# Patient Record
Sex: MALE | Race: WHITE | Employment: OTHER | URBAN - METROPOLITAN AREA
[De-identification: names, ages, dates, MRNs, and addresses within clinical notes are randomized per-mention and may not be internally consistent; named-entity substitution may affect disease eponyms.]

---

## 2019-11-27 ENCOUNTER — HOSPITAL ENCOUNTER (EMERGENCY)
Age: 79
Discharge: HOME | End: 2019-11-27
Attending: EMERGENCY MEDICINE
Payer: MEDICARE

## 2019-11-27 ENCOUNTER — APPOINTMENT (OUTPATIENT)
Dept: GENERAL RADIOLOGY | Age: 79
End: 2019-11-27
Payer: MEDICARE

## 2019-11-27 VITALS
DIASTOLIC BLOOD PRESSURE: 83 MMHG | WEIGHT: 170 LBS | SYSTOLIC BLOOD PRESSURE: 145 MMHG | OXYGEN SATURATION: 98 % | BODY MASS INDEX: 24.34 KG/M2 | HEIGHT: 70 IN | RESPIRATION RATE: 15 BRPM | TEMPERATURE: 97.7 F | HEART RATE: 70 BPM

## 2019-11-27 DIAGNOSIS — T14.8XXA ABRASION: ICD-10-CM

## 2019-11-27 DIAGNOSIS — S60.221A CONTUSION OF RIGHT HAND, INITIAL ENCOUNTER: Primary | ICD-10-CM

## 2019-11-27 PROCEDURE — 90715 TDAP VACCINE 7 YRS/> IM: CPT | Performed by: EMERGENCY MEDICINE

## 2019-11-27 PROCEDURE — 90471 IMMUNIZATION ADMIN: CPT | Performed by: EMERGENCY MEDICINE

## 2019-11-27 PROCEDURE — 73130 X-RAY EXAM OF HAND: CPT

## 2019-11-27 PROCEDURE — 99283 EMERGENCY DEPT VISIT LOW MDM: CPT

## 2019-11-27 PROCEDURE — 6360000002 HC RX W HCPCS: Performed by: EMERGENCY MEDICINE

## 2019-11-27 RX ADMIN — TETANUS TOXOID, REDUCED DIPHTHERIA TOXOID AND ACELLULAR PERTUSSIS VACCINE, ADSORBED 0.5 ML: 5; 2.5; 8; 8; 2.5 SUSPENSION INTRAMUSCULAR at 02:31

## 2019-11-27 ASSESSMENT — ENCOUNTER SYMPTOMS
ABDOMINAL PAIN: 0
BACK PAIN: 0
SHORTNESS OF BREATH: 0

## 2019-11-27 ASSESSMENT — PAIN DESCRIPTION - ORIENTATION: ORIENTATION: RIGHT

## 2019-11-27 ASSESSMENT — PAIN DESCRIPTION - DESCRIPTORS: DESCRIPTORS: ACHING

## 2019-11-27 ASSESSMENT — PAIN SCALES - GENERAL: PAINLEVEL_OUTOF10: 5

## 2019-11-27 ASSESSMENT — PAIN DESCRIPTION - PAIN TYPE: TYPE: ACUTE PAIN

## 2019-11-27 ASSESSMENT — PAIN DESCRIPTION - LOCATION: LOCATION: HAND

## 2019-11-27 ASSESSMENT — PAIN DESCRIPTION - PROGRESSION: CLINICAL_PROGRESSION: NOT CHANGED

## 2019-11-27 ASSESSMENT — PAIN DESCRIPTION - ONSET: ONSET: SUDDEN

## 2019-11-27 ASSESSMENT — PAIN DESCRIPTION - FREQUENCY: FREQUENCY: CONTINUOUS

## 2020-02-21 ENCOUNTER — HOSPITAL ENCOUNTER (OUTPATIENT)
Age: 80
Discharge: HOME | End: 2020-02-21
Payer: MEDICARE

## 2020-02-21 LAB
ANION GAP SERPL CALCULATED.3IONS-SCNC: 11 MMOL/L (ref 4–16)
APTT: 24.6 SECONDS (ref 25.1–37.1)
BASOPHILS ABSOLUTE: 0 K/CU MM
BASOPHILS RELATIVE PERCENT: 0.3 % (ref 0–1)
BUN BLDV-MCNC: 20 MG/DL (ref 6–23)
CALCIUM SERPL-MCNC: 9.1 MG/DL (ref 8.3–10.6)
CHLORIDE BLD-SCNC: 102 MMOL/L (ref 99–110)
CO2: 28 MMOL/L (ref 21–32)
CREAT SERPL-MCNC: 1.3 MG/DL (ref 0.9–1.3)
DIFFERENTIAL TYPE: ABNORMAL
EOSINOPHILS ABSOLUTE: 0.1 K/CU MM
EOSINOPHILS RELATIVE PERCENT: 1.6 % (ref 0–3)
GFR AFRICAN AMERICAN: >60 ML/MIN/1.73M2
GFR NON-AFRICAN AMERICAN: 54 ML/MIN/1.73M2
GLUCOSE BLD-MCNC: 89 MG/DL (ref 70–99)
HCT VFR BLD CALC: 46.9 % (ref 42–52)
HEMOGLOBIN: 14.8 GM/DL (ref 13.5–18)
IMMATURE NEUTROPHIL %: 0.4 % (ref 0–0.43)
INR BLD: 0.84 INDEX
LYMPHOCYTES ABSOLUTE: 1.3 K/CU MM
LYMPHOCYTES RELATIVE PERCENT: 16.9 % (ref 24–44)
MCH RBC QN AUTO: 30.5 PG (ref 27–31)
MCHC RBC AUTO-ENTMCNC: 31.6 % (ref 32–36)
MCV RBC AUTO: 96.5 FL (ref 78–100)
MONOCYTES ABSOLUTE: 0.7 K/CU MM
MONOCYTES RELATIVE PERCENT: 8.4 % (ref 0–4)
NUCLEATED RBC %: 0 %
PDW BLD-RTO: 11.9 % (ref 11.7–14.9)
PLATELET # BLD: 163 K/CU MM (ref 140–440)
PMV BLD AUTO: 10.3 FL (ref 7.5–11.1)
POTASSIUM SERPL-SCNC: 4.7 MMOL/L (ref 3.5–5.1)
PROTHROMBIN TIME: 10.1 SECONDS (ref 11.7–14.5)
RBC # BLD: 4.86 M/CU MM (ref 4.6–6.2)
SEGMENTED NEUTROPHILS ABSOLUTE COUNT: 5.6 K/CU MM
SEGMENTED NEUTROPHILS RELATIVE PERCENT: 72.4 % (ref 36–66)
SODIUM BLD-SCNC: 141 MMOL/L (ref 135–145)
TOTAL IMMATURE NEUTOROPHIL: 0.03 K/CU MM
TOTAL NUCLEATED RBC: 0 K/CU MM
WBC # BLD: 7.7 K/CU MM (ref 4–10.5)

## 2020-02-21 PROCEDURE — 80048 BASIC METABOLIC PNL TOTAL CA: CPT

## 2020-02-21 PROCEDURE — 36415 COLL VENOUS BLD VENIPUNCTURE: CPT

## 2020-02-21 PROCEDURE — 85025 COMPLETE CBC W/AUTO DIFF WBC: CPT

## 2020-02-21 PROCEDURE — 85730 THROMBOPLASTIN TIME PARTIAL: CPT

## 2020-02-21 PROCEDURE — 85610 PROTHROMBIN TIME: CPT

## 2020-02-26 ENCOUNTER — HOSPITAL ENCOUNTER (OUTPATIENT)
Dept: CARDIAC CATH/INVASIVE PROCEDURES | Age: 80
Setting detail: OUTPATIENT SURGERY
Discharge: HOME | End: 2020-02-26
Attending: INTERNAL MEDICINE | Admitting: INTERNAL MEDICINE
Payer: MEDICARE

## 2020-02-26 VITALS
WEIGHT: 169 LBS | RESPIRATION RATE: 18 BRPM | OXYGEN SATURATION: 96 % | HEART RATE: 82 BPM | SYSTOLIC BLOOD PRESSURE: 119 MMHG | TEMPERATURE: 98.1 F | HEIGHT: 70 IN | DIASTOLIC BLOOD PRESSURE: 72 MMHG | BODY MASS INDEX: 24.2 KG/M2

## 2020-02-26 PROCEDURE — C1894 INTRO/SHEATH, NON-LASER: HCPCS

## 2020-02-26 PROCEDURE — C1887 CATHETER, GUIDING: HCPCS

## 2020-02-26 PROCEDURE — 6360000002 HC RX W HCPCS

## 2020-02-26 PROCEDURE — 6370000000 HC RX 637 (ALT 250 FOR IP): Performed by: INTERNAL MEDICINE

## 2020-02-26 PROCEDURE — 6360000004 HC RX CONTRAST MEDICATION

## 2020-02-26 PROCEDURE — 2580000003 HC RX 258

## 2020-02-26 PROCEDURE — 93460 R&L HRT ART/VENTRICLE ANGIO: CPT

## 2020-02-26 PROCEDURE — 2709999900 HC NON-CHARGEABLE SUPPLY

## 2020-02-26 PROCEDURE — C1769 GUIDE WIRE: HCPCS

## 2020-02-26 RX ORDER — SODIUM CHLORIDE 0.9 % (FLUSH) 0.9 %
10 SYRINGE (ML) INJECTION EVERY 12 HOURS SCHEDULED
Status: CANCELLED | OUTPATIENT
Start: 2020-02-26

## 2020-02-26 RX ORDER — M-VIT,TX,IRON,MINS/CALC/FOLIC 27MG-0.4MG
1 TABLET ORAL DAILY
COMMUNITY
End: 2022-02-10 | Stop reason: CLARIF

## 2020-02-26 RX ORDER — MORPHINE SULFATE 2 MG/ML
1 INJECTION, SOLUTION INTRAMUSCULAR; INTRAVENOUS
Status: CANCELLED | OUTPATIENT
Start: 2020-02-26 | End: 2020-02-26

## 2020-02-26 RX ORDER — METOPROLOL SUCCINATE 25 MG/1
25 TABLET, EXTENDED RELEASE ORAL EVERY EVENING
Qty: 30 TABLET | Refills: 3 | Status: ON HOLD | OUTPATIENT
Start: 2020-02-26 | End: 2020-03-29 | Stop reason: HOSPADM

## 2020-02-26 RX ORDER — ACETAMINOPHEN 325 MG/1
650 TABLET ORAL EVERY 4 HOURS PRN
Status: CANCELLED | OUTPATIENT
Start: 2020-02-26

## 2020-02-26 RX ORDER — ATROPINE SULFATE 0.4 MG/ML
0.5 AMPUL (ML) INJECTION
Status: CANCELLED | OUTPATIENT
Start: 2020-02-26 | End: 2020-02-26

## 2020-02-26 RX ORDER — METOPROLOL SUCCINATE 25 MG/1
25 TABLET, EXTENDED RELEASE ORAL EVERY EVENING
Qty: 30 TABLET | Refills: 3 | OUTPATIENT
Start: 2020-02-26

## 2020-02-26 RX ORDER — SODIUM CHLORIDE 0.9 % (FLUSH) 0.9 %
10 SYRINGE (ML) INJECTION PRN
Status: CANCELLED | OUTPATIENT
Start: 2020-02-26

## 2020-02-26 RX ORDER — DIPHENHYDRAMINE HCL 25 MG
25 TABLET ORAL ONCE
Status: COMPLETED | OUTPATIENT
Start: 2020-02-26 | End: 2020-02-26

## 2020-02-26 RX ORDER — ASPIRIN 81 MG/1
81 TABLET ORAL DAILY
Qty: 90 TABLET | Refills: 1 | Status: SHIPPED | OUTPATIENT
Start: 2020-02-26 | End: 2020-12-30

## 2020-02-26 RX ORDER — SODIUM CHLORIDE 9 MG/ML
INJECTION, SOLUTION INTRAVENOUS CONTINUOUS
Status: CANCELLED | OUTPATIENT
Start: 2020-02-26

## 2020-02-26 RX ORDER — DIAZEPAM 5 MG/1
5 TABLET ORAL ONCE
Status: COMPLETED | OUTPATIENT
Start: 2020-02-26 | End: 2020-02-26

## 2020-02-26 RX ADMIN — DIAZEPAM 5 MG: 5 TABLET ORAL at 08:04

## 2020-02-26 RX ADMIN — DIPHENHYDRAMINE HYDROCHLORIDE 25 MG: 25 TABLET ORAL at 08:05

## 2020-02-26 NOTE — PROGRESS NOTES
Discharge instructions reviewed. Questions answered. Belongings gathered and checked with admission list. PIV removed. Site WNL. Patient discharged to home with friend.

## 2020-02-26 NOTE — H&P
clubbing noted. NEUROLOGIC:  Cranial nerves II through XII are grossly intact. Echo shows LV function was preserved. Mild aortic stenosis noted. His  stress test shows inferior apical ischemia present.  ______. LABORATORY DATA:  BUN is 20, creatinine 1.3, CBC normal.    IMPRESSION:  This is a 62-year-old male patient with a history of  coronary artery disease status post angioplasty done in 2007. Recent  stress test shows ischemia present. He has been having symptoms of  chest pain and shortness of breath present. In light of all this, we  will plan for heart cath today. We will plan for right and left heart  catheterization. We will make further recommendations based on that.         Laquita Caicedo MD    D: 02/26/2020 8:04:42       T: 02/26/2020 8:48:08     [id number]  Job#: [id number]     Doc#: [id number]    CC:

## 2020-02-26 NOTE — PROCEDURES
angiogram revealed the right coronary artery is a medium-sized  vessel, dominant vessel; and right coronary artery has mild disease  noted. PD and PL branches have mild disease noted. IMPRESSION:  1. Left main is patent. 2.  Circ has mild disease noted in OM1 and OM2 mild disease noted. 3.  LAD has a proximal stent and mid stent present, which is widely  patent. 4.  RCA has mild disease noted, is a dominant vessel. 5.  EDP was normal.  6.  Right heart pressure was normal.    The patient has patent stents in the LAD present; otherwise, mild  disease in the circ and RCA present. Normal right heart pressure  present. At this time, we will continue medical treatment. We will switch his  verapamil to 240 mg in the morning and Toprol 25 mg at bedtime. As he  was off the Toprol, he was having tachycardia. At this time, so I will leave him on the verapamil 240 mg in the morning  and Toprol 25 mg at bedtime and Lipitor and aspirin; and we will have  him follow up in the office and we will consider doing a Holter monitor  and evaluate the AFib and if he is having persistent AFib, then we will  need to reevaluate that.       Blood loss Walter Cifuentes MD    D: 02/26/2020 10:16:32       T: 02/26/2020 10:59:05     [id number]  Job#: [id number]     Doc#: [id number]    CC:

## 2020-03-27 ENCOUNTER — APPOINTMENT (OUTPATIENT)
Dept: CT IMAGING | Age: 80
DRG: 310 | End: 2020-03-27
Payer: MEDICARE

## 2020-03-27 ENCOUNTER — HOSPITAL ENCOUNTER (INPATIENT)
Age: 80
LOS: 2 days | Discharge: HOME | DRG: 310 | End: 2020-03-29
Attending: EMERGENCY MEDICINE | Admitting: INTERNAL MEDICINE
Payer: MEDICARE

## 2020-03-27 ENCOUNTER — APPOINTMENT (OUTPATIENT)
Dept: GENERAL RADIOLOGY | Age: 80
DRG: 310 | End: 2020-03-27
Payer: MEDICARE

## 2020-03-27 ENCOUNTER — APPOINTMENT (OUTPATIENT)
Dept: ULTRASOUND IMAGING | Age: 80
DRG: 310 | End: 2020-03-27
Payer: MEDICARE

## 2020-03-27 PROBLEM — I48.91 A-FIB (HCC): Status: ACTIVE | Noted: 2020-03-27

## 2020-03-27 LAB
ALBUMIN SERPL-MCNC: 3.5 GM/DL (ref 3.4–5)
ALP BLD-CCNC: 76 IU/L (ref 40–129)
ALT SERPL-CCNC: 18 U/L (ref 10–40)
ANION GAP SERPL CALCULATED.3IONS-SCNC: 10 MMOL/L (ref 4–16)
APTT: 24.9 SECONDS (ref 25.1–37.1)
AST SERPL-CCNC: 21 IU/L (ref 15–37)
BASOPHILS ABSOLUTE: 0 K/CU MM
BASOPHILS RELATIVE PERCENT: 0.7 % (ref 0–1)
BILIRUB SERPL-MCNC: 0.7 MG/DL (ref 0–1)
BUN BLDV-MCNC: 21 MG/DL (ref 6–23)
CALCIUM SERPL-MCNC: 8.7 MG/DL (ref 8.3–10.6)
CHLORIDE BLD-SCNC: 106 MMOL/L (ref 99–110)
CO2: 22 MMOL/L (ref 21–32)
CREAT SERPL-MCNC: 1.2 MG/DL (ref 0.9–1.3)
D DIMER: 696 NG/ML(DDU)
DIFFERENTIAL TYPE: ABNORMAL
EKG ATRIAL RATE: 153 BPM
EKG ATRIAL RATE: 74 BPM
EKG DIAGNOSIS: NORMAL
EKG DIAGNOSIS: NORMAL
EKG P AXIS: 3 DEGREES
EKG P-R INTERVAL: 152 MS
EKG Q-T INTERVAL: 310 MS
EKG Q-T INTERVAL: 396 MS
EKG QRS DURATION: 86 MS
EKG QRS DURATION: 86 MS
EKG QTC CALCULATION (BAZETT): 439 MS
EKG QTC CALCULATION (BAZETT): 459 MS
EKG R AXIS: -24 DEGREES
EKG R AXIS: -25 DEGREES
EKG T AXIS: -38 DEGREES
EKG T AXIS: 154 DEGREES
EKG VENTRICULAR RATE: 132 BPM
EKG VENTRICULAR RATE: 74 BPM
EOSINOPHILS ABSOLUTE: 0.2 K/CU MM
EOSINOPHILS RELATIVE PERCENT: 2.9 % (ref 0–3)
GFR AFRICAN AMERICAN: >60 ML/MIN/1.73M2
GFR NON-AFRICAN AMERICAN: 59 ML/MIN/1.73M2
GLUCOSE BLD-MCNC: 102 MG/DL (ref 70–99)
HCT VFR BLD CALC: 43.5 % (ref 42–52)
HEMOGLOBIN: 14.3 GM/DL (ref 13.5–18)
IMMATURE NEUTROPHIL %: 0.2 % (ref 0–0.43)
INR BLD: 0.91 INDEX
LV EF: 53 %
LVEF MODALITY: NORMAL
LYMPHOCYTES ABSOLUTE: 1.8 K/CU MM
LYMPHOCYTES RELATIVE PERCENT: 31 % (ref 24–44)
MAGNESIUM: 1.9 MG/DL (ref 1.8–2.4)
MCH RBC QN AUTO: 31 PG (ref 27–31)
MCHC RBC AUTO-ENTMCNC: 32.9 % (ref 32–36)
MCV RBC AUTO: 94.4 FL (ref 78–100)
MONOCYTES ABSOLUTE: 0.7 K/CU MM
MONOCYTES RELATIVE PERCENT: 11.2 % (ref 0–4)
NUCLEATED RBC %: 0 %
PDW BLD-RTO: 11.9 % (ref 11.7–14.9)
PLATELET # BLD: 121 K/CU MM (ref 140–440)
PMV BLD AUTO: 10.3 FL (ref 7.5–11.1)
POTASSIUM SERPL-SCNC: 4 MMOL/L (ref 3.5–5.1)
PRO-BNP: 254.7 PG/ML
PROTHROMBIN TIME: 11 SECONDS (ref 11.7–14.5)
RBC # BLD: 4.61 M/CU MM (ref 4.6–6.2)
SEGMENTED NEUTROPHILS ABSOLUTE COUNT: 3.1 K/CU MM
SEGMENTED NEUTROPHILS RELATIVE PERCENT: 54 % (ref 36–66)
SODIUM BLD-SCNC: 138 MMOL/L (ref 135–145)
T4 FREE: 1.28 NG/DL (ref 0.9–1.8)
TOTAL IMMATURE NEUTOROPHIL: 0.01 K/CU MM
TOTAL NUCLEATED RBC: 0 K/CU MM
TOTAL PROTEIN: 6 GM/DL (ref 6.4–8.2)
TROPONIN T: <0.01 NG/ML
TROPONIN T: <0.01 NG/ML
TSH HIGH SENSITIVITY: 1.61 UIU/ML (ref 0.27–4.2)
WBC # BLD: 5.8 K/CU MM (ref 4–10.5)

## 2020-03-27 PROCEDURE — 83880 ASSAY OF NATRIURETIC PEPTIDE: CPT

## 2020-03-27 PROCEDURE — 96361 HYDRATE IV INFUSION ADD-ON: CPT

## 2020-03-27 PROCEDURE — 92960 CARDIOVERSION ELECTRIC EXT: CPT

## 2020-03-27 PROCEDURE — 85610 PROTHROMBIN TIME: CPT

## 2020-03-27 PROCEDURE — 7100000001 HC PACU RECOVERY - ADDTL 15 MIN

## 2020-03-27 PROCEDURE — 6360000004 HC RX CONTRAST MEDICATION: Performed by: EMERGENCY MEDICINE

## 2020-03-27 PROCEDURE — 85025 COMPLETE CBC W/AUTO DIFF WBC: CPT

## 2020-03-27 PROCEDURE — B246ZZ4 ULTRASONOGRAPHY OF RIGHT AND LEFT HEART, TRANSESOPHAGEAL: ICD-10-PCS | Performed by: INTERNAL MEDICINE

## 2020-03-27 PROCEDURE — 99285 EMERGENCY DEPT VISIT HI MDM: CPT

## 2020-03-27 PROCEDURE — 85730 THROMBOPLASTIN TIME PARTIAL: CPT

## 2020-03-27 PROCEDURE — 6370000000 HC RX 637 (ALT 250 FOR IP): Performed by: INTERNAL MEDICINE

## 2020-03-27 PROCEDURE — 7100000000 HC PACU RECOVERY - FIRST 15 MIN

## 2020-03-27 PROCEDURE — 80053 COMPREHEN METABOLIC PANEL: CPT

## 2020-03-27 PROCEDURE — 6360000002 HC RX W HCPCS: Performed by: INTERNAL MEDICINE

## 2020-03-27 PROCEDURE — 93005 ELECTROCARDIOGRAM TRACING: CPT | Performed by: INTERNAL MEDICINE

## 2020-03-27 PROCEDURE — 2580000003 HC RX 258: Performed by: INTERNAL MEDICINE

## 2020-03-27 PROCEDURE — 93312 ECHO TRANSESOPHAGEAL: CPT

## 2020-03-27 PROCEDURE — 84439 ASSAY OF FREE THYROXINE: CPT

## 2020-03-27 PROCEDURE — 84443 ASSAY THYROID STIM HORMONE: CPT

## 2020-03-27 PROCEDURE — 93010 ELECTROCARDIOGRAM REPORT: CPT | Performed by: INTERNAL MEDICINE

## 2020-03-27 PROCEDURE — 70450 CT HEAD/BRAIN W/O DYE: CPT

## 2020-03-27 PROCEDURE — 71045 X-RAY EXAM CHEST 1 VIEW: CPT

## 2020-03-27 PROCEDURE — 94761 N-INVAS EAR/PLS OXIMETRY MLT: CPT

## 2020-03-27 PROCEDURE — 84484 ASSAY OF TROPONIN QUANT: CPT

## 2020-03-27 PROCEDURE — 5A2204Z RESTORATION OF CARDIAC RHYTHM, SINGLE: ICD-10-PCS | Performed by: INTERNAL MEDICINE

## 2020-03-27 PROCEDURE — 2700000000 HC OXYGEN THERAPY PER DAY

## 2020-03-27 PROCEDURE — 96374 THER/PROPH/DIAG INJ IV PUSH: CPT

## 2020-03-27 PROCEDURE — 2500000003 HC RX 250 WO HCPCS: Performed by: PHYSICIAN ASSISTANT

## 2020-03-27 PROCEDURE — 93005 ELECTROCARDIOGRAM TRACING: CPT | Performed by: PHYSICIAN ASSISTANT

## 2020-03-27 PROCEDURE — 2140000000 HC CCU INTERMEDIATE R&B

## 2020-03-27 PROCEDURE — 93306 TTE W/DOPPLER COMPLETE: CPT

## 2020-03-27 PROCEDURE — 71275 CT ANGIOGRAPHY CHEST: CPT

## 2020-03-27 PROCEDURE — 2580000003 HC RX 258: Performed by: PHYSICIAN ASSISTANT

## 2020-03-27 PROCEDURE — 85379 FIBRIN DEGRADATION QUANT: CPT

## 2020-03-27 PROCEDURE — 83735 ASSAY OF MAGNESIUM: CPT

## 2020-03-27 RX ORDER — ACETAMINOPHEN 325 MG/1
650 TABLET ORAL EVERY 6 HOURS PRN
Status: DISCONTINUED | OUTPATIENT
Start: 2020-03-27 | End: 2020-03-29 | Stop reason: HOSPADM

## 2020-03-27 RX ORDER — SODIUM CHLORIDE 0.9 % (FLUSH) 0.9 %
10 SYRINGE (ML) INJECTION PRN
Status: DISCONTINUED | OUTPATIENT
Start: 2020-03-27 | End: 2020-03-29 | Stop reason: HOSPADM

## 2020-03-27 RX ORDER — ATORVASTATIN CALCIUM 40 MG/1
40 TABLET, FILM COATED ORAL DAILY
Status: DISCONTINUED | OUTPATIENT
Start: 2020-03-27 | End: 2020-03-29 | Stop reason: HOSPADM

## 2020-03-27 RX ORDER — DILTIAZEM HYDROCHLORIDE 5 MG/ML
10 INJECTION INTRAVENOUS ONCE
Status: COMPLETED | OUTPATIENT
Start: 2020-03-27 | End: 2020-03-27

## 2020-03-27 RX ORDER — PROMETHAZINE HYDROCHLORIDE 25 MG/1
12.5 TABLET ORAL EVERY 6 HOURS PRN
Status: DISCONTINUED | OUTPATIENT
Start: 2020-03-27 | End: 2020-03-29 | Stop reason: HOSPADM

## 2020-03-27 RX ORDER — METOPROLOL SUCCINATE 25 MG/1
25 TABLET, EXTENDED RELEASE ORAL EVERY EVENING
Status: DISCONTINUED | OUTPATIENT
Start: 2020-03-27 | End: 2020-03-27

## 2020-03-27 RX ORDER — 0.9 % SODIUM CHLORIDE 0.9 %
1000 INTRAVENOUS SOLUTION INTRAVENOUS ONCE
Status: COMPLETED | OUTPATIENT
Start: 2020-03-27 | End: 2020-03-27

## 2020-03-27 RX ORDER — ACETAMINOPHEN 650 MG/1
650 SUPPOSITORY RECTAL EVERY 6 HOURS PRN
Status: DISCONTINUED | OUTPATIENT
Start: 2020-03-27 | End: 2020-03-29 | Stop reason: HOSPADM

## 2020-03-27 RX ORDER — ACETAMINOPHEN 650 MG/1
650 SUPPOSITORY RECTAL EVERY 6 HOURS PRN
Status: DISCONTINUED | OUTPATIENT
Start: 2020-03-27 | End: 2020-03-27 | Stop reason: SDUPTHER

## 2020-03-27 RX ORDER — METOPROLOL SUCCINATE 50 MG/1
50 TABLET, EXTENDED RELEASE ORAL 2 TIMES DAILY
Status: DISCONTINUED | OUTPATIENT
Start: 2020-03-27 | End: 2020-03-29 | Stop reason: HOSPADM

## 2020-03-27 RX ORDER — SODIUM CHLORIDE 0.9 % (FLUSH) 0.9 %
10 SYRINGE (ML) INJECTION EVERY 12 HOURS SCHEDULED
Status: DISCONTINUED | OUTPATIENT
Start: 2020-03-27 | End: 2020-03-29 | Stop reason: HOSPADM

## 2020-03-27 RX ORDER — ONDANSETRON 2 MG/ML
4 INJECTION INTRAMUSCULAR; INTRAVENOUS EVERY 6 HOURS PRN
Status: DISCONTINUED | OUTPATIENT
Start: 2020-03-27 | End: 2020-03-29 | Stop reason: HOSPADM

## 2020-03-27 RX ORDER — SODIUM CHLORIDE 0.9 % (FLUSH) 0.9 %
10 SYRINGE (ML) INJECTION EVERY 12 HOURS SCHEDULED
Status: DISCONTINUED | OUTPATIENT
Start: 2020-03-27 | End: 2020-03-27 | Stop reason: SDUPTHER

## 2020-03-27 RX ORDER — POLYETHYLENE GLYCOL 3350 17 G/17G
17 POWDER, FOR SOLUTION ORAL DAILY PRN
Status: DISCONTINUED | OUTPATIENT
Start: 2020-03-27 | End: 2020-03-29 | Stop reason: HOSPADM

## 2020-03-27 RX ORDER — M-VIT,TX,IRON,MINS/CALC/FOLIC 27MG-0.4MG
1 TABLET ORAL DAILY
Status: DISCONTINUED | OUTPATIENT
Start: 2020-03-27 | End: 2020-03-29 | Stop reason: HOSPADM

## 2020-03-27 RX ORDER — ASPIRIN 81 MG/1
81 TABLET ORAL DAILY
Status: DISCONTINUED | OUTPATIENT
Start: 2020-03-27 | End: 2020-03-29 | Stop reason: HOSPADM

## 2020-03-27 RX ORDER — SODIUM CHLORIDE 0.9 % (FLUSH) 0.9 %
10 SYRINGE (ML) INJECTION PRN
Status: DISCONTINUED | OUTPATIENT
Start: 2020-03-27 | End: 2020-03-27 | Stop reason: SDUPTHER

## 2020-03-27 RX ORDER — SODIUM CHLORIDE 9 MG/ML
INJECTION, SOLUTION INTRAVENOUS CONTINUOUS
Status: DISCONTINUED | OUTPATIENT
Start: 2020-03-27 | End: 2020-03-28

## 2020-03-27 RX ORDER — ACETAMINOPHEN 325 MG/1
650 TABLET ORAL EVERY 6 HOURS PRN
Status: DISCONTINUED | OUTPATIENT
Start: 2020-03-27 | End: 2020-03-27 | Stop reason: SDUPTHER

## 2020-03-27 RX ADMIN — SODIUM CHLORIDE: 9 INJECTION, SOLUTION INTRAVENOUS at 08:00

## 2020-03-27 RX ADMIN — ASPIRIN 81 MG: 81 TABLET, COATED ORAL at 12:31

## 2020-03-27 RX ADMIN — MULTIPLE VITAMINS W/ MINERALS TAB 1 TABLET: TAB at 12:33

## 2020-03-27 RX ADMIN — METOPROLOL SUCCINATE 50 MG: 50 TABLET, EXTENDED RELEASE ORAL at 20:21

## 2020-03-27 RX ADMIN — APIXABAN 5 MG: 5 TABLET, FILM COATED ORAL at 12:31

## 2020-03-27 RX ADMIN — AMIODARONE HYDROCHLORIDE 0.5 MG/MIN: 50 INJECTION, SOLUTION INTRAVENOUS at 19:18

## 2020-03-27 RX ADMIN — SODIUM CHLORIDE: 9 INJECTION, SOLUTION INTRAVENOUS at 17:55

## 2020-03-27 RX ADMIN — ATORVASTATIN CALCIUM 40 MG: 40 TABLET, FILM COATED ORAL at 12:31

## 2020-03-27 RX ADMIN — AMIODARONE HYDROCHLORIDE 1 MG/MIN: 50 INJECTION, SOLUTION INTRAVENOUS at 12:38

## 2020-03-27 RX ADMIN — SODIUM CHLORIDE 1000 ML: 9 INJECTION, SOLUTION INTRAVENOUS at 06:13

## 2020-03-27 RX ADMIN — DEXTROSE MONOHYDRATE 5 MG/HR: 50 INJECTION, SOLUTION INTRAVENOUS at 07:17

## 2020-03-27 RX ADMIN — ENOXAPARIN SODIUM 80 MG: 100 INJECTION SUBCUTANEOUS at 10:57

## 2020-03-27 RX ADMIN — SODIUM CHLORIDE, PRESERVATIVE FREE 10 ML: 5 INJECTION INTRAVENOUS at 20:21

## 2020-03-27 RX ADMIN — IOPAMIDOL 80 ML: 755 INJECTION, SOLUTION INTRAVENOUS at 07:58

## 2020-03-27 RX ADMIN — APIXABAN 5 MG: 5 TABLET, FILM COATED ORAL at 20:21

## 2020-03-27 RX ADMIN — AMIODARONE HYDROCHLORIDE 0.5 MG/MIN: 50 INJECTION, SOLUTION INTRAVENOUS at 23:11

## 2020-03-27 RX ADMIN — DEXTROSE MONOHYDRATE 150 MG: 50 INJECTION, SOLUTION INTRAVENOUS at 12:37

## 2020-03-27 RX ADMIN — DILTIAZEM HYDROCHLORIDE 10 MG: 5 INJECTION, SOLUTION INTRAVENOUS at 06:13

## 2020-03-27 RX ADMIN — METOPROLOL SUCCINATE 50 MG: 50 TABLET, EXTENDED RELEASE ORAL at 12:31

## 2020-03-27 ASSESSMENT — PAIN SCALES - GENERAL
PAINLEVEL_OUTOF10: 0

## 2020-03-27 NOTE — PROCEDURES
was  successfully cardioverted from atrial fibrillation to sinus rhythm with  one shock of 200 joules. IMPRESSION:  1. Successful cardioversion. 2.  Keep him on amiodarone and change him to Toprol 50 mg b.i.d. to rate  control and start anticoagulation and we will make further  recommendations based on the hospital course.         Seb Joyce MD    D: 03/27/2020 11:13:54       T: 03/27/2020 12:16:05     [id number]  Job#: [id number]     Doc#: [id number]    CC:

## 2020-03-27 NOTE — H&P
History and Physical      Name:  Darek Purvis /Age/Sex: 1940  (70 y.o. male)   MRN & CSN:  [id number] & [id number] Admission Date/Time: 3/27/2020  5:46 AM   Location:  [facility] PCP: Kylee Garcia MD       Hospital Day: 1    Assessment and Plan:   Darek Purvis is a 70 y.o.  male  who presents with:    Paroxysmal atrial fibrillation with rapid ventricular response  -Per cardiology the patient has a probable history of prior afib  -Anticoagulation: He was on baby aspirin 81 mg at home for coronary artery disease, cardiology has started Eliquis  -Rate control: He is on Toprol-XL 25 mg daily as well as verapamil 240 mg daily at home - cardiology to manage these  -possible MICHAEL with cardioversion today    Coronary artery disease status post stent in   -Resume aspirin 81 mg daily    HTN - on metoprolol and verapamil at home - verapamil was held on admission since Cardizem drip was running    HLD - Lipitor as at home, resume    Never smoker    Remote history of basal cell carcinoma left cheek that is post excision    Mild thrombocytopenia - platelet count 638 - monitor    PCP: Dr. Yumi Akers    History of Present Illness:     Chief Complaint:   Darek Purvis is a 70 y.o.  male  who presents with syncope    Debora Carbajal is a pleasant 42-year-old who stated that he got up to go to the bathroom at 0430 this morning. When he went to the bathroom he got dizzy. He then urinated. He made it back to his bedroom and he said \"I just passed out. I fell flat. I skinned both of my arms\". In the ED he was found to have atrial fibrillation with RVR and was given Cardizem 10 mg IV at 6:15 AM.  This resulted in some improvement in his rapid heart rate, however by 7:15 AM he needed to be started on a Cardizem drip. He normally takes metoprolol and verapamil at home. He lives with his wife. Pain level was 0 out of 10 when I saw him.   Surrogate decision maker: His wife Noman East he states  CODE STATUS: He states he wants to be a full code    Living will: He states he has one, and states he has not looked at it for a while. I asked him if he develops something serious and untreatable if he would want to be on life support, he responded \"no\". Social history: He states he does not smoke. He states he drinks very little alcohol. Results of recent cardiac cath done February 26, 2020 by Dr. Miryam Perez:    1. Left main is patent. 2.  Circ has mild disease noted in OM1 and OM2 mild disease noted. 3.  LAD has a proximal stent and mid stent present, which is widely  patent. 4.  RCA has mild disease noted, is a dominant vessel. 5.  EDP was normal.  6.  Right heart pressure was normal.     The patient has patent stents in the LAD present; otherwise, mild  disease in the circ and RCA present. Normal right heart pressure  present. At this time, we will continue medical treatment. We will switch his  verapamil to 240 mg in the morning and Toprol 25 mg at bedtime. As he  was off the Toprol, he was having tachycardia. At this time, so I will leave him on the verapamil 240 mg in the morning  and Toprol 25 mg at bedtime and Lipitor and aspirin; and we will have  him follow up in the office and we will consider doing a Holter monitor  and evaluate the AFib and if he is having persistent AFib, then we will  need to reevaluate that. Blood loss Stacey Quiñonez MD     D: 02/26/2020 10:16:32     10-14 point ROS reviewed negative, unless as noted above: No vomiting or bleeding noted    Objective:   No intake or output data in the 24 hours ending 03/27/20 0716   Vitals:   Vitals:    03/27/20 0642   BP: 116/79   Pulse: 104   Resp: 16   Temp:    SpO2: 99%     Physical Exam:    GEN Awake male, sitting upright in bed. . Body mass index is 23.82 kg/m². No dyspnea at rest.  He is well-developed and well-nourished  EYES  No scleral erythema, discharge, or conjunctivitis.   Extraocular muscles are intact  HENT Mucous membranes are moist.   NECK No apparent thyromegaly or masses. RESP Clear to auscultation, no wheezes, rales or rhonchi. Symmetric chest movement while on room air. CARDIO/VASC S1/S2 auscultated. Regular rate without appreciable murmurs, rubs, or gallops. GI Abdomen is soft without significant tenderness   Dexter catheter is not present. MSK No gross joint deformities. Spontaneous movement of all extremities  SKIN Normal coloration, warm, dry. NEURO Cranial nerves appear grossly intact, normal speech, no lateralizing weakness. PSYCH Awake, alert, oriented x 4. Affect appropriate. Past Medical History: PMHx:   Past Medical History:   Diagnosis Date    Arthritis     CAD (coronary artery disease)     Hyperlipidemia     Hypertension      PSHx:  has a past surgical history that includes Percutaneous Transluminal Coronary Angio; hernia repair; cyst removal; and Colonoscopy (2/4/13). Allergies: No Known Allergies  Home Medications:   Prior to Admission medications    Medication Sig Start Date End Date Taking?  Authorizing Provider   Multiple Vitamins-Minerals (THERAPEUTIC MULTIVITAMIN-MINERALS) tablet Take 1 tablet by mouth daily    Historical Provider, MD   metoprolol succinate (TOPROL XL) 25 MG extended release tablet Take 1 tablet by mouth every evening 2/26/20   Wilbert Chow MD   aspirin EC 81 MG EC tablet Take 1 tablet by mouth daily 2/26/20   Wilbert Chow MD   verapamil (CALAN) 120 MG tablet Take 240 mg by mouth daily     Historical Provider, MD   Atorvastatin Calcium (LIPITOR PO) Take 40 mg by mouth daily     Historical Provider, MD     FHx: there is heart disease on his mother's side  SHx:   Social History     Socioeconomic History    Marital status:      Spouse name: None    Number of children: None    Years of education: None    Highest education level: None   Occupational History    None   Social Needs    Financial resource strain: None    Food insecurity     Worry: None     Inability: None  Transportation needs     Medical: None     Non-medical: None   Tobacco Use    Smoking status: Never Smoker    Smokeless tobacco: Never Used   Substance and Sexual Activity    Alcohol use: Yes     Comment: occas    Drug use: No    Sexual activity: None   Lifestyle    Physical activity     Days per week: None     Minutes per session: None    Stress: None   Relationships    Social connections     Talks on phone: None     Gets together: None     Attends Christian service: None     Active member of club or organization: None     Attends meetings of clubs or organizations: None     Relationship status: None    Intimate partner violence     Fear of current or ex partner: None     Emotionally abused: None     Physically abused: None     Forced sexual activity: None   Other Topics Concern    None   Social History Narrative    None     I spoke with the ED provider to be decided to admit him    Hemoglobin is 12.5    Electronically signed by Aryan Stahl MD on 3/27/2020 at 7:16 AM

## 2020-03-27 NOTE — CONSULTS
[address] [place] [address], [address] [facility]                                  CONSULTATION    PATIENT NAME: Kitty Burrell                     :        1940  MED REC NO:   [id number]                          ROOM:       3103  ACCOUNT NO:   [de-identified]                           ADMIT DATE: 2020  PROVIDER:     Venessa Smith MD    CONSULT DATE:  2020    INDICATION:  Syncope. HISTORY OF PRESENT ILLNESS:  This is a 51-year-old male. He said that  he got up this morning and he was going to bathroom. He got lightheaded  and short of breath and he fell. Then, he called the squad who brought  him to the hospital.  He said he has been feeling palpitations on and  off last week, and he has been dizzy on and off also, but no prior  history of syncopal episode present. The patient did have a heart catheterization done recently, found to  have left main was patent, LAD stents were patent. Circ had mild  disease. RCA had mild disease. EDP was normal.  Right heart pressures  were within normal range also. PAST MEDICAL HISTORY:  The patient has a history of having probably  paroxysmal atrial fibrillation, but he has been rate controlled so far. History of hypertension, recent heart cath was negative, hyperlipidemia,  coronary artery disease, status post angioplasty done in , and  hemorrhoid surgery. PAST SURGICAL HISTORY:  Baker's cyst on the right knee, hernia repair,  and angioplasty. SOCIAL HISTORY:  He does not smoke, does not drink. ALLERGIES:  NKDA. MEDICATIONS:  He is on verapamil in the morning 240 mg, Toprol 25 mg at  bedtime, aspirin, and Lipitor. PHYSICAL EXAMINATION:  GENERAL:  The patient is awake and alert, answering questions, not in  acute distress. VITAL SIGNS:  Temperature is afebrile, pulse is in 110s, atrial  fibrillation, and blood pressure 160/79.   HEENT:  Head is normocephalic and

## 2020-03-27 NOTE — ED PROVIDER NOTES
mouth every evening 30 tablet 3    aspirin EC 81 MG EC tablet Take 1 tablet by mouth daily 90 tablet 1    verapamil (CALAN) 120 MG tablet Take 240 mg by mouth daily       Atorvastatin Calcium (LIPITOR PO) Take 40 mg by mouth daily        No Known Allergies    Nursing Notes Reviewed    Physical Exam:  Triage VS:    ED Triage Vitals [03/27/20 0552]   Enc Vitals Group      /76      Pulse 132      Resp 15      Temp 97.7 °F (36.5 °C)      Temp Source Oral      SpO2 98 %      Weight       Height       Head Circumference       Peak Flow       Pain Score       Pain Loc       Pain Edu? Excl. in [address]? My pulse ox interpretation is - normal    General appearance:  No acute distress. Skin:  Warm. Dry. Eye:  Extraocular movements intact. Ears, nose, mouth and throat:  Oral mucosa moist   Neck:  Trachea midline. Extremity:  No swelling. Normal ROM     Heart:  Tachycardic with irregular rhythm, rate 130s-140s, normal S1 & S2, no extra heart sounds. Perfusion:  intact  Respiratory:  Lungs clear to auscultation bilaterally. Respirations nonlabored. Abdominal:   Soft. Nontender. Non distended. Neurological:  Alert and oriented, follows commands with all limbs, strength appears equal in extremities, sensation intact, CN 2-12 grossly intact, no dizziness at this time so HINTS not performed, gait deferred.            Psychiatric:  Appropriate    I have reviewed and interpreted all of the currently available lab results from this visit (if applicable):  Results for orders placed or performed during the hospital encounter of 03/27/20   CBC w/ auto diff   Result Value Ref Range    WBC 5.8 4.0 - 10.5 K/CU MM    RBC 4.61 4.6 - 6.2 M/CU MM    Hemoglobin 14.3 13.5 - 18.0 GM/DL    Hematocrit 43.5 42 - 52 %    MCV 94.4 78 - 100 FL    MCH 31.0 27 - 31 PG    MCHC 32.9 32.0 - 36.0 %    RDW 11.9 11.7 - 14.9 %    Platelets 349 (L) 143 - 440 K/CU MM    MPV 10.3 7.5 - 11.1 FL    Differential Type AUTOMATED DIFFERENTIAL     Segs Relative 54.0 36 - 66 %    Lymphocytes % 31.0 24 - 44 %    Monocytes % 11.2 (H) 0 - 4 %    Eosinophils % 2.9 0 - 3 %    Basophils % 0.7 0 - 1 %    Segs Absolute 3.1 K/CU MM    Lymphocytes Absolute 1.8 K/CU MM    Monocytes Absolute 0.7 K/CU MM    Eosinophils Absolute 0.2 K/CU MM    Basophils Absolute 0.0 K/CU MM    Nucleated RBC % 0.0 %    Total Nucleated RBC 0.0 K/CU MM    Total Immature Neutrophil 0.01 K/CU MM    Immature Neutrophil % 0.2 0 - 0.43 %   CMP   Result Value Ref Range    Sodium 138 135 - 145 MMOL/L    Potassium 4.0 3.5 - 5.1 MMOL/L    Chloride 106 99 - 110 mMol/L    CO2 22 21 - 32 MMOL/L    BUN 21 6 - 23 MG/DL    CREATININE 1.2 0.9 - 1.3 MG/DL    Glucose 102 (H) 70 - 99 MG/DL    Calcium 8.7 8.3 - 10.6 MG/DL    Alb 3.5 3.4 - 5.0 GM/DL    Total Protein 6.0 (L) 6.4 - 8.2 GM/DL    Total Bilirubin 0.7 0.0 - 1.0 MG/DL    ALT 18 10 - 40 U/L    AST 21 15 - 37 IU/L    Alkaline Phosphatase 76 40 - 129 IU/L    GFR Non- 59 (L) >60 mL/min/1.73m2    GFR African American >60 >60 mL/min/1.73m2    Anion Gap 10 4 - 16   Troponin   Result Value Ref Range    Troponin T <0.010 <0.01 NG/ML   TSH without Reflex   Result Value Ref Range    TSH, High Sensitivity 1.610 0.270 - 4.20 uIu/ml   T4, free   Result Value Ref Range    T4 Free 1.28 0.9 - 1.8 NG/DL   Brain Natriuretic Peptide   Result Value Ref Range    Pro-.7 <300 PG/ML   Magnesium   Result Value Ref Range    Magnesium 1.9 1.8 - 2.4 mg/dl   Protime/INR & PTT   Result Value Ref Range    Protime 11.0 (L) 11.7 - 14.5 SECONDS    INR 0.91 INDEX      Radiographs (if obtained):  Radiologist's Report Reviewed:  No results found. EKG (if obtained): (All EKG's are interpreted by myself in the absence of a cardiologist)  Atrial fibrillation with average ventricular response, rate of 132 bpm.  No ST elevation. No previous to compare. MDM:  61-year-old male presents with palpitations, shortness of breath, syncope.   Found to be in atrial fibrillation with rapid ventricular spines, rate up to the 150s to 160s on initial arrival, blood pressure stable, he is awake and talking to us. Cardizem was ordered. Reviewed chart and previous catheterization, had 2 patent stents, Plavix had been stopped in February. Appears that his metoprolol had been stopped at that time as he had been having lightheadedness episodes but then had been restarted in the evening, he is not sure if he is on metoprolol or not, is on verapamil. He is on aspirin, no other blood thinners. Holding off on heparin given the syncope and fall, we will get a head CT but I did start Cardizem. Bolus was given and heart rate improved significantly, blood pressure was borderline, systolic 95, he was awake and talking to us, we held the drip briefly, receiving fluids and blood pressure has improved after that initial bolus. Troponin is normal, hemoglobin is stable, electrolytes are stable. Consulted cardiology. Plan will be for admission    Spoke with Dr. Cleo Zaragoza at [phone]- he will see patient today, get MICHAEL and see if needs to cardiovert today, agreed with holding off on heparin until head CT and syncope workup done. Spoke with Dr. Gaby Baltazar at [phone] for admission to hospitalist team. Pending CT head at this time, HR controlled in 100s-110s, BP improved after that initial bolus of cardizem and held drip for now, drip at bedside if needed. Patient agreeable to plan for admission. Total critical care time today provided was 38 minutes. This excludes seperately billable procedure. Critical care time provided for afib with RVR that required close evaluation and/or intervention with concern for patient decompensation. Clinical Impression:  1. Atrial fibrillation with rapid ventricular response ([address]    2.  Syncope and collapse      Disposition referral (if applicable):  MD [address] [id number] [phone]          Disposition medications (if

## 2020-03-28 LAB
ALBUMIN SERPL-MCNC: 3.1 GM/DL (ref 3.4–5)
ALP BLD-CCNC: 70 IU/L (ref 40–128)
ALT SERPL-CCNC: 14 U/L (ref 10–40)
ANION GAP SERPL CALCULATED.3IONS-SCNC: 9 MMOL/L (ref 4–16)
AST SERPL-CCNC: 15 IU/L (ref 15–37)
BASOPHILS ABSOLUTE: 0 K/CU MM
BASOPHILS RELATIVE PERCENT: 0.5 % (ref 0–1)
BILIRUB SERPL-MCNC: 0.6 MG/DL (ref 0–1)
BUN BLDV-MCNC: 14 MG/DL (ref 6–23)
CALCIUM SERPL-MCNC: 7.9 MG/DL (ref 8.3–10.6)
CHLORIDE BLD-SCNC: 111 MMOL/L (ref 99–110)
CO2: 21 MMOL/L (ref 21–32)
CREAT SERPL-MCNC: 1.1 MG/DL (ref 0.9–1.3)
DIFFERENTIAL TYPE: ABNORMAL
EOSINOPHILS ABSOLUTE: 0.2 K/CU MM
EOSINOPHILS RELATIVE PERCENT: 3.6 % (ref 0–3)
GFR AFRICAN AMERICAN: >60 ML/MIN/1.73M2
GFR NON-AFRICAN AMERICAN: >60 ML/MIN/1.73M2
GLUCOSE BLD-MCNC: 91 MG/DL (ref 70–99)
HCT VFR BLD CALC: 38.3 % (ref 42–52)
HEMOGLOBIN: 12.5 GM/DL (ref 13.5–18)
IMMATURE NEUTROPHIL %: 0.2 % (ref 0–0.43)
INR BLD: 1.26 INDEX
LYMPHOCYTES ABSOLUTE: 0.8 K/CU MM
LYMPHOCYTES RELATIVE PERCENT: 13.9 % (ref 24–44)
MCH RBC QN AUTO: 31.3 PG (ref 27–31)
MCHC RBC AUTO-ENTMCNC: 32.6 % (ref 32–36)
MCV RBC AUTO: 95.8 FL (ref 78–100)
MONOCYTES ABSOLUTE: 0.5 K/CU MM
MONOCYTES RELATIVE PERCENT: 8.3 % (ref 0–4)
NUCLEATED RBC %: 0 %
PDW BLD-RTO: 11.9 % (ref 11.7–14.9)
PLATELET # BLD: 116 K/CU MM (ref 140–440)
PMV BLD AUTO: 10.4 FL (ref 7.5–11.1)
POTASSIUM SERPL-SCNC: 3.9 MMOL/L (ref 3.5–5.1)
PROTHROMBIN TIME: 15.3 SECONDS (ref 11.7–14.5)
RBC # BLD: 4 M/CU MM (ref 4.6–6.2)
SEGMENTED NEUTROPHILS ABSOLUTE COUNT: 4.3 K/CU MM
SEGMENTED NEUTROPHILS RELATIVE PERCENT: 73.5 % (ref 36–66)
SODIUM BLD-SCNC: 141 MMOL/L (ref 135–145)
TOTAL IMMATURE NEUTOROPHIL: 0.01 K/CU MM
TOTAL NUCLEATED RBC: 0 K/CU MM
TOTAL PROTEIN: 4.8 GM/DL (ref 6.4–8.2)
TROPONIN T: <0.01 NG/ML
WBC # BLD: 5.9 K/CU MM (ref 4–10.5)

## 2020-03-28 PROCEDURE — 6370000000 HC RX 637 (ALT 250 FOR IP): Performed by: INTERNAL MEDICINE

## 2020-03-28 PROCEDURE — 2140000000 HC CCU INTERMEDIATE R&B

## 2020-03-28 PROCEDURE — 84484 ASSAY OF TROPONIN QUANT: CPT

## 2020-03-28 PROCEDURE — 85610 PROTHROMBIN TIME: CPT

## 2020-03-28 PROCEDURE — 85025 COMPLETE CBC W/AUTO DIFF WBC: CPT

## 2020-03-28 PROCEDURE — 80053 COMPREHEN METABOLIC PANEL: CPT

## 2020-03-28 PROCEDURE — 2580000003 HC RX 258: Performed by: INTERNAL MEDICINE

## 2020-03-28 RX ORDER — AMIODARONE HYDROCHLORIDE 200 MG/1
200 TABLET ORAL DAILY
Status: DISCONTINUED | OUTPATIENT
Start: 2020-03-28 | End: 2020-03-29 | Stop reason: HOSPADM

## 2020-03-28 RX ADMIN — SODIUM CHLORIDE, PRESERVATIVE FREE 10 ML: 5 INJECTION INTRAVENOUS at 20:15

## 2020-03-28 RX ADMIN — METOPROLOL SUCCINATE 50 MG: 50 TABLET, EXTENDED RELEASE ORAL at 20:15

## 2020-03-28 RX ADMIN — APIXABAN 5 MG: 5 TABLET, FILM COATED ORAL at 20:15

## 2020-03-28 RX ADMIN — SODIUM CHLORIDE: 9 INJECTION, SOLUTION INTRAVENOUS at 03:41

## 2020-03-28 RX ADMIN — ATORVASTATIN CALCIUM 40 MG: 40 TABLET, FILM COATED ORAL at 10:31

## 2020-03-28 RX ADMIN — SODIUM CHLORIDE, PRESERVATIVE FREE 10 ML: 5 INJECTION INTRAVENOUS at 10:32

## 2020-03-28 RX ADMIN — MULTIPLE VITAMINS W/ MINERALS TAB 1 TABLET: TAB at 10:31

## 2020-03-28 RX ADMIN — METOPROLOL SUCCINATE 50 MG: 50 TABLET, EXTENDED RELEASE ORAL at 10:31

## 2020-03-28 RX ADMIN — ASPIRIN 81 MG: 81 TABLET, COATED ORAL at 10:31

## 2020-03-28 RX ADMIN — AMIODARONE HYDROCHLORIDE 200 MG: 200 TABLET ORAL at 10:31

## 2020-03-28 RX ADMIN — APIXABAN 5 MG: 5 TABLET, FILM COATED ORAL at 10:31

## 2020-03-28 ASSESSMENT — PAIN SCALES - GENERAL
PAINLEVEL_OUTOF10: 0

## 2020-03-28 NOTE — PROGRESS NOTES
Daily Progress Note     awake alert  Feeling better  S/p CV from afib to sinus   Keep on oral meds  Home tomorrow  Increase activity   Syncope possible from afib RVR--not clear  holter in office shows afib   Carotid us negative recently in office      CT chest   Impression:        1. No CT evidence of a pulmonary embolism. 2. No acute intrapulmonary findings. 3. Mild multifocal pleural and parenchymal scarring. 4. Mild wedge compression deformity of T8, most likely chronic.  However, if  there is a history of acute thoracic back pain, consider further  characterization of the acuity of this abnormality with a dedicated thoracic  MRI. Cath--DICTATED -[id number]  NORMAL RIGHT HEART PRESSURE  LEFT MAIN   LAD STENT  LCX MILD DX  RCA MILD DX  LVEDP 10     MICHAEL--IMPRESSION:  1. No clot or thrombus noted in the left atrium, left atrial appendage  or LV cavity. 2.  LV function is preserved 50% to 55%. 3.  No ASD or PFO was noted. 4.  Tricuspid valve, pulmonic valve, mitral valve and aortic valve are  grossly normal.  5.  Ascending is normal.  6.  Pulmonary vein is normal.  7.  There is possibly a cystic structure noted in the left atrium. There is no color flow noted.       Objective:   /82   Pulse 66   Temp 98.3 °F (36.8 °C) (Oral)   Resp 11   Ht 5' 10\" (1.778 m)   Wt 166 lb (75.3 kg)   SpO2 98%   BMI 23.82 kg/m²       Intake/Output Summary (Last 24 hours) at 3/28/2020 0846  Last data filed at 3/28/2020 0522  Gross per 24 hour   Intake 1663.33 ml   Output 1600 ml   Net 63.33 ml       Medications:   Scheduled Meds:   amiodarone  200 mg Oral Daily    atorvastatin  40 mg Oral Daily    sodium chloride flush  10 mL Intravenous 2 times per day    aspirin EC  81 mg Oral Daily    therapeutic multivitamin-minerals  1 tablet Oral Daily    apixaban  5 mg Oral BID    metoprolol succinate  50 mg Oral BID      Infusions:     PRN Meds:  sodium chloride flush, acetaminophen **OR** acetaminophen, magnesium hydroxide, promethazine **OR** ondansetron, polyethylene glycol       Physical Exam:  Vitals:    03/28/20 0239   BP: 129/82   Pulse: 66   Resp: 11   Temp: 98.3 °F (36.8 °C)   SpO2: 98%        General: awake alert   Chest: Nontender  Cardiac: sinus   Lungs:Clear to auscultation and percussion. Abdomen: Soft, NT, ND, +BS  Extremities:  No edema   Vascular:  Equal 2+ peripheral pulses. Lab Data:  CBC:   Recent Labs     03/27/20 0600 03/28/20  0349   WBC 5.8 5.9   HGB 14.3 12.5*   HCT 43.5 38.3*   MCV 94.4 95.8   * 116*     BMP:   Recent Labs     03/27/20 0600 03/28/20  0349    141   K 4.0 3.9    111*   CO2 22 21   BUN 21 14   CREATININE 1.2 1.1     LIVER PROFILE:   Recent Labs     03/27/20 0600 03/28/20  0349   AST 21 15   ALT 18 14   BILITOT 0.7 0.6   ALKPHOS 76 70     PT/INR:   Recent Labs     03/27/20  0600 03/28/20  0349   PROTIME 11.0* 15.3*   INR 0.91 1.26     APTT:   Recent Labs     03/27/20 0600   APTT 24.9*     BNP:  No results for input(s): BNP in the last 72 hours.       Assessment:  Patient Active Problem List    Diagnosis Date Noted    A-fib [facility]) 03/27/2020       David Merritt MD 3/28/2020 8:46 AM

## 2020-03-29 VITALS
DIASTOLIC BLOOD PRESSURE: 95 MMHG | HEART RATE: 67 BPM | HEIGHT: 70 IN | BODY MASS INDEX: 23.61 KG/M2 | SYSTOLIC BLOOD PRESSURE: 150 MMHG | WEIGHT: 164.9 LBS | RESPIRATION RATE: 12 BRPM | TEMPERATURE: 98.3 F | OXYGEN SATURATION: 97 %

## 2020-03-29 PROCEDURE — 6370000000 HC RX 637 (ALT 250 FOR IP): Performed by: INTERNAL MEDICINE

## 2020-03-29 PROCEDURE — 2580000003 HC RX 258: Performed by: INTERNAL MEDICINE

## 2020-03-29 RX ORDER — AMIODARONE HYDROCHLORIDE 200 MG/1
200 TABLET ORAL DAILY
Qty: 30 TABLET | Refills: 3 | Status: SHIPPED | OUTPATIENT
Start: 2020-03-30 | End: 2021-04-28 | Stop reason: ALTCHOICE

## 2020-03-29 RX ORDER — BACITRACIN ZINC AND POLYMYXIN B SULFATE 500; 1000 [USP'U]/G; [USP'U]/G
OINTMENT TOPICAL
Qty: 1 TUBE | Refills: 1 | Status: SHIPPED | OUTPATIENT
Start: 2020-03-29 | End: 2020-04-05

## 2020-03-29 RX ORDER — METOPROLOL SUCCINATE 50 MG/1
50 TABLET, EXTENDED RELEASE ORAL 2 TIMES DAILY
Qty: 30 TABLET | Refills: 3 | Status: SHIPPED | OUTPATIENT
Start: 2020-03-29 | End: 2020-12-16 | Stop reason: CLARIF

## 2020-03-29 RX ADMIN — SODIUM CHLORIDE, PRESERVATIVE FREE 10 ML: 5 INJECTION INTRAVENOUS at 10:18

## 2020-03-29 RX ADMIN — ATORVASTATIN CALCIUM 40 MG: 40 TABLET, FILM COATED ORAL at 10:08

## 2020-03-29 RX ADMIN — METOPROLOL SUCCINATE 50 MG: 50 TABLET, EXTENDED RELEASE ORAL at 10:17

## 2020-03-29 RX ADMIN — AMIODARONE HYDROCHLORIDE 200 MG: 200 TABLET ORAL at 10:17

## 2020-03-29 RX ADMIN — ASPIRIN 81 MG: 81 TABLET, COATED ORAL at 10:17

## 2020-03-29 RX ADMIN — MULTIPLE VITAMINS W/ MINERALS TAB 1 TABLET: TAB at 10:08

## 2020-03-29 RX ADMIN — APIXABAN 5 MG: 5 TABLET, FILM COATED ORAL at 10:08

## 2020-03-29 ASSESSMENT — PAIN SCALES - GENERAL: PAINLEVEL_OUTOF10: 0

## 2020-03-29 NOTE — PROGRESS NOTES
PRN  polyethylene glycol, 17 g, Daily PRN          Electronically signed by Danyell Pack MD on 3/28/2020 at 10:04 PM

## 2020-03-29 NOTE — PLAN OF CARE
Problem: Falls - Risk of:  Goal: Will remain free from falls  Description: Will remain free from falls  3/28/2020 1103 by Micheline Carrera RN  Outcome: Ongoing  3/28/2020 0100 by Lauro Briceno RN  Outcome: Ongoing  Goal: Absence of physical injury  Description: Absence of physical injury  3/28/2020 1103 by Micheline Carrera RN  Outcome: Ongoing  3/28/2020 0100 by Lauro Briceno RN  Outcome: Ongoing     Problem:  Activity:  Goal: Ability to tolerate increased activity will improve  Description: Ability to tolerate increased activity will improve  3/28/2020 1103 by Micheline Carrera RN  Outcome: Ongoing  3/28/2020 0100 by Lauro Briceno RN  Outcome: Ongoing  Goal: Expression of feelings of increased energy will increase  Description: Expression of feelings of increased energy will increase  3/28/2020 1103 by Micheline Carrera RN  Outcome: Ongoing  3/28/2020 0100 by Lauro Briceno RN  Outcome: Ongoing     Problem: Cardiac:  Goal: Ability to maintain an adequate cardiac output will improve  Description: Ability to maintain an adequate cardiac output will improve  3/28/2020 1103 by Micheline Carrera RN  Outcome: Ongoing  3/28/2020 0100 by Lauro Briceno RN  Outcome: Ongoing  Goal: Complications related to the disease process, condition or treatment will be avoided or minimized  Description: Complications related to the disease process, condition or treatment will be avoided or minimized  3/28/2020 1103 by Micheline Carrera RN  Outcome: Ongoing  3/28/2020 0100 by Lauro Briceno RN  Outcome: Ongoing     Problem: Coping:  Goal: Level of anxiety will decrease  Description: Level of anxiety will decrease  3/28/2020 1103 by Micheline Carrera RN  Outcome: Ongoing  3/28/2020 0100 by Lauro Briceno RN  Outcome: Ongoing  Goal: General experience of comfort will improve  Description: General experience of comfort will improve  3/28/2020 1103 by Micheline Carrera RN  Outcome: Ongoing  3/28/2020 0100 by Lauro Briceno RN  Outcome: Ongoing     Problem: Health
Problem: Falls - Risk of:  Goal: Will remain free from falls  Description: Will remain free from falls  3/29/2020 0025 by Kiara Galvan RN  Outcome: Ongoing  3/28/2020 1103 by Earle Sidhu RN  Outcome: Ongoing  Goal: Absence of physical injury  Description: Absence of physical injury  3/29/2020 0025 by Kiara Galvan RN  Outcome: Ongoing  3/28/2020 1103 by Earle Sidhu RN  Outcome: Ongoing     Problem:  Activity:  Goal: Ability to tolerate increased activity will improve  Description: Ability to tolerate increased activity will improve  3/29/2020 0025 by Kiara Galvan RN  Outcome: Ongoing  3/28/2020 1103 by Earle Sidhu RN  Outcome: Ongoing  Goal: Expression of feelings of increased energy will increase  Description: Expression of feelings of increased energy will increase  3/29/2020 0025 by Kiara Galvan RN  Outcome: Ongoing  3/28/2020 1103 by Earle Sidhu RN  Outcome: Ongoing     Problem: Cardiac:  Goal: Ability to maintain an adequate cardiac output will improve  Description: Ability to maintain an adequate cardiac output will improve  3/29/2020 0025 by Kiara Galvan RN  Outcome: Ongoing  3/28/2020 1103 by Earle Sidhu RN  Outcome: Ongoing  Goal: Complications related to the disease process, condition or treatment will be avoided or minimized  Description: Complications related to the disease process, condition or treatment will be avoided or minimized  3/29/2020 0025 by Kiara Galvan RN  Outcome: Ongoing  3/28/2020 1103 by Earle Sidhu RN  Outcome: Ongoing     Problem: Coping:  Goal: Level of anxiety will decrease  Description: Level of anxiety will decrease  3/29/2020 0025 by Kiara Galvan RN  Outcome: Ongoing  3/28/2020 1103 by Earle Sidhu RN  Outcome: Ongoing  Goal: General experience of comfort will improve  Description: General experience of comfort will improve  3/29/2020 0025 by Kiara Galvan RN  Outcome: Ongoing  3/28/2020 1103 by Earle Sidhu RN  Outcome: Ongoing     Problem: Health
Problem: Health Behavior:  Goal: Ability to manage health-related needs will improve  Description: Ability to manage health-related needs will improve  3/28/2020 0100 by Lauro Briceno RN  Outcome: Ongoing  3/27/2020 1256 by Lorna Villa RN  Outcome: Ongoing     Problem: Safety:  Goal: Ability to remain free from injury will improve  Description: Ability to remain free from injury will improve  3/28/2020 0100 by Lauro Briceno RN  Outcome: Ongoing  3/27/2020 1256 by Lorna Villa RN  Outcome: Ongoing  Goal: Will show no signs and symptoms of excessive bleeding  Description: Will show no signs and symptoms of excessive bleeding  3/28/2020 0100 by Lauro Briceno RN  Outcome: Ongoing  3/27/2020 1256 by Lorna Villa RN  Outcome: Ongoing     Problem: KNOWLEDGE DEFICIT  Goal: Patient/S.O. demonstrates understanding of disease process, treatment plan, medications, and discharge instructions.   3/28/2020 0100 by Lauro Briceno RN  Outcome: Ongoing  3/27/2020 1256 by Lorna Villa RN  Outcome: Ongoing     Problem: DISCHARGE BARRIERS  Goal: Patient's continuum of care needs are met  3/28/2020 0100 by Lauro Briceno RN  Outcome: Ongoing  3/27/2020 1256 by Lorna Villa RN  Outcome: Ongoing

## 2020-03-29 NOTE — DISCHARGE SUMMARY
cardiology is recommended     Skin tears bilateral forearms as a result of syncope- we provided wound care while he was here    Concern for LUCILLE: per final cardio note this admission he is to have a sleep study OP     HTN - on metoprolol and verapamil at home - verapamil was held on admission since Cardizem drip was running and was not restarted upon discharge. Metoprolol was increased. Blood pressure was a little high the day of discharge, so should monitor him carefully. Other history/problems     HLD - Lipitor as at home, resume     Never smoker     Remote history of basal cell carcinoma left cheek that is post excision     Mild thrombocytopenia - platelet count 467 -will follow up to primary care physician. He has some records in the hospital's electronic health record going back to 2011 where intermittent CBCs were checked. In 2013 he had a platelet count of 609, but most of his platelet counts have been normal.  He has never had platelet counts in the 120s and 110's before. Follow-up outpatient.  -of note, he is on ASA and Eliquis has been added, would monitor him outpatient    Left atrium MICHAEL finding: per cardiology MICHAEL report \"there is possibly a cystic structure noted in the left atrium\" - would continue follow up with cardiology     Mild wedge compression deformity of T8, finding seen incidentally on CTA chest most likely chronic per radiologist - result shared with patient, asked him to follow up, he is aware. He stated he is not having back pain. PCP: Dr. Latia Rubi     The patient expressed appropriate understanding of and agreement with the discharge recommendations, medications, and plan.      Consults this admission:  IP CONSULT TO CARDIOLOGY  IP CONSULT TO HOSPITALIST    Discharge Instruction:   Follow up appointments: Cardiology  Primary care physician:  within 2 weeks    Diet:  cardiac diet   Activity: activity as tolerated  Disposition: Discharged to:   [x]Home, [][facility], []SNF, []Acute Rehab, []Hospice   Condition on discharge: Stable    Discharge Medications:      Orlin Stein   Home Medication Instructions NCX:[id number]    Printed on:04/04/20 1022   Medication Information                      amiodarone (CORDARONE) 200 MG tablet  Take 1 tablet by mouth daily             apixaban (ELIQUIS) 5 MG TABS tablet  Take 1 tablet by mouth 2 times daily             aspirin EC 81 MG EC tablet  Take 1 tablet by mouth daily             Atorvastatin Calcium (LIPITOR PO)  Take 40 mg by mouth daily              bacitracin-polymyxin b (POLYSPORIN) 500-51624 UNIT/GM ointment  Apply topically daily. metoprolol succinate (TOPROL XL) 50 MG extended release tablet  Take 1 tablet by mouth 2 times daily             Multiple Vitamins-Minerals (THERAPEUTIC MULTIVITAMIN-MINERALS) tablet  Take 1 tablet by mouth daily                 Objective Findings at Discharge:   BP (!) 150/95   Pulse 67   Temp 98.3 °F (36.8 °C) (Oral)   Resp 12   Ht 5' 10\" (1.778 m)   Wt 164 lb 14.4 oz (74.8 kg)   SpO2 97%   BMI 23.66 kg/m²            PHYSICAL EXAM   GEN Awake male, sitting upright in bed in no apparent distress. Appears given age. LABS:    CBC:   Lab Results   Component Value Date    WBC 5.9 03/28/2020    HGB 12.5 03/28/2020    HCT 38.3 03/28/2020    MCV 95.8 03/28/2020     03/28/2020     BMP:   Lab Results   Component Value Date     03/28/2020    K 3.9 03/28/2020     03/28/2020    CO2 21 03/28/2020    BUN 14 03/28/2020    CREATININE 1.1 03/28/2020    CALCIUM 7.9 03/28/2020     IMAGING:     CTA CHEST W CONTRAST [[id number]] Collected: 03/27/20 0757     Order Status: Completed Updated: 03/27/20 0814     Narrative:       EXAMINATION:  CTA OF THE CHEST 3/27/2020 7:47 am    TECHNIQUE:  CTA of the chest was performed after the administration of intravenous  contrast.  Multiplanar reformatted images are provided for review. MIP  images are provided for review.  Dose modulation, iterative reconstruction,  and/or weight based adjustment of the mA/kV was utilized to reduce the  radiation dose to as low as reasonably achievable. COMPARISON:  No prior chest CT for comparison. Comparison made to chest x-ray dated  03/27/2020. HISTORY:  ORDERING SYSTEM PROVIDED HISTORY: elevated dimer  TECHNOLOGIST PROVIDED HISTORY:  Reason for exam:->elevated dimer    Shortness of breath. FINDINGS:  Pulmonary Arteries: Pulmonary arteries are adequately opacified for  evaluation. No evidence of intraluminal filling defect to suggest pulmonary  embolism. Main pulmonary artery is normal in caliber. Mediastinum: The thyroid is unremarkable. There is no pathologic  lymphadenopathy. There is mild atherosclerotic disease of the intrathoracic  aorta, without evidence of aneurysm or dissection. There is mild coronary  atherosclerosis. No pericardial abnormality is identified. Lungs/pleura: There are minimal layering mucous secretions within the lower  thoracic trachea. The central airways are otherwise patent. There is mild  dependent atelectasis within the bilateral lower lobes. Mild consolidative  opacity within the inferior lingula likely reflects parenchymal scar. There  is additional curvilinear parenchymal scar noted within the anteromedial  right upper lobe. No focus of acute airspace consolidation is identified to  suggest pneumonia. There is no evidence of a pneumothorax or pleural  effusion. No suspicious pulmonary nodule or mass is identified. Upper Abdomen: The visualized portions of the adrenal glands are  unremarkable. There is a nonspecific 15 mm low-density lesion within the  central liver near the gallbladder fossa, likely either a cyst or hemangioma. The remainder of the upper abdomen is unremarkable. Soft Tissues/Bones: There is multilevel degenerative change throughout the  thoracic spine with diffuse idiopathic skeletal hyperostosis evident.   There  is a mild wedge compression deformity of T8, likely chronic. No osteolytic  or osteoblastic lesion is identified. Impression:       1. No CT evidence of a pulmonary embolism. 2. No acute intrapulmonary findings. 3. Mild multifocal pleural and parenchymal scarring. 4. Mild wedge compression deformity of T8, most likely chronic. However, if  there is a history of acute thoracic back pain, consider further  characterization of the acuity of this abnormality with a dedicated thoracic  MRI. CT Head WO Contrast [[id number]] Collected: 03/27/20 0758     Order Status: Completed Updated: 03/27/20 0802     Narrative:       EXAMINATION:  CT OF THE HEAD WITHOUT CONTRAST  3/27/2020 7:57 am    TECHNIQUE:  CT of the head was performed without the administration of intravenous  contrast. Dose modulation, iterative reconstruction, and/or weight based  adjustment of the mA/kV was utilized to reduce the radiation dose to as low  as reasonably achievable. COMPARISON:  None. HISTORY:  ORDERING SYSTEM PROVIDED HISTORY: syncope  TECHNOLOGIST PROVIDED HISTORY:  Reason for exam:->syncope  Has a \"code stroke\" or \"stroke alert\" been called? ->No  Reason for Exam: syncope  Acuity: Acute  Type of Exam: Initial    FINDINGS:  BRAIN/VENTRICLES: There is no acute intracerebral hemorrhage or extra-axial  fluid collection. There is mild cerebral atrophy with mild periventricular  white matter small vessel ischemic disease. ORBITS: The orbits are unremarkable. SINUSES: The visualized paranasal sinuses and mastoid air cells are clear. SOFT TISSUES/SKULL:  The calvarium is intact. Impression:       1. No acute intracranial abnormality.      VL DUP CAROTID BILATERAL [[id number]] Updated: 03/27/20 0716     Order Status: Canceled      CT CHEST W CONTRAST [[id number]]      Order Status: Canceled      XR CHEST PORTABLE [[id number]] Collected: 03/27/20 0629     Order Status: Completed Updated: 03/27/20 0139     Narrative:       EXAMINATION:  ONE XRAY

## 2020-04-02 ENCOUNTER — HOSPITAL ENCOUNTER (OUTPATIENT)
Age: 80
Discharge: HOME | End: 2020-04-02
Payer: MEDICARE

## 2020-04-02 LAB
T4 FREE: 1.22 NG/DL (ref 0.9–1.8)
TSH HIGH SENSITIVITY: 2.35 UIU/ML (ref 0.27–4.2)

## 2020-04-02 PROCEDURE — 84439 ASSAY OF FREE THYROXINE: CPT

## 2020-04-02 PROCEDURE — 84443 ASSAY THYROID STIM HORMONE: CPT

## 2020-04-02 PROCEDURE — 36415 COLL VENOUS BLD VENIPUNCTURE: CPT

## 2020-04-28 ENCOUNTER — HOSPITAL ENCOUNTER (OUTPATIENT)
Dept: CT IMAGING | Age: 80
Discharge: HOME | End: 2020-04-28
Payer: MEDICARE

## 2020-04-28 PROCEDURE — 6360000004 HC RX CONTRAST MEDICATION: Performed by: PHYSICIAN ASSISTANT

## 2020-04-28 PROCEDURE — 70460 CT HEAD/BRAIN W/DYE: CPT

## 2020-04-28 PROCEDURE — 2580000003 HC RX 258: Performed by: PHYSICIAN ASSISTANT

## 2020-04-28 RX ORDER — SODIUM CHLORIDE 0.9 % (FLUSH) 0.9 %
10 SYRINGE (ML) INJECTION ONCE
Status: COMPLETED | OUTPATIENT
Start: 2020-04-28 | End: 2020-04-28

## 2020-04-28 RX ADMIN — Medication 10 ML: at 09:38

## 2020-04-28 RX ADMIN — IOPAMIDOL 75 ML: 755 INJECTION, SOLUTION INTRAVENOUS at 09:38

## 2020-05-04 ENCOUNTER — HOSPITAL ENCOUNTER (OUTPATIENT)
Age: 80
Discharge: HOME | End: 2020-05-04
Payer: MEDICARE

## 2020-05-04 LAB
ANION GAP SERPL CALCULATED.3IONS-SCNC: 13 MMOL/L (ref 4–16)
BASOPHILS ABSOLUTE: 0.1 K/CU MM
BASOPHILS RELATIVE PERCENT: 0.5 % (ref 0–1)
BUN BLDV-MCNC: 30 MG/DL (ref 6–23)
CALCIUM SERPL-MCNC: 9 MG/DL (ref 8.3–10.6)
CHLORIDE BLD-SCNC: 107 MMOL/L (ref 99–110)
CO2: 21 MMOL/L (ref 21–32)
CREAT SERPL-MCNC: 1.4 MG/DL (ref 0.9–1.3)
DIFFERENTIAL TYPE: ABNORMAL
EOSINOPHILS ABSOLUTE: 0.1 K/CU MM
EOSINOPHILS RELATIVE PERCENT: 1.1 % (ref 0–3)
GFR AFRICAN AMERICAN: 59 ML/MIN/1.73M2
GFR NON-AFRICAN AMERICAN: 49 ML/MIN/1.73M2
GLUCOSE BLD-MCNC: 83 MG/DL (ref 70–99)
HCT VFR BLD CALC: 45.3 % (ref 42–52)
HEMOGLOBIN: 14.1 GM/DL (ref 13.5–18)
IMMATURE NEUTROPHIL %: 0.4 % (ref 0–0.43)
LYMPHOCYTES ABSOLUTE: 1.5 K/CU MM
LYMPHOCYTES RELATIVE PERCENT: 16 % (ref 24–44)
MCH RBC QN AUTO: 30.2 PG (ref 27–31)
MCHC RBC AUTO-ENTMCNC: 31.1 % (ref 32–36)
MCV RBC AUTO: 97 FL (ref 78–100)
MONOCYTES ABSOLUTE: 1 K/CU MM
MONOCYTES RELATIVE PERCENT: 11 % (ref 0–4)
NUCLEATED RBC %: 0 %
PDW BLD-RTO: 12 % (ref 11.7–14.9)
PLATELET # BLD: 214 K/CU MM (ref 140–440)
PMV BLD AUTO: 11 FL (ref 7.5–11.1)
POTASSIUM SERPL-SCNC: 4.6 MMOL/L (ref 3.5–5.1)
RBC # BLD: 4.67 M/CU MM (ref 4.6–6.2)
SEGMENTED NEUTROPHILS ABSOLUTE COUNT: 6.6 K/CU MM
SEGMENTED NEUTROPHILS RELATIVE PERCENT: 71 % (ref 36–66)
SODIUM BLD-SCNC: 141 MMOL/L (ref 135–145)
TOTAL IMMATURE NEUTOROPHIL: 0.04 K/CU MM
TOTAL NUCLEATED RBC: 0 K/CU MM
WBC # BLD: 9.3 K/CU MM (ref 4–10.5)

## 2020-05-04 PROCEDURE — 80048 BASIC METABOLIC PNL TOTAL CA: CPT

## 2020-05-04 PROCEDURE — 36415 COLL VENOUS BLD VENIPUNCTURE: CPT

## 2020-05-04 PROCEDURE — 85025 COMPLETE CBC W/AUTO DIFF WBC: CPT

## 2020-08-20 ENCOUNTER — HOSPITAL ENCOUNTER (EMERGENCY)
Age: 80
Discharge: HOME | End: 2020-08-21
Attending: EMERGENCY MEDICINE
Payer: MEDICARE

## 2020-08-20 LAB
ALBUMIN SERPL-MCNC: 3.6 GM/DL (ref 3.4–5)
ALP BLD-CCNC: 67 IU/L (ref 40–129)
ALT SERPL-CCNC: 28 U/L (ref 10–40)
ANION GAP SERPL CALCULATED.3IONS-SCNC: 10 MMOL/L (ref 4–16)
AST SERPL-CCNC: 26 IU/L (ref 15–37)
BASOPHILS ABSOLUTE: 0 K/CU MM
BASOPHILS RELATIVE PERCENT: 0.4 % (ref 0–1)
BILIRUB SERPL-MCNC: 0.7 MG/DL (ref 0–1)
BUN BLDV-MCNC: 25 MG/DL (ref 6–23)
CALCIUM SERPL-MCNC: 8.5 MG/DL (ref 8.3–10.6)
CHLORIDE BLD-SCNC: 99 MMOL/L (ref 99–110)
CO2: 23 MMOL/L (ref 21–32)
CREAT SERPL-MCNC: 1.5 MG/DL (ref 0.9–1.3)
DIFFERENTIAL TYPE: ABNORMAL
EOSINOPHILS ABSOLUTE: 0.1 K/CU MM
EOSINOPHILS RELATIVE PERCENT: 1.9 % (ref 0–3)
GFR AFRICAN AMERICAN: 55 ML/MIN/1.73M2
GFR NON-AFRICAN AMERICAN: 45 ML/MIN/1.73M2
GLUCOSE BLD-MCNC: 192 MG/DL (ref 70–99)
HCT VFR BLD CALC: 40.7 % (ref 42–52)
HEMOGLOBIN: 13.5 GM/DL (ref 13.5–18)
IMMATURE NEUTROPHIL %: 0.2 % (ref 0–0.43)
LYMPHOCYTES ABSOLUTE: 0.9 K/CU MM
LYMPHOCYTES RELATIVE PERCENT: 15.8 % (ref 24–44)
MAGNESIUM: 2.1 MG/DL (ref 1.8–2.4)
MCH RBC QN AUTO: 31.5 PG (ref 27–31)
MCHC RBC AUTO-ENTMCNC: 33.2 % (ref 32–36)
MCV RBC AUTO: 95.1 FL (ref 78–100)
MONOCYTES ABSOLUTE: 0.5 K/CU MM
MONOCYTES RELATIVE PERCENT: 9.5 % (ref 0–4)
NUCLEATED RBC %: 0 %
PDW BLD-RTO: 12.8 % (ref 11.7–14.9)
PLATELET # BLD: 124 K/CU MM (ref 140–440)
PMV BLD AUTO: 10.4 FL (ref 7.5–11.1)
POTASSIUM SERPL-SCNC: 3.5 MMOL/L (ref 3.5–5.1)
RBC # BLD: 4.28 M/CU MM (ref 4.6–6.2)
SEGMENTED NEUTROPHILS ABSOLUTE COUNT: 3.9 K/CU MM
SEGMENTED NEUTROPHILS RELATIVE PERCENT: 72.2 % (ref 36–66)
SODIUM BLD-SCNC: 132 MMOL/L (ref 135–145)
TOTAL IMMATURE NEUTOROPHIL: 0.01 K/CU MM
TOTAL NUCLEATED RBC: 0 K/CU MM
TOTAL PROTEIN: 5.7 GM/DL (ref 6.4–8.2)
WBC # BLD: 5.4 K/CU MM (ref 4–10.5)

## 2020-08-20 PROCEDURE — 80053 COMPREHEN METABOLIC PANEL: CPT

## 2020-08-20 PROCEDURE — 83735 ASSAY OF MAGNESIUM: CPT

## 2020-08-20 PROCEDURE — 93005 ELECTROCARDIOGRAM TRACING: CPT | Performed by: EMERGENCY MEDICINE

## 2020-08-20 PROCEDURE — 85025 COMPLETE CBC W/AUTO DIFF WBC: CPT

## 2020-08-20 PROCEDURE — 99285 EMERGENCY DEPT VISIT HI MDM: CPT

## 2020-08-20 RX ORDER — 0.9 % SODIUM CHLORIDE 0.9 %
500 INTRAVENOUS SOLUTION INTRAVENOUS ONCE
Status: COMPLETED | OUTPATIENT
Start: 2020-08-20 | End: 2020-08-21

## 2020-08-20 ASSESSMENT — PAIN SCALES - GENERAL: PAINLEVEL_OUTOF10: 0

## 2020-08-21 VITALS
TEMPERATURE: 98.4 F | RESPIRATION RATE: 18 BRPM | WEIGHT: 170 LBS | OXYGEN SATURATION: 99 % | SYSTOLIC BLOOD PRESSURE: 111 MMHG | BODY MASS INDEX: 24.34 KG/M2 | HEART RATE: 89 BPM | DIASTOLIC BLOOD PRESSURE: 79 MMHG | HEIGHT: 70 IN

## 2020-08-21 PROCEDURE — 96360 HYDRATION IV INFUSION INIT: CPT

## 2020-08-21 PROCEDURE — 2580000003 HC RX 258: Performed by: EMERGENCY MEDICINE

## 2020-08-21 PROCEDURE — 93010 ELECTROCARDIOGRAM REPORT: CPT | Performed by: INTERNAL MEDICINE

## 2020-08-21 RX ADMIN — SODIUM CHLORIDE 500 ML: 9 INJECTION, SOLUTION INTRAVENOUS at 00:08

## 2020-08-21 NOTE — ED PROVIDER NOTES
Triage Chief Complaint:   Hypotension (sig decrease in bp when standing, dizziness)    [place]:  Chalo Mcguire is a 70 y.o. male that presents with lightheadedness and nausea. The patient states that he was eating dinner around 9 PM when he had the onset of lightheadedness and nausea. Denies any vertigo, vision changes, motor or sensory deficits, headaches. States that his symptoms are worse with standing. States that his blood pressures have been running low especially with standing. Denies any chest pain, shortness of breath, abdominal pain, vomiting, diarrhea, preceding illness. Denies any recent sick contacts or travel. No recent medication changes. He has been eating and drinking well. ROS:  At least 10 systems reviewed and otherwise acutely negative except as in the [address] Past Medical History:   Diagnosis Date    Arthritis     CAD (coronary artery disease)     Hyperlipidemia     Hypertension      Past Surgical History:   Procedure Laterality Date    COLONOSCOPY  2/4/13    Diverticulosis    CYST REMOVAL      baker cyst on right knee    HERNIA REPAIR      PTCA       No family history on file.   Social History     Socioeconomic History    Marital status:      Spouse name: Not on file    Number of children: Not on file    Years of education: Not on file    Highest education level: Not on file   Occupational History    Not on file   Social Needs    Financial resource strain: Not on file    Food insecurity     Worry: Not on file     Inability: Not on file    Transportation needs     Medical: Not on file     Non-medical: Not on file   Tobacco Use    Smoking status: Never Smoker    Smokeless tobacco: Never Used   Substance and Sexual Activity    Alcohol use: Yes     Comment: occas    Drug use: No    Sexual activity: Not on file   Lifestyle    Physical activity     Days per week: Not on file     Minutes per session: Not on file    Stress: Not on file   Relationships    Social connections     Talks on phone: Not on file     Gets together: Not on file     Attends Alevism service: Not on file     Active member of club or organization: Not on file     Attends meetings of clubs or organizations: Not on file     Relationship status: Not on file    Intimate partner violence     Fear of current or ex partner: Not on file     Emotionally abused: Not on file     Physically abused: Not on file     Forced sexual activity: Not on file   Other Topics Concern    Not on file   Social History Narrative    Not on file     No current facility-administered medications for this encounter. Current Outpatient Medications   Medication Sig Dispense Refill    albuterol sulfate  (90 Base) MCG/ACT inhaler Inhale 2 puffs into the lungs every 6 hours as needed for Wheezing 1 Inhaler 5    amLODIPine (NORVASC) 5 MG tablet TAKE 1 TABLET BY MOUTH EVERY DAY      amiodarone (CORDARONE) 200 MG tablet Take 1 tablet by mouth daily 30 tablet 3    apixaban (ELIQUIS) 5 MG TABS tablet Take 1 tablet by mouth 2 times daily 60 tablet 6    metoprolol succinate (TOPROL XL) 50 MG extended release tablet Take 1 tablet by mouth 2 times daily 30 tablet 3    Multiple Vitamins-Minerals (THERAPEUTIC MULTIVITAMIN-MINERALS) tablet Take 1 tablet by mouth daily      aspirin EC 81 MG EC tablet Take 1 tablet by mouth daily 90 tablet 1    Atorvastatin Calcium (LIPITOR PO) Take 40 mg by mouth daily        No Known Allergies    Nursing Notes Reviewed    Physical Exam:  ED Triage Vitals [08/20/20 2211]   Enc Vitals Group      /68      Pulse 89      Resp (S) 18      Temp 98.4 °F (36.9 °C)      Temp Source Oral      SpO2 (S) 99 %      Weight 170 lb (77.1 kg)      Height 5' 10\" (1.778 m)      Head Circumference       Peak Flow       Pain Score       Pain Loc       Pain Edu? Excl. in [address]? GENERAL APPEARANCE: Awake and alert. Cooperative. No acute distress. HEAD: Normocephalic. Atraumatic.   EYES: EOM's grossly Alkaline Phosphatase 67 40 - 129 IU/L    GFR Non- 45 (L) >60 mL/min/1.73m2    GFR  55 (L) >60 mL/min/1.73m2    Anion Gap 10 4 - 16   Magnesium   Result Value Ref Range    Magnesium 2.1 1.8 - 2.4 mg/dl      Radiographs (if obtained):  [] The following radiograph was interpreted by myself in the absence of a radiologist:  [] Radiologist's Report Reviewed:    EKG (if obtained): (All EKG's are interpreted by myself in the absence of a cardiologist)  12 lead EKG as interpreted by me reveals normal sinus rhythm. Axis is normal. There are no ischemic ST elevations or other suspicious ST changes;  QRS interval is narrow, QT interval is not prolonged. Final interpretation: Normal sinus rhythm. Age-indeterminate septal infarct. MDM:  Plan of care is discussed thoroughly with the patient and family if present. If performed, all imaging and lab work also discussed with patient. All relevant prior results and chart reviewed if available. Patient presents as above. He presents with postural lightheadedness. Vital signs on arrival are normal.  He is in no acute distress. Denies any complaints consistent with acute CVA/TIA that would require further work-up at this time. He does not have any focal neurologic complaints and does not describe vertigo. He is describing orthostatic type symptoms. Blood pressure lying down is 111/68 and 85/61 while standing with an increase in heart rate from 82-99. Patient started on IV fluids. EKG does not show any ischemic changes. Patient states that his symptoms are resolved after fluids. He ambulates without difficulty. Metabolic work-up is unremarkable. I feel that he is appropriate for discharge home, close follow-up with PCP. He is agreeable with this plan of care. Clinical Impression:  1.  Lightheaded      (Please note that portions of this note may have been completed with a voice recognition program. Efforts were made to edit the dictations but occasionally words are mis-transcribed.)    Camillo Gall, MD Kathleen Brittle, MD  08/21/20 3287

## 2020-08-28 LAB
EKG ATRIAL RATE: 88 BPM
EKG DIAGNOSIS: NORMAL
EKG P AXIS: -12 DEGREES
EKG P-R INTERVAL: 128 MS
EKG Q-T INTERVAL: 366 MS
EKG QRS DURATION: 94 MS
EKG QTC CALCULATION (BAZETT): 442 MS
EKG R AXIS: -26 DEGREES
EKG T AXIS: 38 DEGREES
EKG VENTRICULAR RATE: 88 BPM

## 2020-08-31 ENCOUNTER — HOSPITAL ENCOUNTER (EMERGENCY)
Age: 80
Discharge: HOME | End: 2020-08-31
Payer: MEDICARE

## 2020-08-31 ENCOUNTER — APPOINTMENT (OUTPATIENT)
Dept: ULTRASOUND IMAGING | Age: 80
End: 2020-08-31
Payer: MEDICARE

## 2020-08-31 VITALS
TEMPERATURE: 98.4 F | HEART RATE: 78 BPM | DIASTOLIC BLOOD PRESSURE: 86 MMHG | OXYGEN SATURATION: 99 % | RESPIRATION RATE: 17 BRPM | SYSTOLIC BLOOD PRESSURE: 128 MMHG

## 2020-08-31 PROCEDURE — 93971 EXTREMITY STUDY: CPT

## 2020-08-31 PROCEDURE — 99283 EMERGENCY DEPT VISIT LOW MDM: CPT

## 2020-08-31 RX ORDER — NAPROXEN 500 MG/1
500 TABLET ORAL 2 TIMES DAILY PRN
Qty: 20 TABLET | Refills: 0 | Status: ON HOLD | OUTPATIENT
Start: 2020-08-31 | End: 2020-12-18 | Stop reason: HOSPADM

## 2020-08-31 ASSESSMENT — PAIN DESCRIPTION - ORIENTATION: ORIENTATION: LEFT

## 2020-08-31 ASSESSMENT — PAIN DESCRIPTION - PAIN TYPE: TYPE: ACUTE PAIN

## 2020-08-31 ASSESSMENT — PAIN DESCRIPTION - LOCATION: LOCATION: ARM

## 2020-08-31 ASSESSMENT — PAIN DESCRIPTION - DESCRIPTORS: DESCRIPTORS: CONSTANT

## 2020-08-31 ASSESSMENT — PAIN SCALES - GENERAL: PAINLEVEL_OUTOF10: 6

## 2020-09-01 NOTE — ED PROVIDER NOTES
Dispense Refill    naproxen (NAPROSYN) 500 MG tablet Take 1 tablet by mouth 2 times daily as needed for Pain 20 tablet 0    albuterol sulfate  (90 Base) MCG/ACT inhaler Inhale 2 puffs into the lungs every 6 hours as needed for Wheezing 1 Inhaler 5    amLODIPine (NORVASC) 5 MG tablet TAKE 1 TABLET BY MOUTH EVERY DAY      amiodarone (CORDARONE) 200 MG tablet Take 1 tablet by mouth daily 30 tablet 3    apixaban (ELIQUIS) 5 MG TABS tablet Take 1 tablet by mouth 2 times daily 60 tablet 6    metoprolol succinate (TOPROL XL) 50 MG extended release tablet Take 1 tablet by mouth 2 times daily 30 tablet 3    Multiple Vitamins-Minerals (THERAPEUTIC MULTIVITAMIN-MINERALS) tablet Take 1 tablet by mouth daily      aspirin EC 81 MG EC tablet Take 1 tablet by mouth daily 90 tablet 1    Atorvastatin Calcium (LIPITOR PO) Take 40 mg by mouth daily          ALLERGIES    No Known Allergies    SOCIAL & FAMILY HISTORY    Social History     Socioeconomic History    Marital status:      Spouse name: None    Number of children: None    Years of education: None    Highest education level: None   Occupational History    None   Social Needs    Financial resource strain: None    Food insecurity     Worry: None     Inability: None    Transportation needs     Medical: None     Non-medical: None   Tobacco Use    Smoking status: Never Smoker    Smokeless tobacco: Never Used   Substance and Sexual Activity    Alcohol use: Yes     Comment: occas    Drug use: No    Sexual activity: None   Lifestyle    Physical activity     Days per week: None     Minutes per session: None    Stress: None   Relationships    Social connections     Talks on phone: None     Gets together: None     Attends [employer] service: None     Active member of club or organization: None     Attends meetings of clubs or organizations: None     Relationship status: None    Intimate partner violence     Fear of current or ex partner: None Emotionally abused: None     Physically abused: None     Forced sexual activity: None   Other Topics Concern    None   Social History Narrative    None     History reviewed. No pertinent family history. PHYSICAL EXAM    VITAL SIGNS: /86   Pulse 78   Temp 98.4 °F (36.9 °C) (Oral)   Resp 17   SpO2 99%   Constitutional:  Well developed, well nourished, no acute distress, non-toxic appearance   HENT:  Atraumatic    Musculoskeletal:   On inspection of the left upper extremity, there is evidence of bruising, purpura, hematomas, he has very thin skin. Mildly tender in the distal dorsal aspect of the forearm, there is a small knot in this area. No extravasating erythema radiating up the arm. No tenderness in the basilic vein distribution. He is neurovascularly intact. Examination of remaining extremities reveals active ROM of  joints without ligamentous laxity. Theres no obvious joint or bony deformity. Lymphatics:  No swollen nodes or streaks. Integument:  Well hydrated, no rash. skin intact  Vascular: affected extremity distally neurovascularly intact - pulses, sensation, and capillary refill intact. Neurologic:  Awake alert, normal flow of speech. Cranial nerves II through XII grossly intact. Psychiatric: Cooperative, pleasant affect    RADIOLOGY/PROCEDURES    Vl Dup Upper Extremity Venous Left    Result Date: 8/31/2020  EXAMINATION: DUPLEX ULTRASOUND OF THE LEFT UPPER EXTREMITY FOR DVT, 8/31/2020 5:39 pm TECHNIQUE: Duplex ultrasound and Doppler images were obtained of the deep venous structures of the upper extremity. COMPARISON: None HISTORY: ORDERING SYSTEM PROVIDED HISTORY: recent IV, pain, ro dvt TECHNOLOGIST PROVIDED HISTORY: Reason for exam:->recent IV, pain, ro dvt Acuity: Acute Type of Exam: Initial FINDINGS: There is normal flow and compressibility of the visualized deep venous structures. There is no evidence of echogenic thrombus.  Superficial thrombosis is noted anteriorly at the wrist in the area of clinical concern likely representing feeding vessels into the basilic vein. No evidence of DVT. Thrombus in the anterior aspect of the wrist involving superficial vessels which appear to track to the basilic vein. ED COURSE & MEDICAL DECISION MAKING      Patient presents as above. Emergent etiologies considered. Verbalizing concern for possible blood clot in the left upper extremity after failed IV attempts. Does have an area of tenderness to the dorsal aspect of the forearm. No signs of superimposed infection. No red streaking. Ultrasound demonstrating no underlying DVT, there is a superficial venous thrombosis in the anterior aspect of the wrist involving superficial vessels which appear to track to the basilic vein. We will advise warm compresses, NSAIDs, elevation. Will advise quick follow-up for any new or developing symptoms. With primary care and/or cardiology. At this point he will be discharged with reassurance. Patient agrees to return emergency department if symptoms worsen or any new symptoms develop. Vital signs and nursing notes reviewed during ED course. All pertinent Lab data and radiographic results reviewed with patient at bedside. The patient and/or the family were informed of the results of any tests/labs/imaging, the treatment plan, and time was allotted to answer questions. Clinical  IMPRESSION    1. Superficial venous thrombosis of left upper extremity      Comment: Please note this report has been produced using speech recognition software and may contain errors related to that system including errors in grammar, punctuation, and spelling, as well as words and phrases that may be inappropriate. If there are any questions or concerns please feel free to contact the dictating provider for clarification.       Chiquita Kenney 411, PA  08/31/20 2023

## 2020-10-13 PROBLEM — G47.33 OSA ON CPAP: Status: ACTIVE | Noted: 2020-10-13

## 2020-10-13 PROBLEM — Z99.89 OSA ON CPAP: Status: ACTIVE | Noted: 2020-10-13

## 2020-10-13 PROBLEM — J43.2 CENTRILOBULAR EMPHYSEMA (HCC): Status: ACTIVE | Noted: 2020-10-13

## 2020-10-13 PROBLEM — J43.2 CENTRILOBULAR EMPHYSEMA (HCC): Status: RESOLVED | Noted: 2020-10-13 | Resolved: 2020-10-13

## 2020-12-15 ENCOUNTER — APPOINTMENT (OUTPATIENT)
Dept: GENERAL RADIOLOGY | Age: 80
DRG: 310 | End: 2020-12-15
Payer: MEDICARE

## 2020-12-15 ENCOUNTER — HOSPITAL ENCOUNTER (INPATIENT)
Age: 80
LOS: 2 days | Discharge: HOME | DRG: 310 | End: 2020-12-18
Attending: EMERGENCY MEDICINE | Admitting: INTERNAL MEDICINE
Payer: MEDICARE

## 2020-12-15 LAB
ALBUMIN SERPL-MCNC: 2.9 GM/DL (ref 3.4–5)
ALP BLD-CCNC: 86 IU/L (ref 40–129)
ALT SERPL-CCNC: 19 U/L (ref 10–40)
ANION GAP SERPL CALCULATED.3IONS-SCNC: 7 MMOL/L (ref 4–16)
AST SERPL-CCNC: 22 IU/L (ref 15–37)
BASOPHILS ABSOLUTE: 0 K/CU MM
BASOPHILS RELATIVE PERCENT: 0.5 % (ref 0–1)
BILIRUB SERPL-MCNC: 0.2 MG/DL (ref 0–1)
BILIRUBIN DIRECT: 0.2 MG/DL (ref 0–0.3)
BILIRUBIN, INDIRECT: 0 MG/DL (ref 0–0.7)
BUN BLDV-MCNC: 19 MG/DL (ref 6–23)
CALCIUM SERPL-MCNC: 8.4 MG/DL (ref 8.3–10.6)
CHLORIDE BLD-SCNC: 107 MMOL/L (ref 99–110)
CO2: 25 MMOL/L (ref 21–32)
CREAT SERPL-MCNC: 1.2 MG/DL (ref 0.9–1.3)
DIFFERENTIAL TYPE: ABNORMAL
EOSINOPHILS ABSOLUTE: 0.2 K/CU MM
EOSINOPHILS RELATIVE PERCENT: 3 % (ref 0–3)
GFR AFRICAN AMERICAN: >60 ML/MIN/1.73M2
GFR NON-AFRICAN AMERICAN: 59 ML/MIN/1.73M2
GLUCOSE BLD-MCNC: 106 MG/DL (ref 70–99)
HCT VFR BLD CALC: 39 % (ref 42–52)
HEMOGLOBIN: 12.6 GM/DL (ref 13.5–18)
IMMATURE NEUTROPHIL %: 0.5 % (ref 0–0.43)
LIPASE: 29 IU/L (ref 13–60)
LYMPHOCYTES ABSOLUTE: 1.2 K/CU MM
LYMPHOCYTES RELATIVE PERCENT: 18.6 % (ref 24–44)
MCH RBC QN AUTO: 29.5 PG (ref 27–31)
MCHC RBC AUTO-ENTMCNC: 32.3 % (ref 32–36)
MCV RBC AUTO: 91.3 FL (ref 78–100)
MONOCYTES ABSOLUTE: 0.8 K/CU MM
MONOCYTES RELATIVE PERCENT: 12.1 % (ref 0–4)
NUCLEATED RBC %: 0 %
PDW BLD-RTO: 12.3 % (ref 11.7–14.9)
PLATELET # BLD: 163 K/CU MM (ref 140–440)
PMV BLD AUTO: 11 FL (ref 7.5–11.1)
POTASSIUM SERPL-SCNC: 3.5 MMOL/L (ref 3.5–5.1)
PRO-BNP: 276.5 PG/ML
RBC # BLD: 4.27 M/CU MM (ref 4.6–6.2)
SEGMENTED NEUTROPHILS ABSOLUTE COUNT: 4.1 K/CU MM
SEGMENTED NEUTROPHILS RELATIVE PERCENT: 65.3 % (ref 36–66)
SODIUM BLD-SCNC: 139 MMOL/L (ref 135–145)
TOTAL IMMATURE NEUTOROPHIL: 0.03 K/CU MM
TOTAL NUCLEATED RBC: 0 K/CU MM
TOTAL PROTEIN: 6.4 GM/DL (ref 6.4–8.2)
TROPONIN T: <0.01 NG/ML
WBC # BLD: 6.3 K/CU MM (ref 4–10.5)

## 2020-12-15 PROCEDURE — 83690 ASSAY OF LIPASE: CPT

## 2020-12-15 PROCEDURE — 83880 ASSAY OF NATRIURETIC PEPTIDE: CPT

## 2020-12-15 PROCEDURE — 82248 BILIRUBIN DIRECT: CPT

## 2020-12-15 PROCEDURE — 99285 EMERGENCY DEPT VISIT HI MDM: CPT

## 2020-12-15 PROCEDURE — 83735 ASSAY OF MAGNESIUM: CPT

## 2020-12-15 PROCEDURE — 84484 ASSAY OF TROPONIN QUANT: CPT

## 2020-12-15 PROCEDURE — 85025 COMPLETE CBC W/AUTO DIFF WBC: CPT

## 2020-12-15 PROCEDURE — 36415 COLL VENOUS BLD VENIPUNCTURE: CPT

## 2020-12-15 PROCEDURE — 93005 ELECTROCARDIOGRAM TRACING: CPT | Performed by: EMERGENCY MEDICINE

## 2020-12-15 PROCEDURE — 80053 COMPREHEN METABOLIC PANEL: CPT

## 2020-12-15 PROCEDURE — 71046 X-RAY EXAM CHEST 2 VIEWS: CPT

## 2020-12-15 RX ORDER — 0.9 % SODIUM CHLORIDE 0.9 %
500 INTRAVENOUS SOLUTION INTRAVENOUS ONCE
Status: COMPLETED | OUTPATIENT
Start: 2020-12-15 | End: 2020-12-16

## 2020-12-16 PROBLEM — I48.91 ATRIAL FIBRILLATION WITH RAPID VENTRICULAR RESPONSE (HCC): Status: ACTIVE | Noted: 2020-12-16

## 2020-12-16 LAB
BACTERIA: NEGATIVE /HPF
BILIRUBIN URINE: NEGATIVE MG/DL
BLOOD, URINE: NEGATIVE
CLARITY: CLEAR
COLOR: YELLOW
GLUCOSE, URINE: NEGATIVE MG/DL
KETONES, URINE: NEGATIVE MG/DL
LEUKOCYTE ESTERASE, URINE: NEGATIVE
MAGNESIUM: 2.2 MG/DL (ref 1.8–2.4)
NITRITE URINE, QUANTITATIVE: NEGATIVE
PH, URINE: 7 (ref 5–8)
PROTEIN UA: NEGATIVE MG/DL
RBC URINE: <1 /HPF (ref 0–3)
SPECIFIC GRAVITY UA: 1.01 (ref 1–1.03)
TRICHOMONAS: NORMAL /HPF
TSH HIGH SENSITIVITY: 1.8 UIU/ML (ref 0.27–4.2)
UROBILINOGEN, URINE: NORMAL MG/DL (ref 0.2–1)
WBC UA: NORMAL /HPF (ref 0–2)

## 2020-12-16 PROCEDURE — 6360000002 HC RX W HCPCS: Performed by: INTERNAL MEDICINE

## 2020-12-16 PROCEDURE — 2140000000 HC CCU INTERMEDIATE R&B

## 2020-12-16 PROCEDURE — 96361 HYDRATE IV INFUSION ADD-ON: CPT

## 2020-12-16 PROCEDURE — 81001 URINALYSIS AUTO W/SCOPE: CPT

## 2020-12-16 PROCEDURE — 94761 N-INVAS EAR/PLS OXIMETRY MLT: CPT

## 2020-12-16 PROCEDURE — 2580000003 HC RX 258: Performed by: INTERNAL MEDICINE

## 2020-12-16 PROCEDURE — 84443 ASSAY THYROID STIM HORMONE: CPT

## 2020-12-16 PROCEDURE — 6370000000 HC RX 637 (ALT 250 FOR IP): Performed by: INTERNAL MEDICINE

## 2020-12-16 PROCEDURE — 93308 TTE F-UP OR LMTD: CPT

## 2020-12-16 PROCEDURE — 96360 HYDRATION IV INFUSION INIT: CPT

## 2020-12-16 PROCEDURE — 2580000003 HC RX 258: Performed by: EMERGENCY MEDICINE

## 2020-12-16 PROCEDURE — 2500000003 HC RX 250 WO HCPCS: Performed by: EMERGENCY MEDICINE

## 2020-12-16 RX ORDER — SODIUM CHLORIDE 0.9 % (FLUSH) 0.9 %
10 SYRINGE (ML) INJECTION EVERY 12 HOURS SCHEDULED
Status: DISCONTINUED | OUTPATIENT
Start: 2020-12-16 | End: 2020-12-18 | Stop reason: HOSPADM

## 2020-12-16 RX ORDER — SODIUM CHLORIDE 0.9 % (FLUSH) 0.9 %
10 SYRINGE (ML) INJECTION PRN
Status: DISCONTINUED | OUTPATIENT
Start: 2020-12-16 | End: 2020-12-18 | Stop reason: HOSPADM

## 2020-12-16 RX ORDER — LORAZEPAM 0.5 MG/1
0.5 TABLET ORAL EVERY 6 HOURS PRN
Status: DISCONTINUED | OUTPATIENT
Start: 2020-12-16 | End: 2020-12-18 | Stop reason: HOSPADM

## 2020-12-16 RX ORDER — ATORVASTATIN CALCIUM 40 MG/1
40 TABLET, FILM COATED ORAL DAILY
Status: DISCONTINUED | OUTPATIENT
Start: 2020-12-16 | End: 2020-12-18 | Stop reason: HOSPADM

## 2020-12-16 RX ORDER — ACETAMINOPHEN 325 MG/1
650 TABLET ORAL EVERY 6 HOURS PRN
Status: DISCONTINUED | OUTPATIENT
Start: 2020-12-16 | End: 2020-12-18 | Stop reason: HOSPADM

## 2020-12-16 RX ORDER — ACETAMINOPHEN 650 MG/1
650 SUPPOSITORY RECTAL EVERY 6 HOURS PRN
Status: DISCONTINUED | OUTPATIENT
Start: 2020-12-16 | End: 2020-12-18 | Stop reason: HOSPADM

## 2020-12-16 RX ORDER — POLYETHYLENE GLYCOL 3350 17 G/17G
17 POWDER, FOR SOLUTION ORAL DAILY PRN
Status: DISCONTINUED | OUTPATIENT
Start: 2020-12-16 | End: 2020-12-18 | Stop reason: HOSPADM

## 2020-12-16 RX ORDER — TAMSULOSIN HYDROCHLORIDE 0.4 MG/1
0.8 CAPSULE ORAL DAILY
COMMUNITY
End: 2021-10-17

## 2020-12-16 RX ORDER — M-VIT,TX,IRON,MINS/CALC/FOLIC 27MG-0.4MG
1 TABLET ORAL DAILY
Status: DISCONTINUED | OUTPATIENT
Start: 2020-12-16 | End: 2020-12-18 | Stop reason: HOSPADM

## 2020-12-16 RX ORDER — MIDODRINE HYDROCHLORIDE 5 MG/1
10 TABLET ORAL
Status: DISCONTINUED | OUTPATIENT
Start: 2020-12-16 | End: 2020-12-18 | Stop reason: HOSPADM

## 2020-12-16 RX ORDER — ONDANSETRON 2 MG/ML
4 INJECTION INTRAMUSCULAR; INTRAVENOUS EVERY 6 HOURS PRN
Status: DISCONTINUED | OUTPATIENT
Start: 2020-12-16 | End: 2020-12-18 | Stop reason: HOSPADM

## 2020-12-16 RX ORDER — PROMETHAZINE HYDROCHLORIDE 25 MG/1
12.5 TABLET ORAL EVERY 6 HOURS PRN
Status: DISCONTINUED | OUTPATIENT
Start: 2020-12-16 | End: 2020-12-18 | Stop reason: HOSPADM

## 2020-12-16 RX ORDER — SODIUM CHLORIDE 9 MG/ML
INJECTION, SOLUTION INTRAVENOUS CONTINUOUS
Status: DISCONTINUED | OUTPATIENT
Start: 2020-12-16 | End: 2020-12-17

## 2020-12-16 RX ORDER — FUROSEMIDE 20 MG/1
20 TABLET ORAL DAILY
Status: DISCONTINUED | OUTPATIENT
Start: 2020-12-16 | End: 2020-12-18

## 2020-12-16 RX ORDER — FUROSEMIDE 20 MG/1
20 TABLET ORAL DAILY
Status: ON HOLD | COMMUNITY
End: 2021-10-19 | Stop reason: HOSPADM

## 2020-12-16 RX ORDER — AMIODARONE HYDROCHLORIDE 200 MG/1
200 TABLET ORAL DAILY
Status: DISCONTINUED | OUTPATIENT
Start: 2020-12-16 | End: 2020-12-18 | Stop reason: HOSPADM

## 2020-12-16 RX ORDER — METOPROLOL TARTRATE 50 MG/1
25 TABLET, FILM COATED ORAL 2 TIMES DAILY
Status: DISCONTINUED | OUTPATIENT
Start: 2020-12-16 | End: 2020-12-16

## 2020-12-16 RX ORDER — MIDODRINE HYDROCHLORIDE 5 MG/1
5 TABLET ORAL 3 TIMES DAILY
COMMUNITY
End: 2021-10-17

## 2020-12-16 RX ORDER — TAMSULOSIN HYDROCHLORIDE 0.4 MG/1
0.8 CAPSULE ORAL DAILY
Status: DISCONTINUED | OUTPATIENT
Start: 2020-12-16 | End: 2020-12-18 | Stop reason: HOSPADM

## 2020-12-16 RX ORDER — ASPIRIN 81 MG/1
81 TABLET ORAL DAILY
Status: DISCONTINUED | OUTPATIENT
Start: 2020-12-16 | End: 2020-12-18 | Stop reason: HOSPADM

## 2020-12-16 RX ORDER — METOPROLOL TARTRATE 50 MG/1
50 TABLET, FILM COATED ORAL 2 TIMES DAILY
Status: DISCONTINUED | OUTPATIENT
Start: 2020-12-16 | End: 2020-12-18

## 2020-12-16 RX ADMIN — MULTIPLE VITAMINS W/ MINERALS TAB 1 TABLET: TAB at 15:20

## 2020-12-16 RX ADMIN — DEXTROSE MONOHYDRATE 5 MG/HR: 50 INJECTION, SOLUTION INTRAVENOUS at 01:59

## 2020-12-16 RX ADMIN — TAMSULOSIN HYDROCHLORIDE 0.8 MG: 0.4 CAPSULE ORAL at 15:20

## 2020-12-16 RX ADMIN — METOPROLOL TARTRATE 50 MG: 50 TABLET, FILM COATED ORAL at 21:39

## 2020-12-16 RX ADMIN — APIXABAN 5 MG: 5 TABLET, FILM COATED ORAL at 21:38

## 2020-12-16 RX ADMIN — SODIUM CHLORIDE: 9 INJECTION, SOLUTION INTRAVENOUS at 17:45

## 2020-12-16 RX ADMIN — SODIUM CHLORIDE, PRESERVATIVE FREE 10 ML: 5 INJECTION INTRAVENOUS at 21:39

## 2020-12-16 RX ADMIN — SODIUM CHLORIDE 500 ML: 9 INJECTION, SOLUTION INTRAVENOUS at 00:01

## 2020-12-16 RX ADMIN — ASPIRIN 81 MG: 81 TABLET, COATED ORAL at 15:20

## 2020-12-16 RX ADMIN — AMIODARONE HYDROCHLORIDE 200 MG: 200 TABLET ORAL at 15:21

## 2020-12-16 RX ADMIN — FUROSEMIDE 20 MG: 20 TABLET ORAL at 15:20

## 2020-12-16 RX ADMIN — SODIUM CHLORIDE, PRESERVATIVE FREE 10 ML: 5 INJECTION INTRAVENOUS at 15:23

## 2020-12-16 RX ADMIN — METOPROLOL TARTRATE 25 MG: 50 TABLET, FILM COATED ORAL at 15:19

## 2020-12-16 RX ADMIN — MIDODRINE HYDROCHLORIDE 10 MG: 5 TABLET ORAL at 17:45

## 2020-12-16 RX ADMIN — ATORVASTATIN CALCIUM 40 MG: 40 TABLET, FILM COATED ORAL at 15:20

## 2020-12-16 RX ADMIN — ENOXAPARIN SODIUM 80 MG: 80 INJECTION SUBCUTANEOUS at 15:21

## 2020-12-16 ASSESSMENT — PAIN SCALES - GENERAL: PAINLEVEL_OUTOF10: 0

## 2020-12-16 NOTE — ED NOTES
Granddaughter Gita Ramsay called for an update.   Her number is [phone]     Lukas Toney RN  12/15/20 2953

## 2020-12-16 NOTE — ED NOTES
Pt Heart Rate noted to go back up to 125 bpm. diltiazem restarted.      Manjula Fernandez RN  12/16/20 0152

## 2020-12-16 NOTE — CONSULTS
Will dictate full note   On cardizem drip     [id number]  Possible MICHAEL and CV in am  afib with RVR he may need a pacer  Thanks     Check mri of head  Check cta of head and neck

## 2020-12-16 NOTE — H&P
History and Physical      Name:  Leatha Cordoba /Age/Sex: 1940  (68 y.o. male)   MRN & CSN:  [id number] & [id number] Admission Date/Time: 12/15/2020 10:12 PM   Location:  ED18/ED-18 PCP: Tu Marquez MD       Hospital Day: 2    Assessment and Plan:   Leatha Cordoba is a 68 y.o.  male  who presents with Atrial fibrillation with rapid ventricular response (HCC)    Paroxysmal Atrial Fibrillation:   · HR on admission 123, TSH normal  · Started on Cardizem infusion. · Anticoagulation with apixaban   · cardiology on consult    Hypertension: Blood pressure controlled. Continue medications. Hyperlipidemia: continue statin    Coronary Artery Disease:    Continue ASA, BB, and Statin     BPH: on Flomax    Diet No diet orders on file   DVT Prophylaxis [x] Lovenox, []  Heparin, [] SCDs, [] Warfarin  [] NOAC     GI Prophylaxis [] PPI,  [] H2 Blocker,  [] Carafate,  [x] Diet/Tube Feeds   Code Status Prior   MDM [] Low, [] Moderate,[x]  High     History of Present Illness:     Chief Complaint: Atrial fibrillation with rapid ventricular response ([address]  Leatha Cordoab is a 68 y.o.  male, with past medical history significant for atrial fibrillation status post cardioversion, hypertension, coronary artery disease who presented to the ED from home due palpitations. The present condition started 2 hours prior to admission sinus palpitation associated with shortness of breath. He denied chest pain, fever, cough. He denied changes in his medications. He has been in a lot of stress recently. The patient was brought to the ED and was subsequently admitted. Ten point ROS reviewed negative, unless as noted above    Objective:   No intake or output data in the 24 hours ending 20 0147   Vitals:   Vitals:    20 0102   BP: 125/89   Pulse: 114   Resp:    Temp:    SpO2: 96%     Physical Exam:   GEN Awake male, not in respiratory distress  EYES Pupils are equally round.   No scleral erythema, discharge, or conjunctivitis. HENT Mucous membranes are moist. Oral pharynx without exudates, no evidence of thrush. NECK Supple, no apparent thyromegaly or masses. RESP Clear to auscultation, no wheezes, rales or rhonchi. Symmetric chest movement while on room air. CARDIO/VASC irregularly irregular  GI Abdomen is soft, no tenderness, masses, or guarding. Bowel sounds present. MSK No gross joint deformities. SKIN Normal coloration, warm, dry. NEURO Cranial nerves appear grossly intact, normal speech, no lateralizing weakness. PSYCH Awake, alert, oriented x 4. Past Medical History:      Past Medical History:   Diagnosis Date    Arthritis     CAD (coronary artery disease)     Hyperlipidemia     Hypertension      PSHX:  has a past surgical history that includes Percutaneous Transluminal Coronary Angio; hernia repair; cyst removal; and Colonoscopy (2/4/13). Allergies: No Known Allergies    FAM HX: family history is not on file.   Soc HX:   Social History     Socioeconomic History    Marital status:      Spouse name: None    Number of children: None    Years of education: None    Highest education level: None   Occupational History    None   Social Needs    Financial resource strain: None    Food insecurity     Worry: None     Inability: None    Transportation needs     Medical: None     Non-medical: None   Tobacco Use    Smoking status: Never Smoker    Smokeless tobacco: Never Used   Substance and Sexual Activity    Alcohol use: Yes     Comment: occas    Drug use: No    Sexual activity: None   Lifestyle    Physical activity     Days per week: None     Minutes per session: None    Stress: None   Relationships    Social connections     Talks on phone: None     Gets together: None     Attends Christianity service: None     Active member of club or organization: None     Attends meetings of clubs or organizations: None     Relationship status: None    Intimate partner violence     Fear of current or ex partner: None     Emotionally abused: None     Physically abused: None     Forced sexual activity: None   Other Topics Concern    None   Social History Narrative    None       Medications:     Home Medication   Prior to Admission medications    Medication Sig Start Date End Date Taking? Authorizing Provider   furosemide (LASIX) 20 MG tablet Take 20 mg by mouth daily   Yes Historical Provider, MD   midodrine (PROAMATINE) 5 MG tablet Take 10 mg by mouth 3 times daily   Yes Historical Provider, MD   tamsulosin (FLOMAX) 0.4 MG capsule Take 0.8 mg by mouth daily   Yes Historical Provider, MD   metoprolol tartrate (LOPRESSOR) 25 MG tablet Take 25 mg by mouth 2 times daily   Yes Historical Provider, MD   amiodarone (CORDARONE) 200 MG tablet Take 1 tablet by mouth daily 3/30/20  Yes Khushi Corbin MD   atorvastatin (LIPITOR) 40 MG tablet Take 40 mg by mouth daily    Historical Provider, MD   naproxen (NAPROSYN) 500 MG tablet Take 1 tablet by mouth 2 times daily as needed for Pain 8/31/20 9/10/20  LORNA Ventura   albuterol sulfate  (90 Base) MCG/ACT inhaler Inhale 2 puffs into the lungs every 6 hours as needed for Wheezing 4/27/20   Rafael Patel MD   Multiple Vitamins-Minerals (THERAPEUTIC MULTIVITAMIN-MINERALS) tablet Take 1 tablet by mouth daily    Historical Provider, MD   aspirin EC 81 MG EC tablet Take 1 tablet by mouth daily 2/26/20   Khushi Corbin MD     Medications:    sodium chloride  500 mL Intravenous Once      Infusions:    diltiazem (CARDIZEM) 100 mg in dextrose 5% 100 mL ([place])       PRN Meds:      Recent Labs     12/15/20  2117   WBC 6.3   HGB 12.6*   HCT 39.0*         Recent Labs     12/15/20  2117      K 3.5      CO2 25   BUN 19   CREATININE 1.2     Recent Labs     12/15/20  2117   AST 22   ALT 19   BILIDIR 0.2   BILITOT 0.2   ALKPHOS 86     No results for input(s): INR in the last 72 hours.   Recent Labs     12/15/20  2117   TROPONINT <0.010

## 2020-12-16 NOTE — ED NOTES
Bed: ED-18  Expected date:   Expected time:   Means of arrival:   Comments:  Pt still in room       Amber Raymundoer, [facility]  12/16/20 0956

## 2020-12-16 NOTE — FLOWSHEET NOTE
12/16/20 1610   Vitals   Orthostatic B/P and Pulse?  Yes   Blood Pressure Lying 120/82   Pulse Lying 82 PER MINUTE   Blood Pressure Sitting 109/87   Pulse Sitting 90 PER MINUTE   Blood Pressure Standing 106/78   Pulse Standing 95 PER MINUTE

## 2020-12-16 NOTE — PROGRESS NOTES
Patient seen and examined in ED- P/W with [place] with RVR- on cardizem drip on which it appears controlled. Asking to see his cardiologist - consult placed.  Awaiting for bed on the floor

## 2020-12-16 NOTE — ED NOTES
Heart Rate noted to be in the 90s, will occasionally go to 105 bpm. Diltiazem stopped at this time     Nupur Castro RN  12/16/20 4365

## 2020-12-16 NOTE — ED PROVIDER NOTES
[address] ENCOUNTER      Pt Name: Rody Darling  MRN: [id number]  [place] 1940  Date of evaluation: 12/15/2020  Provider: Fidelina Grimm MD    [address] [place] [address]     Chief Complaint   Patient presents with    Shortness of Breath    Atrial Fibrillation         HISTORY OF PRESENT ILLNESS   (Location/Symptom, Timing/Onset, Context/Setting,Quality, Duration, Modifying Factors, Severity)  Note limiting factors. Rody Darling is a68 y.o. male with history of paroxysmal atrial fibrillation, coronary artery disease, hyperlipidemia and hypertension who presents viaEMS to the emergency department. Pt reports around 9 PM he started to feel his heart rate racing irregularly. Patient reports he put on a home pulse oximeter and noted his heart rate in the 130s. No chest pain. He does have mild shortness of breath. Shortness of breath started along with rapid heart rate. Shortness of breath does worsen with activity. No fever, chills, headache, abdominal pain, nausea, vomiting or diarrhea. No pain with urination no increase in urgency or frequency of urination. Nursing Notes were reviewed.     REVIEW OF SYSTEMS    (2-9 systems for level 4, 10 or more for level 5)     General:  No fevers, no weakness  Eyes:  no discharge  ENT:  No sore throat, no nasal congestion  Cardiovascular:  No chest pain, + palpitations  Respiratory:  + shortness of breath, no cough, no wheezing  Gastrointestinal:  No pain, no nausea, no vomiting, no diarrhea  Musculoskeletal:  No muscle pain, no joint pain  Skin:  No rash, no pruritis  Neurologic:  No speech problems, no headache, no extremity numbness, no extremity tingling, no extremity weakness  Psychiatric:  No anxiety  Genitourinary:  No dysuria, no hematuria  Endocrine:  No unexpected weight gain, no unexpected weight loss  Extremities:  no edema, no pain    Except as noted above the remainder of the review of systems was reviewed and negative. PAST MEDICAL HISTORY     Past Medical History:   Diagnosis Date    Arthritis     CAD (coronary artery disease)     Hyperlipidemia     Hypertension          SURGICALHISTORY       Past Surgical History:   Procedure Laterality Date    COLONOSCOPY  2/4/13    Diverticulosis    CYST REMOVAL      baker cyst on right knee    HERNIA REPAIR      PTCA           CURRENT MEDICATIONS       Previous Medications    ALBUTEROL SULFATE  (90 BASE) MCG/ACT INHALER    Inhale 2 puffs into the lungs every 6 hours as needed for Wheezing    AMIODARONE (CORDARONE) 200 MG TABLET    Take 1 tablet by mouth daily    ASPIRIN EC 81 MG EC TABLET    Take 1 tablet by mouth daily    ATORVASTATIN (LIPITOR) 40 MG TABLET    Take 40 mg by mouth daily    FUROSEMIDE (LASIX) 20 MG TABLET    Take 20 mg by mouth daily    METOPROLOL TARTRATE (LOPRESSOR) 25 MG TABLET    Take 25 mg by mouth 2 times daily    MIDODRINE (PROAMATINE) 5 MG TABLET    Take 10 mg by mouth 3 times daily    MULTIPLE VITAMINS-MINERALS (THERAPEUTIC MULTIVITAMIN-MINERALS) TABLET    Take 1 tablet by mouth daily    NAPROXEN (NAPROSYN) 500 MG TABLET    Take 1 tablet by mouth 2 times daily as needed for Pain    TAMSULOSIN (FLOMAX) 0.4 MG CAPSULE    Take 0.8 mg by mouth daily            Patient has no known allergies. FAMILY HISTORY     History reviewed. No pertinent family history. SOCIAL HISTORY       Social History     Socioeconomic History    Marital status:      Spouse name: None    Number of children: None    Years of education: None    Highest education level: None   Occupational History    None   Social Needs    Financial resource strain: None    Food insecurity     Worry: None     Inability: None    Transportation needs     Medical: None     Non-medical: None   Tobacco Use    Smoking status: Never Smoker    Smokeless tobacco: Never Used   Substance and Sexual Activity    Alcohol use:  Yes Comment: occas    Drug use: No    Sexual activity: None   Lifestyle    Physical activity     Days per week: None     Minutes per session: None    Stress: None   Relationships    Social connections     Talks on phone: None     Gets together: None     Attends Scientology service: None     Active member of club or organization: None     Attends meetings of clubs or organizations: None     Relationship status: None    Intimate partner violence     Fear of current or ex partner: None     Emotionally abused: None     Physically abused: None     Forced sexual activity: None   Other Topics Concern    None   Social History Narrative    None       SCREENINGS    Vanderbilt Coma Scale  Eye Opening: Spontaneous  Best Verbal Response: Oriented  Best Motor Response: Obeys commands  Marian Coma Scale Score: 15        PHYSICAL EXAM    (up to 7 for level 4, 8 or more for level 5)     ED Triage Vitals [12/15/20 2213]   BP Temp Temp Source Pulse Resp SpO2 Height Weight   108/83 97.3 °F (36.3 °C) Oral 142 20 98 % 5' 10\" (1.778 m) 170 lb (77.1 kg)       General appearance:  No acute distress. Skin:  Warm. Dry. Eye:  Extraocular movements intact. Ears, nose, mouth and throat:  Oral mucosa moist   Neck:  Trachea midline. Extremity:  No swelling. Normal ROM     Heart: Tachycardic, irregularly irregular, normal S1 & S2, no extra heart sounds. Perfusion:  intact  Respiratory:  Lungs clear to auscultation bilaterally. Respirations nonlabored. Abdominal:  Normal bowel sounds. Soft. Nontender. Non distended. Neurological:  Alert and oriented times 3.              Psychiatric:  Appropriate      DIAGNOSTIC RESULTS     EKG: All EKG's are interpreted by the Emergency Department Physician who either signs or Co-signs this chart in the absence of a cardiologist.      RADIOLOGY:   Plain film images such as CT, Ultrasound and MRI are read by the radiologist. Plain radiographic images are visualized and preliminarily patient; he was agreeable to admission. Hospitalist was contacted for admission. Patient was admitted in fair condition with stable vitals. CONSULTS:  IP CONSULT TO HOSPITALIST  IP CONSULT TO CARDIOLOGY    Due to the critical nature of this patients presentation, which necessitated multiple bedside assessments, manipulation and supportive measures to prevent further life threatening deterioration, I would like to bill for a total of 30 minutes of critical care time. This was exclusive of any separately billable procedures. FINAL IMPRESSION      1. Atrial fibrillation with rapid ventricular response [facility])          DISPOSITION/PLAN   DISPOSITION Admitted 12/16/2020 01:36:18 AM        (Please note that portions of this note were completed with a voice recognition program.  Efforts were made to edit the dictations but occasionally words are mis-transcribed. )      Diane Fajardo MD (electronically signed)  Attending Emergency Physician         Diane Fajardo MD  12/16/20 6813

## 2020-12-16 NOTE — ED NOTES
Report given to [facility].       Genna Webb RN  12/16/20 7878

## 2020-12-16 NOTE — ED NOTES
Meal tray ordered for pt; also called down to pharmacy to have them send the amiodarone as well as the lipitor to give pt for his daily medications       Amber Curtis, [address] [place] [address]  12/16/20 3365

## 2020-12-16 NOTE — ED NOTES
Report called to Jocelyne MOORE at this time.  Patient to go to room 1140 [facility], RN  12/16/20 9057

## 2020-12-17 ENCOUNTER — APPOINTMENT (OUTPATIENT)
Dept: CT IMAGING | Age: 80
DRG: 310 | End: 2020-12-17
Payer: MEDICARE

## 2020-12-17 LAB
ALBUMIN SERPL-MCNC: 3 GM/DL (ref 3.4–5)
ALP BLD-CCNC: 80 IU/L (ref 40–129)
ALT SERPL-CCNC: 15 U/L (ref 10–40)
ANION GAP SERPL CALCULATED.3IONS-SCNC: 7 MMOL/L (ref 4–16)
APTT: 28.4 SECONDS (ref 25.1–37.1)
AST SERPL-CCNC: 17 IU/L (ref 15–37)
BASOPHILS ABSOLUTE: 0 K/CU MM
BASOPHILS RELATIVE PERCENT: 0.4 % (ref 0–1)
BILIRUB SERPL-MCNC: 0.4 MG/DL (ref 0–1)
BUN BLDV-MCNC: 12 MG/DL (ref 6–23)
CALCIUM SERPL-MCNC: 8 MG/DL (ref 8.3–10.6)
CHLORIDE BLD-SCNC: 110 MMOL/L (ref 99–110)
CO2: 23 MMOL/L (ref 21–32)
CREAT SERPL-MCNC: 1.2 MG/DL (ref 0.9–1.3)
DIFFERENTIAL TYPE: ABNORMAL
EOSINOPHILS ABSOLUTE: 0.2 K/CU MM
EOSINOPHILS RELATIVE PERCENT: 3.4 % (ref 0–3)
GFR AFRICAN AMERICAN: >60 ML/MIN/1.73M2
GFR NON-AFRICAN AMERICAN: 59 ML/MIN/1.73M2
GLUCOSE BLD-MCNC: 99 MG/DL (ref 70–99)
HCT VFR BLD CALC: 39.3 % (ref 42–52)
HEMOGLOBIN: 12.5 GM/DL (ref 13.5–18)
IMMATURE NEUTROPHIL %: 0.3 % (ref 0–0.43)
INR BLD: 1.11 INDEX
LV EF: 48 %
LVEF MODALITY: NORMAL
LYMPHOCYTES ABSOLUTE: 1.7 K/CU MM
LYMPHOCYTES RELATIVE PERCENT: 24.8 % (ref 24–44)
MAGNESIUM: 2 MG/DL (ref 1.8–2.4)
MCH RBC QN AUTO: 28.9 PG (ref 27–31)
MCHC RBC AUTO-ENTMCNC: 31.8 % (ref 32–36)
MCV RBC AUTO: 91 FL (ref 78–100)
MONOCYTES ABSOLUTE: 0.8 K/CU MM
MONOCYTES RELATIVE PERCENT: 11.1 % (ref 0–4)
NUCLEATED RBC %: 0 %
PDW BLD-RTO: 12.4 % (ref 11.7–14.9)
PLATELET # BLD: 156 K/CU MM (ref 140–440)
PMV BLD AUTO: 10.5 FL (ref 7.5–11.1)
POTASSIUM SERPL-SCNC: 3.6 MMOL/L (ref 3.5–5.1)
PROTHROMBIN TIME: 13.5 SECONDS (ref 11.7–14.5)
RBC # BLD: 4.32 M/CU MM (ref 4.6–6.2)
SARS-COV-2, NAAT: NOT DETECTED
SEGMENTED NEUTROPHILS ABSOLUTE COUNT: 4.2 K/CU MM
SEGMENTED NEUTROPHILS RELATIVE PERCENT: 60 % (ref 36–66)
SODIUM BLD-SCNC: 140 MMOL/L (ref 135–145)
SOURCE: NORMAL
TOTAL IMMATURE NEUTOROPHIL: 0.02 K/CU MM
TOTAL NUCLEATED RBC: 0 K/CU MM
TOTAL PROTEIN: 5.7 GM/DL (ref 6.4–8.2)
WBC # BLD: 7 K/CU MM (ref 4–10.5)

## 2020-12-17 PROCEDURE — 2580000003 HC RX 258: Performed by: INTERNAL MEDICINE

## 2020-12-17 PROCEDURE — 5A2204Z RESTORATION OF CARDIAC RHYTHM, SINGLE: ICD-10-PCS | Performed by: INTERNAL MEDICINE

## 2020-12-17 PROCEDURE — U0002 COVID-19 LAB TEST NON-CDC: HCPCS

## 2020-12-17 PROCEDURE — 7100000000 HC PACU RECOVERY - FIRST 15 MIN

## 2020-12-17 PROCEDURE — 6370000000 HC RX 637 (ALT 250 FOR IP): Performed by: INTERNAL MEDICINE

## 2020-12-17 PROCEDURE — 70450 CT HEAD/BRAIN W/O DYE: CPT

## 2020-12-17 PROCEDURE — 7100000001 HC PACU RECOVERY - ADDTL 15 MIN

## 2020-12-17 PROCEDURE — 36415 COLL VENOUS BLD VENIPUNCTURE: CPT

## 2020-12-17 PROCEDURE — 93312 ECHO TRANSESOPHAGEAL: CPT

## 2020-12-17 PROCEDURE — 85025 COMPLETE CBC W/AUTO DIFF WBC: CPT

## 2020-12-17 PROCEDURE — 70498 CT ANGIOGRAPHY NECK: CPT

## 2020-12-17 PROCEDURE — 94761 N-INVAS EAR/PLS OXIMETRY MLT: CPT

## 2020-12-17 PROCEDURE — 83735 ASSAY OF MAGNESIUM: CPT

## 2020-12-17 PROCEDURE — 92960 CARDIOVERSION ELECTRIC EXT: CPT

## 2020-12-17 PROCEDURE — 6360000004 HC RX CONTRAST MEDICATION: Performed by: INTERNAL MEDICINE

## 2020-12-17 PROCEDURE — 85610 PROTHROMBIN TIME: CPT

## 2020-12-17 PROCEDURE — 85730 THROMBOPLASTIN TIME PARTIAL: CPT

## 2020-12-17 PROCEDURE — 93005 ELECTROCARDIOGRAM TRACING: CPT | Performed by: INTERNAL MEDICINE

## 2020-12-17 PROCEDURE — 80053 COMPREHEN METABOLIC PANEL: CPT

## 2020-12-17 PROCEDURE — B24BZZ4 ULTRASONOGRAPHY OF HEART WITH AORTA, TRANSESOPHAGEAL: ICD-10-PCS | Performed by: INTERNAL MEDICINE

## 2020-12-17 PROCEDURE — 2500000003 HC RX 250 WO HCPCS: Performed by: INTERNAL MEDICINE

## 2020-12-17 PROCEDURE — 2140000000 HC CCU INTERMEDIATE R&B

## 2020-12-17 RX ORDER — SODIUM CHLORIDE 9 MG/ML
INJECTION, SOLUTION INTRAVENOUS CONTINUOUS
Status: DISCONTINUED | OUTPATIENT
Start: 2020-12-17 | End: 2020-12-18

## 2020-12-17 RX ADMIN — METOPROLOL TARTRATE 50 MG: 50 TABLET, FILM COATED ORAL at 21:50

## 2020-12-17 RX ADMIN — DEXTROSE MONOHYDRATE 5 MG/HR: 50 INJECTION, SOLUTION INTRAVENOUS at 08:28

## 2020-12-17 RX ADMIN — METOPROLOL TARTRATE 50 MG: 50 TABLET, FILM COATED ORAL at 08:20

## 2020-12-17 RX ADMIN — IOPAMIDOL 80 ML: 755 INJECTION, SOLUTION INTRAVENOUS at 12:34

## 2020-12-17 RX ADMIN — MIDODRINE HYDROCHLORIDE 10 MG: 5 TABLET ORAL at 17:08

## 2020-12-17 RX ADMIN — SODIUM CHLORIDE: 9 INJECTION, SOLUTION INTRAVENOUS at 15:44

## 2020-12-17 RX ADMIN — SODIUM CHLORIDE, PRESERVATIVE FREE 10 ML: 5 INJECTION INTRAVENOUS at 21:50

## 2020-12-17 RX ADMIN — AMIODARONE HYDROCHLORIDE 200 MG: 200 TABLET ORAL at 08:20

## 2020-12-17 RX ADMIN — APIXABAN 5 MG: 5 TABLET, FILM COATED ORAL at 21:50

## 2020-12-17 RX ADMIN — MIDODRINE HYDROCHLORIDE 10 MG: 5 TABLET ORAL at 08:21

## 2020-12-17 RX ADMIN — APIXABAN 5 MG: 5 TABLET, FILM COATED ORAL at 08:20

## 2020-12-17 RX ADMIN — SODIUM CHLORIDE: 9 INJECTION, SOLUTION INTRAVENOUS at 02:49

## 2020-12-17 ASSESSMENT — PAIN SCALES - GENERAL
PAINLEVEL_OUTOF10: 0

## 2020-12-17 NOTE — PROCEDURES
[address], [address] [place] [address]                                 ECHOCARDIOGRAM    PATIENT NAME: Mohan Solorio                     :        1940  MED REC NO:   [id number]                          ROOM:       8245  ACCOUNT NO:   [de-identified]                           ADMIT DATE: 12/15/2020  PROVIDER:     Brodie Wong MD    DATE OF STUDY:  2020    PROCEDURE:  MICHAEL-guided cardioversion. INDICATION:  Atrial fibrillation. TECHNIQUE:  This is a 65-year-old male patient, brought to noninvasive  lab. Informed consent was obtained from the patient. The patient  received 5 mg of Versed and 50 mcg of fentanyl. The posterior pharynx  was anesthetized using lidocaine viscous gel. A mouthguard was placed. MICHAEL probe was advanced to the posterior pharynx and mid esophagus. Images were obtained. FINDINGS:  Left atrium is moderately enlarged, but no clot or thrombus  noted. Mitral valve is normal, mild-to-moderate mitral regurgitation  noted. LV function is preserved. EF is around 50% to 55%. No  pericardial effusion noted. No ASD or PFO was noted. Tricuspid valve  is normal.  Pulmonic valve is normal.  Aortic valve is normal.   Ascending aorta is normal.  Descending aorta is normal.    IMPRESSION:  1. No clot or thrombus noted in the left atrium, left atrial appendage,  or LV cavity. 2.  LV function is preserved at 50% to 55%. 3.  Mild-to-moderate mitral regurgitation noted. MICHAEL probe was removed with no complications. The patient was proceeded with the cardioversion. The patient was  successfully cardioverted from atrial fibrillation to sinus rhythm with  one shock of 200 joules. IMPRESSION:  Successful cardioversion from atrial fibrillation to sinus  rhythm. I recommend keeping the patient on amiodarone, anticoagulation,  Lopressor 50 mg b.i.d., and continue his ProAmatine also. Arun Hernández MD    D: 12/17/2020 14:06:04       T: 12/17/2020 14:25:22     [id number]  Job#: [id number]     Doc#: [id number]    CC:

## 2020-12-17 NOTE — PLAN OF CARE
Problem: Daily Care:  Goal: Daily care needs are met  Description: Daily care needs are met  Outcome: Ongoing     Problem: Pain:  Goal: Patient's pain/discomfort is manageable  Description: Patient's pain/discomfort is manageable  Outcome: Ongoing     Problem: Cardiac:  Goal: Ability to maintain vital signs within normal range will improve  Description: Ability to maintain vital signs within normal range will improve  Outcome: Ongoing  Goal: Cardiovascular alteration will improve  Description: Cardiovascular alteration will improve  Outcome: Ongoing

## 2020-12-17 NOTE — PROGRESS NOTES
Daily Progress Note    Remain in afib rate in 100  He has been non complaint with meds  Plan for MICHAEL and Cv and AC  He has dizziness not sure etiology  Labs stable   CTA negative head and neck   He may need a pacer in future  He may have SSS-  Placed on cardizem for now due to rapid rate   EF 40-50% range    Pt. Awake, alert and feeling ok  HR stable, AF in the 90s, BP stable  No CP, SOB today, some dizziness    AF with RVR    Hx of PAF in the past    On cardizem drVERO Jaramillo, AC    Planning for MICHAEL/DCCV today    Keep AC    CTA head and neck and MRI ordered- compalining of dizziness and LOYOLA  Will cont. To follow  Further recs after testing done    Echo-12/16/20  Summary   This is a limited echocardiogram.   Left ventricular systolic function is abnormal.   Ejection fraction is visually estimated at 40-50%. Sigmoid shaped septum with mild septal hypertrophy. Moderate mitral regurgitation. No evidence of any pericardial effusion.        PAST MEDICAL HISTORY:  Atrial fibrillation paroxysmal, he has been under  rate control and on anticoagulation, hypertension, hyperlipidemia, and  history of coronary artery disease status post angioplasty done in 2007,  recent heart catheterization in 2020, was found to have patent stents  noted.     PAST SURGICAL HISTORY:  Baker's cyst in the right knee, hernia repair,  and angioplasty.     SOCIAL HISTORY:  He does not smoke, does not drink.     ALLERGIES:  NKDA.     MEDICATIONS:  Last time on discharge, he was on amiodarone 200 mg once a  day, he was on Eliquis 5 mg b.i.d., and Toprol 50 mg b.i.d.      Objective:   BP 99/68   Pulse 104   Temp 98 °F (36.7 °C) (Oral)   Resp 20   Ht 5' 10\" (1.778 m)   Wt 170 lb 9 oz (77.4 kg)   SpO2 96%   BMI 24.47 kg/m²       Intake/Output Summary (Last 24 hours) at 12/17/2020 1040  Last data filed at 12/17/2020 0604  Gross per 24 hour   Intake 10 ml   Output 400 ml   Net -390 ml       Medications:   Scheduled Meds:   sodium chloride flush  10 mL Intravenous 2 times per day    amiodarone  200 mg Oral Daily    aspirin EC  81 mg Oral Daily    atorvastatin  40 mg Oral Daily    [Held by provider] furosemide  20 mg Oral Daily    midodrine  10 mg Oral TID WC    therapeutic multivitamin-minerals  1 tablet Oral Daily    tamsulosin  0.8 mg Oral Daily    apixaban  5 mg Oral BID    metoprolol tartrate  50 mg Oral BID      Infusions:   diltiazem (CARDIZEM) 100 mg in dextrose 5% 100 mL ([facility]) 10 mg/hr (12/17/20 0925)    sodium chloride 100 mL/hr at 12/17/20 0249      PRN Meds:  sodium chloride flush, promethazine **OR** ondansetron, polyethylene glycol, acetaminophen **OR** acetaminophen, LORazepam       Physical Exam:  Vitals:    12/17/20 0830   BP:    Pulse:    Resp: 20   Temp: 98 °F (36.7 °C)   SpO2: 96%        General: AAO, NAD  Chest: Nontender  Cardiac: First and Second Heart Sounds are Normal, No Murmurs or Gallops noted  Lungs:Clear to auscultation and percussion. Abdomen: Soft, NT, ND, +BS  Extremities: No clubbing, no edema  Vascular:  Equal 2+ peripheral pulses. Lab Data:  CBC:   Recent Labs     12/15/20  2117 12/17/20  0805   WBC 6.3 7.0   HGB 12.6* 12.5*   HCT 39.0* 39.3*   MCV 91.3 91.0    156     BMP:   Recent Labs     12/15/20  2117 12/17/20  0754    140   K 3.5 3.6    110   CO2 25 23   BUN 19 12   CREATININE 1.2 1.2     LIVER PROFILE:   Recent Labs     12/15/20  2117 12/17/20  0754   AST 22 17   ALT 19 15   LIPASE 29  --    BILIDIR 0.2  --    BILITOT 0.2 0.4   ALKPHOS 86 80     PT/INR:   Recent Labs     12/17/20  0754   PROTIME 13.5   INR 1.11     APTT: No results for input(s): APTT in the last 72 hours. BNP:  No results for input(s): BNP in the last 72 hours.       Assessment:  Patient Active Problem List    Diagnosis Date Noted    Atrial fibrillation with rapid ventricular response ([facility] [address] 12/16/2020    LUCILLE on CPAP 10/13/2020    A-fib ([facility] [address] 03/27/2020       Electronically signed by Yaneli Jolley

## 2020-12-17 NOTE — CARE COORDINATION
.CM met with pt for d/c planning. Introduced self and explained reason for visit. Pt lives with spouse and is independent with ADL's. Pt drives, has a PCP, has insurance, and is able to afford his medication. Pt denies having any DME or services. [facility] offered and pt refused. Pt denies any needs at this time. D/c plan is home with spouse, no needs. Notify CM if any d/c needs arise.   TE

## 2020-12-17 NOTE — PROGRESS NOTES
Hospitalist Progress Note      Name:  Ever Fenton /Age/Sex: 1940  (68 y.o. male)   MRN & CSN:  [id number] & [id number] Admission Date/Time: 12/15/2020 10:12 PM   Location:  [address] [place] [address] PCP: Deanna August MD         Hospital Day: 3    Assessment and Plan:   Ever Fenton is a 68 y.o.  male  who presents with Atrial fibrillation with rapid ventricular response (HCC)    1) Paroxysmal Atrial Fibrillation with RVR  -Admitted with HR in 120's  -Started on IV cardizem drip  -Continue Eliquis  -Cardiology on board; possible MICHAEL DCV      2) Dizziness  -Might be due to uncontrolled A fib  -CTA head and neck: Unremarkable  -Monitor closely     3) Essential Hypertension  -Controlled    4) Hyperlipidemia: continue statin     5) Coronary Artery Disease:   -Continue ASA, BB, and Statin      6) BPH: on Flomax       Diet Diet NPO, After Midnight   DVT Prophylaxis [] Lovenox, []  Heparin, [] SCDs, [] Ambulation   GI Prophylaxis [] PPI,  [] H2 Blocker,  [] Carafate,  [] Diet/Tube Feeds   Code Status Full Code   Disposition TBD   MDM      History of Present Illness:     Patient was seen and examined  Denied any worsening dizziness or palpitations  No SOB, fever, chills, N/V/D       Ten point ROS reviewed negative, unless as noted above    Objective: Intake/Output Summary (Last 24 hours) at 2020 1311  Last data filed at 2020 1250  Gross per 24 hour   Intake 10 ml   Output 1000 ml   Net -990 ml      Vitals:   Vitals:    20 0830   BP:    Pulse:    Resp: 20   Temp: 98 °F (36.7 °C)   SpO2: 96%     Physical Exam:   GEN Awake male, sitting upright in bed in no apparent distress. Appears given age. EYES Pupils are equally round. No scleral erythema, discharge, or conjunctivitis. HENT Mucous membranes are moist. Oral pharynx without exudates, no evidence of thrush. NECK Supple, no apparent thyromegaly or masses. RESP Clear to auscultation, no wheezes, rales or rhonchi.   Symmetric chest movement while on room air. CARDIO/VASC S1/S2 auscultated. Regular rate without appreciable murmurs, rubs, or gallops. No JVD or carotid bruits. Peripheral pulses equal bilaterally and palpable. No peripheral edema. GI Abdomen is soft without significant tenderness, masses, or guarding. Bowel sounds are normoactive. Rectal exam deferred.  No costovertebral angle tenderness. Normal appearing external genitalia. Dexter catheter is not present. HEME/LYMPH No palpable cervical lymphadenopathy and no hepatosplenomegaly. No petechiae or ecchymoses. MSK No gross joint deformities. SKIN Normal coloration, warm, dry. NEURO Cranial nerves appear grossly intact, normal speech, no lateralizing weakness. PSYCH Awake, alert, oriented x 4. Affect appropriate.     Medications:   Medications:    sodium chloride flush  10 mL Intravenous 2 times per day    amiodarone  200 mg Oral Daily    aspirin EC  81 mg Oral Daily    atorvastatin  40 mg Oral Daily    [Held by provider] furosemide  20 mg Oral Daily    midodrine  10 mg Oral TID WC    therapeutic multivitamin-minerals  1 tablet Oral Daily    tamsulosin  0.8 mg Oral Daily    apixaban  5 mg Oral BID    metoprolol tartrate  50 mg Oral BID      Infusions:    diltiazem (CARDIZEM) 100 mg in dextrose 5% 100 mL ([facility]) 10 mg/hr (12/17/20 0925)    sodium chloride 100 mL/hr at 12/17/20 0249     PRN Meds:     sodium chloride flush, 10 mL, PRN      promethazine, 12.5 mg, Q6H PRN    Or      ondansetron, 4 mg, Q6H PRN      polyethylene glycol, 17 g, Daily PRN      acetaminophen, 650 mg, Q6H PRN    Or      acetaminophen, 650 mg, Q6H PRN      LORazepam, 0.5 mg, Q6H PRN          Electronically signed by Grady Jennings MD on 12/17/2020 at 1:11 PM

## 2020-12-17 NOTE — CONSULTS
[facility]               [address] [facility], [address] [facility]                                  CONSULTATION    PATIENT NAME: Kendrick Mak                     :        1940  MED REC NO:   [id number]                          ROOM:       5706  ACCOUNT NO:   [de-identified]                           ADMIT DATE: 12/15/2020  PROVIDER:     Jax Snyder MD    CONSULT DATE:  2020    INDICATION:  Atrial fibrillation. HISTORY OF PRESENT ILLNESS:  This is a 60-year-old male patient who came  to the hospital yesterday with multiple complaints present. He was in  AFib with RVR. He has been short of breath also. He said he has been  in AFib since 2020. Denies any fevers and chills. No cough or  sputum production. No other  or GI complaints. No syncopal episode  is present. The patient is on a Cardizem drip. The rate is better  controlled right now. The patient had an echo done back in 2020. EF was around 50% to 55%. The patient was cardioverted at that time. The patient was kept on  amiodarone and Toprol 50 mg b.i.d. at that time. The patient had a  heart catheterization done in 2020 also. It showed left main was  patent, LAD stents were patent, circ had mild disease, RCA had mild  disease. His right heart pressure was also normal.  He had verapamil  with him at that time and verapamil was in the morning and Toprol is at  bedtime. The patient was here back in 2020 with a syncopal episode and he was  in AFib also. He was cardioverted at that time. PAST MEDICAL HISTORY:  Atrial fibrillation paroxysmal, he has been under  rate control and on anticoagulation, hypertension, hyperlipidemia, and  history of coronary artery disease status post angioplasty done in ,  recent heart catheterization in , was found to have patent stents  noted.     PAST SURGICAL HISTORY:  Baker's cyst in the right knee, hernia repair, and angioplasty. SOCIAL HISTORY:  He does not smoke, does not drink. ALLERGIES:  NKDA. MEDICATIONS:  Last time on discharge, he was on amiodarone 200 mg once a  day, he was on Eliquis 5 mg b.i.d., and Toprol 50 mg b.i.d. PHYSICAL EXAMINATION:  GENERAL:  The patient is awake, alert, and answering questions, not in  acute distress. VITAL SIGNS:  Temperature is afebrile, pulse is 88, blood pressure  119/80, atrial fibrillation present. HEENT:  Head is normocephalic and atraumatic. Pupils are equal and  reactive to light. CHEST:  Equal expansion. HEART:  Irregularly irregular. ABDOMEN:  Soft and nontender. Bowel sounds are present. No  hepatosplenomegaly or guarding appreciated. EXTREMITIES:  No cyanosis or clubbing noted. NEUROLOGIC:  Cranial nerves II through XII are grossly intact. LABORATORY DATA:  His BUN is 19, creatinine 1.2. BNP is 276. Troponins  are negative. LFTs are normal.  TSH is normal.  CBC is within normal  range. IMPRESSION:  This is a 70-year-old male patient who came to the hospital  with having AFib present. His medications were changed recently as an  outpatient because of his dizziness and lightheadedness present. He was  on ProAmatine 10 mg t.i.d. and Lopressor 25 mg b.i.d. and amiodarone 200  mg a day. I think he was also on aspirin also, as he was having issues  with bruising. At this time, I will go ahead and switch him back on p.o. medications  and we will possibly do MICHAEL cardioversion tomorrow. We will make  further recommendations based on the hospital course.         Ana Paula Hannah MD    D: 12/16/2020 16:12:31       T: 12/16/2020 18:37:53     [email or web address]  Job#: [id number]     Doc#: [id number]    CC:

## 2020-12-18 ENCOUNTER — APPOINTMENT (OUTPATIENT)
Dept: MRI IMAGING | Age: 80
DRG: 310 | End: 2020-12-18
Payer: MEDICARE

## 2020-12-18 VITALS
SYSTOLIC BLOOD PRESSURE: 119 MMHG | BODY MASS INDEX: 24.69 KG/M2 | HEART RATE: 73 BPM | OXYGEN SATURATION: 96 % | DIASTOLIC BLOOD PRESSURE: 81 MMHG | HEIGHT: 70 IN | RESPIRATION RATE: 14 BRPM | TEMPERATURE: 97.9 F | WEIGHT: 172.5 LBS

## 2020-12-18 LAB
EKG ATRIAL RATE: 65 BPM
EKG DIAGNOSIS: NORMAL
EKG P AXIS: 0 DEGREES
EKG P-R INTERVAL: 136 MS
EKG Q-T INTERVAL: 412 MS
EKG QRS DURATION: 88 MS
EKG QTC CALCULATION (BAZETT): 428 MS
EKG R AXIS: -21 DEGREES
EKG T AXIS: -73 DEGREES
EKG VENTRICULAR RATE: 65 BPM

## 2020-12-18 PROCEDURE — 6370000000 HC RX 637 (ALT 250 FOR IP): Performed by: INTERNAL MEDICINE

## 2020-12-18 PROCEDURE — 93010 ELECTROCARDIOGRAM REPORT: CPT | Performed by: INTERNAL MEDICINE

## 2020-12-18 PROCEDURE — 2580000003 HC RX 258: Performed by: INTERNAL MEDICINE

## 2020-12-18 PROCEDURE — 70551 MRI BRAIN STEM W/O DYE: CPT

## 2020-12-18 RX ORDER — METOPROLOL TARTRATE 50 MG/1
50 TABLET, FILM COATED ORAL 3 TIMES DAILY
Qty: 60 TABLET | Refills: 3 | Status: SHIPPED | OUTPATIENT
Start: 2020-12-18 | End: 2020-12-18

## 2020-12-18 RX ORDER — METOPROLOL TARTRATE 50 MG/1
50 TABLET, FILM COATED ORAL 2 TIMES DAILY
Qty: 60 TABLET | Refills: 3 | Status: ON HOLD | OUTPATIENT
Start: 2020-12-18 | End: 2021-01-22 | Stop reason: HOSPADM

## 2020-12-18 RX ORDER — METOPROLOL TARTRATE 50 MG/1
50 TABLET, FILM COATED ORAL 3 TIMES DAILY
Status: DISCONTINUED | OUTPATIENT
Start: 2020-12-18 | End: 2020-12-18 | Stop reason: HOSPADM

## 2020-12-18 RX ADMIN — ATORVASTATIN CALCIUM 40 MG: 40 TABLET, FILM COATED ORAL at 08:57

## 2020-12-18 RX ADMIN — SODIUM CHLORIDE, PRESERVATIVE FREE 10 ML: 5 INJECTION INTRAVENOUS at 08:58

## 2020-12-18 RX ADMIN — ASPIRIN 81 MG: 81 TABLET, COATED ORAL at 08:57

## 2020-12-18 RX ADMIN — TAMSULOSIN HYDROCHLORIDE 0.8 MG: 0.4 CAPSULE ORAL at 08:57

## 2020-12-18 RX ADMIN — METOPROLOL TARTRATE 50 MG: 50 TABLET, FILM COATED ORAL at 09:00

## 2020-12-18 RX ADMIN — APIXABAN 5 MG: 5 TABLET, FILM COATED ORAL at 08:57

## 2020-12-18 RX ADMIN — MIDODRINE HYDROCHLORIDE 10 MG: 5 TABLET ORAL at 08:57

## 2020-12-18 RX ADMIN — MULTIPLE VITAMINS W/ MINERALS TAB 1 TABLET: TAB at 08:57

## 2020-12-18 RX ADMIN — AMIODARONE HYDROCHLORIDE 200 MG: 200 TABLET ORAL at 08:57

## 2020-12-18 ASSESSMENT — PAIN SCALES - GENERAL
PAINLEVEL_OUTOF10: 0
PAINLEVEL_OUTOF10: 0

## 2020-12-18 NOTE — PROGRESS NOTES
[facility] CLINICAL PH[facility]ACY NOTE: MEDS TO [address] Select Patient?: No  Total # of Prescriptions Filled: 1   The following medications were delivered to the patient:   apixaban 5 MG Tabs tablet     metoprolol tartrate 50 MG tablet    Total # of Interventions Completed: 1  Time Spent (min): 15    Additional Documentation:    Metoprolol filled at other pharmacy, put RX on file

## 2020-12-18 NOTE — DISCHARGE SUMMARY
12/15/20  2117 12/17/20  0754 12/17/20  0805    140  --    K 3.5 3.6  --     110  --    CO2 25 23  --    BUN 19 12  --    CREATININE 1.2 1.2  --    WBC 6.3  --  7.0   HCT 39.0*  --  39.3*     --  156       IMAGING:  As above    Discharge Time of 35 minutes    Electronically signed by Zara Espinoza MD on 12/18/2020 at 11:59 AM

## 2020-12-18 NOTE — PROGRESS NOTES
Called RN this morning to try and bring pt to MRI. When asked if pt was alert and oriented and if could travel to MRI RN states she don't know yet because she had not yet received report on that patient at [facility].[place].   Will call back later to see if patient can come down to MRI

## 2020-12-18 NOTE — PROGRESS NOTES
Daily Progress Note  F/p cardioverted  Now sinus  Ok to d/c home  MRI of head negative  CTA of head and neck negative  Keep current meds on d/c  F/u in one week     Pt. Awake, alert and feeling ok  HR stable, NSR in the 80s, BP stable  No CP, SOB today     AF with RVR    Hx of PAF in the past    Off cardizem drip    Amio, BB, AC    S/p MICHAEL/DCCV     CTA head and neck neg. and MRI ordered for today  Increase activity  Will cont. To follow-likely home today pending MRI results  F/u in one week at office    MICHAEL  IMPRESSION:  1. No clot or thrombus noted in the left atrium, left atrial appendage,  or LV cavity. 2.  LV function is preserved at 50% to 55%.   3.  Mild-to-moderate mitral regurgitation noted.     Echo-12/16/20  Summary   This is a limited echocardiogram.   Left ventricular systolic function is abnormal.   Ejection fraction is visually estimated at 40-50%.   Sigmoid shaped septum with mild septal hypertrophy.   Moderate mitral regurgitation.   No evidence of any pericardial effusion.        PAST MEDICAL HISTORY:  Atrial fibrillation paroxysmal, he has been under  rate control and on anticoagulation, hypertension, hyperlipidemia, and  history of coronary artery disease status post angioplasty done in 2007,  recent heart catheterization in 2020, was found to have patent stents  noted.     PAST SURGICAL HISTORY:  Baker's cyst in the right knee, hernia repair,  and angioplasty.     SOCIAL HISTORY:  He does not smoke, does not drink.     ALLERGIES:  NKDA.     MEDICATIONS:  Last time on discharge, he was on amiodarone 200 mg once a  day, he was on Eliquis 5 mg b.i.d., and Toprol 50 mg b.i.d.      Objective:   /73   Pulse 84   Temp 98.6 °F (37 °C) (Oral)   Resp 15   Ht 5' 10\" (1.778 m)   Wt 172 lb 8 oz (78.2 kg)   SpO2 95%   BMI 24.75 kg/m²       Intake/Output Summary (Last 24 hours) at 12/18/2020 1058  Last data filed at 12/18/2020 7580  Gross per 24 hour   Intake 1256 ml   Output 1200 ml   Net 56 ml       Medications:   Scheduled Meds:   metoprolol tartrate  50 mg Oral TID    sodium chloride flush  10 mL Intravenous 2 times per day    amiodarone  200 mg Oral Daily    aspirin EC  81 mg Oral Daily    atorvastatin  40 mg Oral Daily    midodrine  10 mg Oral TID WC    therapeutic multivitamin-minerals  1 tablet Oral Daily    tamsulosin  0.8 mg Oral Daily    apixaban  5 mg Oral BID      Infusions:     PRN Meds:  sodium chloride flush, promethazine **OR** ondansetron, polyethylene glycol, acetaminophen **OR** acetaminophen, LORazepam       Physical Exam:  Vitals:    12/18/20 0900   BP: 116/73   Pulse: 84   Resp: 15   Temp: 98.6 °F (37 °C)   SpO2: 95%        General: AAO, NAD  Chest: Nontender  Cardiac: First and Second Heart Sounds are Normal, No Murmurs or Gallops noted  Lungs:Clear to auscultation and percussion. Abdomen: Soft, NT, ND, +BS  Extremities: No clubbing, no edema  Vascular:  Equal 2+ peripheral pulses. Lab Data:  CBC:   Recent Labs     12/15/20  2117 12/17/20  0805   WBC 6.3 7.0   HGB 12.6* 12.5*   HCT 39.0* 39.3*   MCV 91.3 91.0    156     BMP:   Recent Labs     12/15/20  2117 12/17/20  0754    140   K 3.5 3.6    110   CO2 25 23   BUN 19 12   CREATININE 1.2 1.2     LIVER PROFILE:   Recent Labs     12/15/20  2117 12/17/20  0754   AST 22 17   ALT 19 15   LIPASE 29  --    BILIDIR 0.2  --    BILITOT 0.2 0.4   ALKPHOS 86 80     PT/INR:   Recent Labs     12/17/20  0754   PROTIME 13.5   INR 1.11     APTT:   Recent Labs     12/17/20  0754   APTT 28.4     BNP:  No results for input(s): BNP in the last 72 hours.       Assessment:  Patient Active Problem List    Diagnosis Date Noted    Atrial fibrillation with rapid ventricular response ([facility] [address] 12/16/2020    LUCILLE on CPAP 10/13/2020    A-fib ([facility] [address] 03/27/2020       Electronically signed by Emeterio Leigh PA-C on 12/18/2020 at 10:58 AM

## 2020-12-21 LAB
EKG ATRIAL RATE: 153 BPM
EKG DIAGNOSIS: NORMAL
EKG Q-T INTERVAL: 318 MS
EKG QRS DURATION: 90 MS
EKG QTC CALCULATION (BAZETT): 455 MS
EKG R AXIS: -18 DEGREES
EKG T AXIS: 60 DEGREES
EKG VENTRICULAR RATE: 123 BPM

## 2020-12-30 ENCOUNTER — INITIAL CONSULT (OUTPATIENT)
Dept: CARDIOLOGY CLINIC | Age: 80
End: 2020-12-30
Payer: MEDICARE

## 2020-12-30 VITALS
OXYGEN SATURATION: 98 % | TEMPERATURE: 96.8 F | WEIGHT: 178 LBS | HEART RATE: 63 BPM | BODY MASS INDEX: 25.48 KG/M2 | HEIGHT: 70 IN | DIASTOLIC BLOOD PRESSURE: 82 MMHG | RESPIRATION RATE: 13 BRPM | SYSTOLIC BLOOD PRESSURE: 118 MMHG

## 2020-12-30 PROCEDURE — G8427 DOCREV CUR MEDS BY ELIG CLIN: HCPCS | Performed by: INTERNAL MEDICINE

## 2020-12-30 PROCEDURE — 1111F DSCHRG MED/CURRENT MED MERGE: CPT | Performed by: INTERNAL MEDICINE

## 2020-12-30 PROCEDURE — 93000 ELECTROCARDIOGRAM COMPLETE: CPT | Performed by: INTERNAL MEDICINE

## 2020-12-30 PROCEDURE — 4040F PNEUMOC VAC/ADMIN/RCVD: CPT | Performed by: INTERNAL MEDICINE

## 2020-12-30 PROCEDURE — G8484 FLU IMMUNIZE NO ADMIN: HCPCS | Performed by: INTERNAL MEDICINE

## 2020-12-30 PROCEDURE — 99205 OFFICE O/P NEW HI 60 MIN: CPT | Performed by: INTERNAL MEDICINE

## 2020-12-30 PROCEDURE — G8417 CALC BMI ABV UP PARAM F/U: HCPCS | Performed by: INTERNAL MEDICINE

## 2020-12-30 PROCEDURE — 1036F TOBACCO NON-USER: CPT | Performed by: INTERNAL MEDICINE

## 2020-12-30 PROCEDURE — 1123F ACP DISCUSS/DSCN MKR DOCD: CPT | Performed by: INTERNAL MEDICINE

## 2020-12-30 NOTE — PROGRESS NOTES
[facility] Consult Note      Reason for consultation:  Atrial fibrillation and orthostatic hypotension    Chief complaint :  Shortness of breath on exertion, Dizziness    Referring physician: Arnoldo Ortega      Primary care physician: Lemuel Castellon MD      History of Present Illness:       Patient is a 74yr old male with hx of PAF, LUCILLE on CPAP, HTN, HLD, CAD referred by  for atrial fibrillation management and syncope management. Patient report shortness of breath with exertion and this has been ongoing for several months now. Patient reported that he also has dizziness when going from sitting to standing position. This has been going on for some time too. He is on medication but still has. Recently he was stopped of norvasc. Patient denies chest pain. Patient does have palpitations. He was started on amiodarone. Patient also reports balance issues at times. Patient is taking antocoagulation. Patient denies fever or chills. Patient feels tired and weakness. Chief Complaint   Patient presents with    New Patient     no chest pain, does have SOB on exertion, dizziness when going from sitting to standing position, does have palpitations at times very random, no edema in legs does wear compression socks. Location, Quality, severity, Timing, Duration, context, modifying factors, associated signs and symptoms      Pastmedical history:   Past Medical History:   Diagnosis Date    Arthritis     CAD (coronary artery disease)     Hyperlipidemia     Hypertension        Surgical history :   Past Surgical History:   Procedure Laterality Date    COLONOSCOPY  2/4/13    Diverticulosis    CYST REMOVAL      baker cyst on right knee    HERNIA REPAIR      PTCA         Family history: History reviewed. No pertinent family history. Social history :  reports that he has never smoked. He has never used smokeless tobacco. He reports current alcohol use. He reports that he does not use drugs. No Known Allergies    Current Outpatient Medications on File Prior to Visit   Medication Sig Dispense Refill    apixaban (ELIQUIS) 5 MG TABS tablet Take 1 tablet by mouth 2 times daily 60 tablet 3    metoprolol tartrate (LOPRESSOR) 50 MG tablet Take 1 tablet by mouth 2 times daily 60 tablet 3    furosemide (LASIX) 20 MG tablet Take 20 mg by mouth daily      midodrine (PROAMATINE) 5 MG tablet Take 10 mg by mouth 3 times daily      tamsulosin (FLOMAX) 0.4 MG capsule Take 0.8 mg by mouth daily      atorvastatin (LIPITOR) 40 MG tablet Take 40 mg by mouth daily      albuterol sulfate  (90 Base) MCG/ACT inhaler Inhale 2 puffs into the lungs every 6 hours as needed for Wheezing 1 Inhaler 5    amiodarone (CORDARONE) 200 MG tablet Take 1 tablet by mouth daily 30 tablet 3    Multiple Vitamins-Minerals (THERAPEUTIC MULTIVITAMIN-MINERALS) tablet Take 1 tablet by mouth daily       No current facility-administered medications on file prior to visit. Review of Systems:   Review of Systems   Constitutional: Positive for fatigue. Negative for activity change, chills and fever. HENT: Negative for congestion, ear pain and tinnitus. Eyes: Negative for photophobia, pain and visual disturbance. Respiratory: Positive for shortness of breath. Negative for cough, chest tightness and wheezing. Cardiovascular: Positive for palpitations. Negative for chest pain and leg swelling. Gastrointestinal: Negative for abdominal pain, blood in stool, constipation, diarrhea, nausea and vomiting. Endocrine: Negative for cold intolerance and heat intolerance. Genitourinary: Negative for dysuria, flank pain and hematuria. Musculoskeletal: Positive for arthralgias. Negative for back pain, myalgias and neck stiffness. Skin: Negative for color change and rash. Allergic/Immunologic: Negative for food allergies. Neurological: Positive for dizziness and light-headedness. Negative for syncope, numbness and headaches. Hematological: Does not bruise/bleed easily. Psychiatric/Behavioral: Negative for agitation, behavioral problems and confusion. Examination:      Vitals:    12/30/20 0837 12/30/20 0845 12/30/20 0850   BP: 132/84 (!) 144/90 118/82   Site: Left Upper Arm Left Upper Arm Left Upper Arm   Position: Sitting Supine Standing   Pulse: 62 61 63   Resp: 13     Temp: 96.8 °F (36 °C)     SpO2: 98%     Weight: 178 lb (80.7 kg)     Height: 5' 10\" (1.778 m)          Body mass index is 25.54 kg/m². Physical Exam  Constitutional:       General: He is not in acute distress. Appearance: He is well-developed. HENT:      Head: Normocephalic and atraumatic. Eyes:      Pupils: Pupils are equal, round, and reactive to light. Neck:      Musculoskeletal: Normal range of motion. Vascular: No JVD. Cardiovascular:      Rate and Rhythm: Normal rate and regular rhythm. Heart sounds: Murmur (grade 2/6 systolic murmur) present. No friction rub. Pulmonary:      Effort: Pulmonary effort is normal. No respiratory distress. Breath sounds: Normal breath sounds. No wheezing or rales. Abdominal:      General: Bowel sounds are normal. There is no distension. Palpations: Abdomen is soft. Tenderness: There is no abdominal tenderness. Musculoskeletal:         General: No tenderness. Skin:     General: Skin is warm and dry. Neurological:      Mental Status: He is alert and oriented to person, place, and time. Cranial Nerves: No cranial nerve deficit.                CBC:   Lab Results   Component Value Date    WBC 7.0 12/17/2020    HGB 12.5 12/17/2020    HCT 39.3 12/17/2020     12/17/2020     Lipids:  Lab Results   Component Value Date    CHOL 151 06/26/2013    TRIG 143 06/26/2013    HDL 56 (L) 06/26/2013    LDLCALC 94 01/11/2011    LDLDIRECT 65 06/26/2013     PT/INR:   Lab Results   Component Value Date    INR 1.11 12/17/2020        BMP: Lab Results   Component Value Date     12/17/2020    K 3.6 12/17/2020     12/17/2020    CO2 23 12/17/2020    BUN 12 12/17/2020     CMP:   Lab Results   Component Value Date    AST 17 12/17/2020    PROT 5.7 (L) 12/17/2020    BILITOT 0.4 12/17/2020    ALKPHOS 80 12/17/2020     TSH:  No results found for: TSH    EKGINTERPRETATION - EKG Interpretation:  SINUS RHYTHM      IMPRESSION / RECOMMENDATIONS:     1. PAF  2. Orthostatic hypotension  3. HTN  4. HLD  5. CAD  6. LUCILLE    Patient is having PAF with RVR episodes back on amiodarone. Patient reports some Balance issues with it. Patient also has episodes of orthostatic hypotension. Patient was on amlodipine and tamsulosin     Tamsulosin could be contributing for orthostatic hypotension now the dose is also increased for relieving symptoms. Still continues to have it I think the best thing is to discuss with urology to see if he can have surgery for BPH. And also may be change tamsulosin to finasteride. But finasteride takes 2 weeks to act at least.    Patient for orthostatic hypotension is on midodrine and Florinef and still continues to have it. Medication adjustment needs to be done in this patient so best is to discuss with urology    Meanwhile for A. fib we will schedule for A. fib ablation given that symptomatic atrial fibrillation. Patient also needs to come off amiodarone eventually as he is having some issues with amiodarone could be balance issues from the amiodarone. Discussed risk with the procedure about A. fib ablation and patient agrees with the plan. Thanks again for allowing me to participate in care of this patient. Please call me if you have any questions. With best regards.       Sudhakar Machado MD, 12/30/2020 8:56 AM     Please note this report has been partially produced using speech recognition software and may contain errors related to that system including errors in grammar, punctuation, and spelling, as well

## 2020-12-30 NOTE — LETTER
Eva Mullen     PROCEDURE: Atrial Fibrillation  Ablation    Date of Procedure: 21  Time: 730   Arrival Time: 600    Patient Name: Daron Hsu  : 1940   MRN# [id number]    HOSPITAL: [facility])    Call to Pre-[facility] at [phone]  2 days before your procedure    x Please have blood work and chest-x-ray done 1/15/21 at [facility], [address] [place] [address] or [facility].     x Please do not have anything by mouth after midnight prior to or 8 hours before the procedure.    x You may take your medications with a sip of water in the morning before your procedure or take them with you unless listed below. X  Hold Eliquis the evening dose the day before procedure and the morning dose of the procedure. X  Hold all Blood Pressure Medications morning of procedure    X  Please have COVID-19 Test done on 1/15/21 (@ [place] [id number]) after labs and Xray are completed. You will need to Quarantine yourself until procedure. Patient Signature:  _______________________  Staff: Rosario Barbour     Staff Given Instructions:_______________________________                          Eva Mullen     PROCEDURE: Atrial Fibrillation  Ablation with transesophageal echocardiogram    Date of Procedure: 21 Time: 730 Arrival Time: 600    Patient Name: Daron Hsu  : 1940   MRN# [id number]    Day of Procedure Cath Lab Holding area Pre op Orders:    ? YOU HAVE MY PERMISSION TO TALK TO THE PATIENT-PLEASE   ? NPO  ? IF ORDERED FOR AFIB PATIENTS ONLY CARDIAC CT SCAN ANGIO (PULMONARY MAPPING)   ? Anesthesia  ? MICHAEL with Anesthesia  ? IV peripheral saline lock x 2 sites  (at least one in preferred left arm).   ? Type & Screen STAT on arrival.  ? ANTICOUGLATION:   Hold Eliquis evening dose the night before the procedure and the morning dose of the procedure ? If taking Coumadin, PT INR  STAT on arrival day of procedure. ? Insert Ling catheter (male patients >>please use coude ling catheter). ? Female patients <=48years of age >> urine HCG test.  ? Diabetic patients >> Accu check Blood sugar check. ? Document home medications in EPIC and include date and time of last dose. ? Notify Dr. Zachary Haas of abnormal lab results. ? Chest Prep> Clip hair anterior chest and posterior back (clavicle to umbilicus). ? Groin Prep> Clip hair bilateral groins for femoral access (medial). Physician Signature:_______________________Date:____________Time:___________                                       [facility] ([facility]) Informed Consent for Anesthesia/Sedation, Surgery, Invasive Procedures, and other High-risk Interventions and Medication use     *This consent is applicable for 30 days following patient signature*    Procedure(s)   Zack COLLINS authorize, Dr. Dain Sanders   and the associate(s) or assistant(s) of his/her choice, to perform the following procedure(s): Atrial Fibrillation  Ablation with transesophageal echocardiogram     I know that unexpected conditions may require additional or different procedures than those above. I authorize the above named practitioner(s) perform these as necessary and desirable. This is based on the practitioners professional judgment. The above named practitioner has discussed the above procedure(s) with me, including:  ? Potential benefits, including likelihood of success of the procedure(s) goals  ? Risks  ? Side effects, risk of death, and risk of infection  ? Any potential problems that might occur during recuperation or healing post-procedure  ? Reasonable alternatives  ? Risks of NOT performing the procedure(s)    I acknowledge that no warranty or guarantee has been made to the results the procedure(s). I consent to the above named practitioner(s) providing additional services to me as deemed reasonable and necessary, including but not limited to:    ? Use of medications for anesthesia or sedation. ? All anesthesia and sedation carry risks. My practitioner has discussed my anticipated anesthesia and/or sedation and the risks of using, risk of not using, benefits, side effects, and alternatives. ? Use of pathology  ? I authorize [facility] ([facility]) to dispose of tissues, specimens or organs when pathology is complete. ? Use of radiology  ? A contrast agent may be required for radiology procedures. My practitioner has advised me of the risks of using, risks of not using, benefits, side effects, and alternatives. ? Observers or use of photography, video/audio recording, or televising of the procedure(s). This is for medical, scientific, or educational purposes. This includes appropriate portions of my body. My identity will not be revealed. ? I consent to release of my social security number and other identifying information to [employer] (FDA), and the supplier/, if I receive tissue, a device, or implant. This is to track the tissue, device, or implant for defect, recall, infection, etc.     ? Use of blood and/or blood products, if needed, through my hospital stay. My practitioner has advised me of the risks of using, risks of not using, benefits, side effects, and alternatives. ___ I do NOT want Blood or Blood products given. (Complete separate  refusal form)    Code Status (radha one):  ___ I do NOT HAVE a DNR order. I am a Full-code.   I will receive CPR, intubation,  chest compressions, medications, and/or other life saving measures if I have a  cardiac or respiratory arrest.    ___ I have a Do Not Resuscitate (DNR)order.   (radha one below) ___  I rescind my DNR for surgery and immediate post-operative period through Phase 2 recovery. This means, for that time period, I will be a Full-code and receive CPR, intubation, chest compressions, medications, and/or other life saving measures, if I have a cardiac or respiratory arrest.    ___ I WANT to keep my DNR in effect during my procedure(s) and immediate post-operative recovery period through Phase 2 recovery. (Complete separate refusal form)     This form has been fully explained to me. I understand its contents. Patients Signature: ___________________________Date: ________  Time: ________    If patient unable to sign, has engaged the [address] [place] [address], is a minor, or has a court-appointed Guardian:  [facility] Representative Name (Print):  ____________________________________      Relationship ([place] one):    Guardian   Parent    Spouse    HCPOA   Child   Sibling  Next-of-Kin Friend    Patients Representative Signature: _______________________________________              Date: ______________  Time: __________    An  was used.  name/ID: _________________________________      [facility]) Witness________________________  Date: ________   Time: _________    Physician/Practitioner _______________________  Date: ________   Time: _________           Revision 2017      Leila Hughes     PATIENT NAME:    Drake Bocanegra                               :   1940    PROCEDURE: Atrial Fibrillation  Ablation with transesophageal echocardiogram        DATE OF PROCEDURE: 21    DIAGNOSIS:  I48.91, Z01.810, Z79.0                     X MAG    X   PHOS       X   BMP                       X CBC     X    PT    X   PTT                    X     Chest x-Ray PA & Lateral View      ? PLEASE CALL ABNORMAL RESULTS TO THE REQUESTING PHYSICIAN? ATTENTION PATIENTS:  You do not have to fast for the lab work. You must go to the [address] [place] [address] or [facility]  to have the lab work done.       Physician Signature:_________________Date:_____________Time:___________                                      [facility]   Dr. Presley Dutta     PROCEDURE TO SCHEDULE:    Atrial Fibrillation Ablation with transesophageal echocardiogram     Patient Name: Cassia Dumont  : 1940   MRN# [id number]    Home Phone Number: [phone]   Weight:    Wt Readings from Last 3 Encounters:   20 178 lb (80.7 kg)   20 172 lb 8 oz (78.2 kg)   10/13/20 171 lb (77.6 kg)        Insurance: Payor: Pasquale Chen / Plan: [facility] [employer] / Product Type: *No Product type* /     Date of Procedure: 21 Time: 0730 Arrival Time: 0600    Diagnosis:  I48.91 (Atrial Fibrillation)  Allergies: No Known Allergies     1) Call [facility] scheduling ([phone]) or Instant Message  CONFIRMED WITH     PHONE OR   INSTANT MESSAGE  2) PREAUTHORIZATION NUMBER:    Spoke to:      From date:     expiration date:        [facility]

## 2021-01-06 ENCOUNTER — TELEPHONE (OUTPATIENT)
Dept: CARDIOLOGY CLINIC | Age: 81
End: 2021-01-06

## 2021-01-12 ENCOUNTER — HOSPITAL ENCOUNTER (OUTPATIENT)
Dept: CT IMAGING | Age: 81
Discharge: HOME | End: 2021-01-12
Payer: MEDICARE

## 2021-01-12 DIAGNOSIS — I48.91 ATRIAL FIBRILLATION WITH RAPID VENTRICULAR RESPONSE (HCC): ICD-10-CM

## 2021-01-12 PROCEDURE — 75572 CT HRT W/3D IMAGE: CPT

## 2021-01-12 PROCEDURE — 6360000004 HC RX CONTRAST MEDICATION: Performed by: INTERNAL MEDICINE

## 2021-01-12 RX ADMIN — IOPAMIDOL 120 ML: 755 INJECTION, SOLUTION INTRAVENOUS at 12:20

## 2021-01-14 ENCOUNTER — NURSE ONLY (OUTPATIENT)
Dept: CARDIOLOGY CLINIC | Age: 81
End: 2021-01-14
Payer: MEDICARE

## 2021-01-14 ENCOUNTER — HOSPITAL ENCOUNTER (OUTPATIENT)
Age: 81
Discharge: HOME | End: 2021-01-14
Payer: MEDICARE

## 2021-01-14 ENCOUNTER — HOSPITAL ENCOUNTER (OUTPATIENT)
Dept: GENERAL RADIOLOGY | Age: 81
Discharge: HOME | End: 2021-01-14
Payer: MEDICARE

## 2021-01-14 ENCOUNTER — HOSPITAL ENCOUNTER (OUTPATIENT)
Age: 81
Setting detail: SPECIMEN
Discharge: HOME | End: 2021-01-14
Payer: MEDICARE

## 2021-01-14 VITALS — TEMPERATURE: 97.3 F

## 2021-01-14 DIAGNOSIS — Z79.01 LONG TERM (CURRENT) USE OF ANTICOAGULANTS: ICD-10-CM

## 2021-01-14 DIAGNOSIS — I48.91 ATRIAL FIBRILLATION, UNSPECIFIED TYPE (HCC): ICD-10-CM

## 2021-01-14 DIAGNOSIS — Z01.810 PRE-OPERATIVE CARDIOVASCULAR EXAMINATION: ICD-10-CM

## 2021-01-14 LAB
ANION GAP SERPL CALCULATED.3IONS-SCNC: 9 MMOL/L (ref 4–16)
APTT: 29.5 SECONDS (ref 25.1–37.1)
BASOPHILS ABSOLUTE: 0 K/CU MM
BASOPHILS RELATIVE PERCENT: 0.5 % (ref 0–1)
BUN BLDV-MCNC: 20 MG/DL (ref 6–23)
CALCIUM SERPL-MCNC: 8.7 MG/DL (ref 8.3–10.6)
CHLORIDE BLD-SCNC: 101 MMOL/L (ref 99–110)
CO2: 25 MMOL/L (ref 21–32)
CREAT SERPL-MCNC: 1.5 MG/DL (ref 0.9–1.3)
DIFFERENTIAL TYPE: ABNORMAL
EOSINOPHILS ABSOLUTE: 0.2 K/CU MM
EOSINOPHILS RELATIVE PERCENT: 4.3 % (ref 0–3)
GFR AFRICAN AMERICAN: 55 ML/MIN/1.73M2
GFR NON-AFRICAN AMERICAN: 45 ML/MIN/1.73M2
GLUCOSE BLD-MCNC: 118 MG/DL (ref 70–99)
HCT VFR BLD CALC: 43.3 % (ref 42–52)
HEMOGLOBIN: 13.6 GM/DL (ref 13.5–18)
IMMATURE NEUTROPHIL %: 0.4 % (ref 0–0.43)
INR BLD: 1.19 INDEX
LYMPHOCYTES ABSOLUTE: 1.1 K/CU MM
LYMPHOCYTES RELATIVE PERCENT: 20.3 % (ref 24–44)
MAGNESIUM: 2.2 MG/DL (ref 1.8–2.4)
MCH RBC QN AUTO: 29.1 PG (ref 27–31)
MCHC RBC AUTO-ENTMCNC: 31.4 % (ref 32–36)
MCV RBC AUTO: 92.5 FL (ref 78–100)
MONOCYTES ABSOLUTE: 0.6 K/CU MM
MONOCYTES RELATIVE PERCENT: 10.4 % (ref 0–4)
NUCLEATED RBC %: 0 %
PDW BLD-RTO: 12.9 % (ref 11.7–14.9)
PHOSPHORUS: 3.7 MG/DL (ref 2.5–4.9)
PLATELET # BLD: 154 K/CU MM (ref 140–440)
PMV BLD AUTO: 11.7 FL (ref 7.5–11.1)
POTASSIUM SERPL-SCNC: 4.4 MMOL/L (ref 3.5–5.1)
PROTHROMBIN TIME: 14.4 SECONDS (ref 11.7–14.5)
RBC # BLD: 4.68 M/CU MM (ref 4.6–6.2)
SEGMENTED NEUTROPHILS ABSOLUTE COUNT: 3.6 K/CU MM
SEGMENTED NEUTROPHILS RELATIVE PERCENT: 64.1 % (ref 36–66)
SODIUM BLD-SCNC: 135 MMOL/L (ref 135–145)
TOTAL IMMATURE NEUTOROPHIL: 0.02 K/CU MM
TOTAL NUCLEATED RBC: 0 K/CU MM
WBC # BLD: 5.6 K/CU MM (ref 4–10.5)

## 2021-01-14 PROCEDURE — 85730 THROMBOPLASTIN TIME PARTIAL: CPT

## 2021-01-14 PROCEDURE — 80048 BASIC METABOLIC PNL TOTAL CA: CPT

## 2021-01-14 PROCEDURE — 83735 ASSAY OF MAGNESIUM: CPT

## 2021-01-14 PROCEDURE — 71046 X-RAY EXAM CHEST 2 VIEWS: CPT

## 2021-01-14 PROCEDURE — 85025 COMPLETE CBC W/AUTO DIFF WBC: CPT

## 2021-01-14 PROCEDURE — 99211 OFF/OP EST MAY X REQ PHY/QHP: CPT | Performed by: INTERNAL MEDICINE

## 2021-01-14 PROCEDURE — U0002 COVID-19 LAB TEST NON-CDC: HCPCS

## 2021-01-14 PROCEDURE — 36415 COLL VENOUS BLD VENIPUNCTURE: CPT

## 2021-01-14 PROCEDURE — 85610 PROTHROMBIN TIME: CPT

## 2021-01-14 PROCEDURE — 84100 ASSAY OF PHOSPHORUS: CPT

## 2021-01-14 NOTE — PROGRESS NOTES
Patient informed of instructions and guidance for performing test.  Patient was wearing a mask and provided with gloves and tissues. Patient performed COVID nasal swab for 10 seconds in each nostril. This RN present in the room, 6 feet away. Patient dropped swab in  labeled collection tube. Collection tube and lab order placed in plastic bag. Bag placed in biohazard bag and placed in fridge.  called for .

## 2021-01-15 LAB
SARS-COV-2: NOT DETECTED
SOURCE: NORMAL

## 2021-01-20 ENCOUNTER — ANESTHESIA EVENT (OUTPATIENT)
Dept: CARDIAC CATH/INVASIVE PROCEDURES | Age: 81
End: 2021-01-20
Payer: MEDICARE

## 2021-01-20 NOTE — ANESTHESIA PRE PROCEDURE
Department of Anesthesiology  Preprocedure Note       Name:  Belen Kay   Age:  68 y.o.  :  1940                                          MRN:  [id number]         Date:  2021      Surgeon: * Surgery not found *    Procedure:     Medications prior to admission:   Prior to Admission medications    Medication Sig Start Date End Date Taking?  Authorizing Provider   metoprolol tartrate (LOPRESSOR) 50 MG tablet Take 1 tablet by mouth 2 times daily 20   Sammi Jaramillo MD   furosemide (LASIX) 20 MG tablet Take 20 mg by mouth daily    Historical Provider, MD   midodrine (PROAMATINE) 5 MG tablet Take 10 mg by mouth 3 times daily    Historical Provider, MD   tamsulosin (FLOMAX) 0.4 MG capsule Take 0.8 mg by mouth daily    Historical Provider, MD   atorvastatin (LIPITOR) 40 MG tablet Take 40 mg by mouth daily    Historical Provider, MD   albuterol sulfate  (90 Base) MCG/ACT inhaler Inhale 2 puffs into the lungs every 6 hours as needed for Wheezing 20   Zohreh Leos MD   amiodarone (CORDARONE) 200 MG tablet Take 1 tablet by mouth daily 3/30/20   Munir Cody MD   Multiple Vitamins-Minerals (THERAPEUTIC MULTIVITAMIN-MINERALS) tablet Take 1 tablet by mouth daily    Historical Provider, MD       Current medications:    Current Outpatient Medications   Medication Sig Dispense Refill    metoprolol tartrate (LOPRESSOR) 50 MG tablet Take 1 tablet by mouth 2 times daily 60 tablet 3    furosemide (LASIX) 20 MG tablet Take 20 mg by mouth daily      midodrine (PROAMATINE) 5 MG tablet Take 10 mg by mouth 3 times daily      tamsulosin (FLOMAX) 0.4 MG capsule Take 0.8 mg by mouth daily      atorvastatin (LIPITOR) 40 MG tablet Take 40 mg by mouth daily      albuterol sulfate  (90 Base) MCG/ACT inhaler Inhale 2 puffs into the lungs every 6 hours as needed for Wheezing 1 Inhaler 5    amiodarone (CORDARONE) 200 MG tablet Take 1 tablet by mouth daily 30 tablet 3    Multiple Vitamins-Minerals (THERAPEUTIC MULTIVITAMIN-MINERALS) tablet Take 1 tablet by mouth daily       No current facility-administered medications for this encounter. Allergies:  No Known Allergies    Problem List:    Patient Active Problem List   Diagnosis Code    [facility] I48.91    LUCILLE on CPAP G47.33, Z99.89    Atrial fibrillation with rapid ventricular response ([facility] I48.91       Past Medical History:        Diagnosis Date    Arthritis     CAD (coronary artery disease)     Hyperlipidemia     Hypertension        Past Surgical History:        Procedure Laterality Date    COLONOSCOPY  2/4/13    Diverticulosis    CYST REMOVAL      baker cyst on right knee    HERNIA REPAIR      PTCA         Social History:    Social History     Tobacco Use    Smoking status: Never Smoker    Smokeless tobacco: Never Used   Substance Use Topics    Alcohol use: Yes     Comment: occas                                Counseling given: Not Answered      Vital Signs (Current): There were no vitals filed for this visit.                                            BP Readings from Last 3 Encounters:   12/30/20 118/82   12/18/20 119/81   08/31/20 128/86       NPO Status:                                                                                 BMI:   Wt Readings from Last 3 Encounters:   01/13/21 178 lb (80.7 kg)   12/30/20 178 lb (80.7 kg)   12/18/20 172 lb 8 oz (78.2 kg)     There is no height or weight on file to calculate BMI.    CBC:   Lab Results   Component Value Date    WBC 5.6 01/14/2021    RBC 4.68 01/14/2021    HGB 13.6 01/14/2021    HCT 43.3 01/14/2021    MCV 92.5 01/14/2021    RDW 12.9 01/14/2021     01/14/2021       CMP:   Lab Results   Component Value Date     01/14/2021    K 4.4 01/14/2021     01/14/2021    CO2 25 01/14/2021    BUN 20 01/14/2021    CREATININE 1.5 01/14/2021    GFRAA 55 01/14/2021    LABGLOM 45 01/14/2021    GLUCOSE 118 01/14/2021    PROT 5.7 12/17/2020    CALCIUM 8.7 01/14/2021    BILITOT 0.4 12/17/2020    ALKPHOS 80 12/17/2020    AST 17 12/17/2020    ALT 15 12/17/2020       POC Tests: No results for input(s): POCGLU, POCNA, POCK, POCCL, POCBUN, POCHEMO, POCHCT in the last 72 hours. Coags:   Lab Results   Component Value Date    PROTIME 14.4 01/14/2021    INR 1.19 01/14/2021    APTT 29.5 01/14/2021       HCG (If Applicable): No results found for: PREGTESTUR, PREGSERUM, HCG, HCGQUANT     ABGs: No results found for: PHART, PO2ART, LLQ8RNA, GPW0EYC, BEART, I6KSNBYM     Type & Screen (If Applicable):  No results found for: LABABO, LABRH    Drug/Infectious Status (If Applicable):  No results found for: HIV, HEPCAB    COVID-19 Screening (If Applicable):   Lab Results   Component Value Date    COVID19 NOT DETECTED 01/14/2021         Anesthesia Evaluation  Patient summary reviewed  Airway: Mallampati: I  TM distance: >3 FB   Neck ROM: full  Mouth opening: > = 3 FB Dental:    (+) implants  Comment: Upper and lower permanent bridge    Pulmonary:normal exam    (+) sleep apnea: on CPAP,                             Cardiovascular:    (+) hypertension:, CAD:, dysrhythmias: atrial fibrillation, hyperlipidemia        Rhythm: irregular  Rate: abnormal                 ROS comment: Echo 12/2020:  IMPRESSION:  1. No clot or thrombus noted in the left atrium, left atrial appendage,  or LV cavity. 2.  LV function is preserved at 50% to 55%. 3.  Mild-to-moderate mitral regurgitation noted. [facility] 2/2020:  IMPRESSION:  1. Left main is patent. 2.  Circ has mild disease noted in OM1 and OM2 mild disease noted. 3.  LAD has a proximal stent and mid stent present, which is widely  patent. 4.  RCA has mild disease noted, is a dominant vessel. 5.  EDP was normal.  6.  Right heart pressure was normal.     The patient has patent stents in the LAD present; otherwise, mild  disease in the circ and RCA present. Normal right heart pressure  present.     At this time, we will continue medical treatment.

## 2021-01-21 ENCOUNTER — APPOINTMENT (OUTPATIENT)
Dept: GENERAL RADIOLOGY | Age: 81
End: 2021-01-21
Attending: INTERNAL MEDICINE
Payer: MEDICARE

## 2021-01-21 ENCOUNTER — HOSPITAL ENCOUNTER (OUTPATIENT)
Dept: CARDIAC CATH/INVASIVE PROCEDURES | Age: 81
Discharge: HOME | End: 2021-01-22
Attending: INTERNAL MEDICINE | Admitting: INTERNAL MEDICINE
Payer: MEDICARE

## 2021-01-21 ENCOUNTER — ANESTHESIA (OUTPATIENT)
Dept: CARDIAC CATH/INVASIVE PROCEDURES | Age: 81
End: 2021-01-21
Payer: MEDICARE

## 2021-01-21 VITALS
RESPIRATION RATE: 2 BRPM | DIASTOLIC BLOOD PRESSURE: 68 MMHG | SYSTOLIC BLOOD PRESSURE: 96 MMHG | TEMPERATURE: 95.8 F | OXYGEN SATURATION: 99 %

## 2021-01-21 PROBLEM — Z86.79 S/P ABLATION OF ATRIAL FIBRILLATION: Status: ACTIVE | Noted: 2021-01-21

## 2021-01-21 PROBLEM — Z98.890 S/P ABLATION OF ATRIAL FIBRILLATION: Status: ACTIVE | Noted: 2021-01-21

## 2021-01-21 LAB
ACTIVATED CLOTTING TIME, LOW RANGE: 144 SEC
ACTIVATED CLOTTING TIME, LOW RANGE: 198 SEC
ACTIVATED CLOTTING TIME, LOW RANGE: 258 SEC
ACTIVATED CLOTTING TIME, LOW RANGE: 325 SEC
ACTIVATED CLOTTING TIME, LOW RANGE: 331 SEC
ACTIVATED CLOTTING TIME, LOW RANGE: 351 SEC
BASE EXCESS MIXED: 0.4 (ref 0–1.2)
BASE EXCESS: ABNORMAL (ref 0–3.3)
CO2 CONTENT: 25.2 MMOL/L (ref 19–24)
GLUCOSE BLD-MCNC: 98 MG/DL (ref 70–99)
HCO3 ARTERIAL: 24.1 MMOL/L (ref 18–23)
HCT VFR BLD CALC: 33 % (ref 42–52)
HEMOGLOBIN: 11.2 GM/DL (ref 13.5–18)
O2 SATURATION: 100 % (ref 96–97)
PCO2 ARTERIAL: 37.7 MMHG (ref 32–45)
PH BLOOD: 7.41 (ref 7.34–7.45)
PO2 ARTERIAL: 439 MMHG (ref 75–100)
POC CALCIUM: 1.17 MMOL/L (ref 1.12–1.32)
POTASSIUM SERPL-SCNC: 3.3 MMOL/L (ref 3.5–4.5)
SODIUM BLD-SCNC: 146 MMOL/L (ref 138–146)
SOURCE, BLOOD GAS: ABNORMAL

## 2021-01-21 PROCEDURE — 2580000003 HC RX 258

## 2021-01-21 PROCEDURE — 85347 COAGULATION TIME ACTIVATED: CPT

## 2021-01-21 PROCEDURE — 2709999900 HC NON-CHARGEABLE SUPPLY

## 2021-01-21 PROCEDURE — 93656 COMPRE EP EVAL ABLTJ ATR FIB: CPT | Performed by: INTERNAL MEDICINE

## 2021-01-21 PROCEDURE — 94761 N-INVAS EAR/PLS OXIMETRY MLT: CPT

## 2021-01-21 PROCEDURE — 93662 INTRACARDIAC ECG (ICE): CPT | Performed by: INTERNAL MEDICINE

## 2021-01-21 PROCEDURE — 6360000002 HC RX W HCPCS

## 2021-01-21 PROCEDURE — 93655 ICAR CATH ABLTJ DSCRT ARRHYT: CPT | Performed by: INTERNAL MEDICINE

## 2021-01-21 PROCEDURE — 93613 INTRACARDIAC EPHYS 3D MAPG: CPT

## 2021-01-21 PROCEDURE — C1732 CATH, EP, DIAG/ABL, 3D/VECT: HCPCS

## 2021-01-21 PROCEDURE — C1753 CATH, INTRAVAS ULTRASOUND: HCPCS

## 2021-01-21 PROCEDURE — 93308 TTE F-UP OR LMTD: CPT

## 2021-01-21 PROCEDURE — 6370000000 HC RX 637 (ALT 250 FOR IP): Performed by: INTERNAL MEDICINE

## 2021-01-21 PROCEDURE — 3700000000 HC ANESTHESIA ATTENDED CARE

## 2021-01-21 PROCEDURE — 82962 GLUCOSE BLOOD TEST: CPT

## 2021-01-21 PROCEDURE — 85018 HEMOGLOBIN: CPT

## 2021-01-21 PROCEDURE — 84132 ASSAY OF SERUM POTASSIUM: CPT

## 2021-01-21 PROCEDURE — APPNB15 APP NON BILLABLE TIME 0-15 MINS: Performed by: NURSE PRACTITIONER

## 2021-01-21 PROCEDURE — 71045 X-RAY EXAM CHEST 1 VIEW: CPT

## 2021-01-21 PROCEDURE — 2580000003 HC RX 258: Performed by: INTERNAL MEDICINE

## 2021-01-21 PROCEDURE — 93655 ICAR CATH ABLTJ DSCRT ARRHYT: CPT

## 2021-01-21 PROCEDURE — 94150 VITAL CAPACITY TEST: CPT

## 2021-01-21 PROCEDURE — 2720000010 HC SURG SUPPLY STERILE

## 2021-01-21 PROCEDURE — 7100000000 HC PACU RECOVERY - FIRST 15 MIN

## 2021-01-21 PROCEDURE — 93656 COMPRE EP EVAL ABLTJ ATR FIB: CPT

## 2021-01-21 PROCEDURE — 7100000001 HC PACU RECOVERY - ADDTL 15 MIN

## 2021-01-21 PROCEDURE — 93312 ECHO TRANSESOPHAGEAL: CPT | Performed by: INTERNAL MEDICINE

## 2021-01-21 PROCEDURE — 84295 ASSAY OF SERUM SODIUM: CPT

## 2021-01-21 PROCEDURE — 82803 BLOOD GASES ANY COMBINATION: CPT

## 2021-01-21 PROCEDURE — 93613 INTRACARDIAC EPHYS 3D MAPG: CPT | Performed by: INTERNAL MEDICINE

## 2021-01-21 PROCEDURE — C1894 INTRO/SHEATH, NON-LASER: HCPCS

## 2021-01-21 PROCEDURE — 94664 DEMO&/EVAL PT USE INHALER: CPT

## 2021-01-21 PROCEDURE — 93010 ELECTROCARDIOGRAM REPORT: CPT | Performed by: INTERNAL MEDICINE

## 2021-01-21 PROCEDURE — 3700000001 HC ADD 15 MINUTES (ANESTHESIA)

## 2021-01-21 PROCEDURE — 93312 ECHO TRANSESOPHAGEAL: CPT

## 2021-01-21 PROCEDURE — 85014 HEMATOCRIT: CPT

## 2021-01-21 PROCEDURE — 93005 ELECTROCARDIOGRAM TRACING: CPT | Performed by: INTERNAL MEDICINE

## 2021-01-21 PROCEDURE — 93325 DOPPLER ECHO COLOR FLOW MAPG: CPT | Performed by: INTERNAL MEDICINE

## 2021-01-21 PROCEDURE — 93662 INTRACARDIAC ECG (ICE): CPT

## 2021-01-21 PROCEDURE — 82330 ASSAY OF CALCIUM: CPT

## 2021-01-21 PROCEDURE — 2500000003 HC RX 250 WO HCPCS

## 2021-01-21 PROCEDURE — C1733 CATH, EP, OTHR THAN COOL-TIP: HCPCS

## 2021-01-21 RX ORDER — MEPERIDINE HYDROCHLORIDE 25 MG/ML
12.5 INJECTION INTRAMUSCULAR; INTRAVENOUS; SUBCUTANEOUS EVERY 5 MIN PRN
Status: DISCONTINUED | OUTPATIENT
Start: 2021-01-21 | End: 2021-01-21

## 2021-01-21 RX ORDER — LIDOCAINE HYDROCHLORIDE 20 MG/ML
INJECTION, SOLUTION EPIDURAL; INFILTRATION; INTRACAUDAL; PERINEURAL PRN
Status: DISCONTINUED | OUTPATIENT
Start: 2021-01-21 | End: 2021-01-21 | Stop reason: SDUPTHER

## 2021-01-21 RX ORDER — SODIUM CHLORIDE 0.9 % (FLUSH) 0.9 %
10 SYRINGE (ML) INJECTION EVERY 12 HOURS SCHEDULED
Status: DISCONTINUED | OUTPATIENT
Start: 2021-01-21 | End: 2021-01-22 | Stop reason: HOSPADM

## 2021-01-21 RX ORDER — PROMETHAZINE HYDROCHLORIDE 25 MG/ML
6.25 INJECTION, SOLUTION INTRAMUSCULAR; INTRAVENOUS
Status: DISCONTINUED | OUTPATIENT
Start: 2021-01-21 | End: 2021-01-21

## 2021-01-21 RX ORDER — ATORVASTATIN CALCIUM 40 MG/1
40 TABLET, FILM COATED ORAL DAILY
Status: DISCONTINUED | OUTPATIENT
Start: 2021-01-21 | End: 2021-01-22 | Stop reason: HOSPADM

## 2021-01-21 RX ORDER — SODIUM CHLORIDE 9 MG/ML
INJECTION, SOLUTION INTRAVENOUS CONTINUOUS PRN
Status: DISCONTINUED | OUTPATIENT
Start: 2021-01-21 | End: 2021-01-21 | Stop reason: SDUPTHER

## 2021-01-21 RX ORDER — MIDODRINE HYDROCHLORIDE 5 MG/1
10 TABLET ORAL 3 TIMES DAILY
Status: DISCONTINUED | OUTPATIENT
Start: 2021-01-21 | End: 2021-01-22 | Stop reason: HOSPADM

## 2021-01-21 RX ORDER — MAGNESIUM SULFATE IN WATER 40 MG/ML
2000 INJECTION, SOLUTION INTRAVENOUS PRN
Status: DISCONTINUED | OUTPATIENT
Start: 2021-01-21 | End: 2021-01-22 | Stop reason: HOSPADM

## 2021-01-21 RX ORDER — DIPHENHYDRAMINE HYDROCHLORIDE 50 MG/ML
12.5 INJECTION INTRAMUSCULAR; INTRAVENOUS
Status: DISCONTINUED | OUTPATIENT
Start: 2021-01-21 | End: 2021-01-21

## 2021-01-21 RX ORDER — DEXAMETHASONE SODIUM PHOSPHATE 4 MG/ML
INJECTION, SOLUTION INTRA-ARTICULAR; INTRALESIONAL; INTRAMUSCULAR; INTRAVENOUS; SOFT TISSUE PRN
Status: DISCONTINUED | OUTPATIENT
Start: 2021-01-21 | End: 2021-01-21 | Stop reason: SDUPTHER

## 2021-01-21 RX ORDER — HYDROCODONE BITARTRATE AND ACETAMINOPHEN 5; 325 MG/1; MG/1
1 TABLET ORAL PRN
Status: DISCONTINUED | OUTPATIENT
Start: 2021-01-21 | End: 2021-01-21

## 2021-01-21 RX ORDER — PANTOPRAZOLE SODIUM 40 MG/1
40 TABLET, DELAYED RELEASE ORAL
Status: DISCONTINUED | OUTPATIENT
Start: 2021-01-22 | End: 2021-01-22 | Stop reason: HOSPADM

## 2021-01-21 RX ORDER — PROPOFOL 10 MG/ML
INJECTION, EMULSION INTRAVENOUS PRN
Status: DISCONTINUED | OUTPATIENT
Start: 2021-01-21 | End: 2021-01-21 | Stop reason: SDUPTHER

## 2021-01-21 RX ORDER — AMIODARONE HYDROCHLORIDE 200 MG/1
200 TABLET ORAL DAILY
Status: DISCONTINUED | OUTPATIENT
Start: 2021-01-21 | End: 2021-01-22 | Stop reason: HOSPADM

## 2021-01-21 RX ORDER — FENTANYL CITRATE 50 UG/ML
50 INJECTION, SOLUTION INTRAMUSCULAR; INTRAVENOUS EVERY 5 MIN PRN
Status: DISCONTINUED | OUTPATIENT
Start: 2021-01-21 | End: 2021-01-21

## 2021-01-21 RX ORDER — HYDROCODONE BITARTRATE AND ACETAMINOPHEN 5; 325 MG/1; MG/1
2 TABLET ORAL PRN
Status: DISCONTINUED | OUTPATIENT
Start: 2021-01-21 | End: 2021-01-21

## 2021-01-21 RX ORDER — SODIUM CHLORIDE 0.9 % (FLUSH) 0.9 %
10 SYRINGE (ML) INJECTION PRN
Status: DISCONTINUED | OUTPATIENT
Start: 2021-01-21 | End: 2021-01-22 | Stop reason: HOSPADM

## 2021-01-21 RX ORDER — FUROSEMIDE 20 MG/1
20 TABLET ORAL DAILY
Status: DISCONTINUED | OUTPATIENT
Start: 2021-01-21 | End: 2021-01-22 | Stop reason: HOSPADM

## 2021-01-21 RX ORDER — HYDROMORPHONE HCL 110MG/55ML
0.5 PATIENT CONTROLLED ANALGESIA SYRINGE INTRAVENOUS EVERY 5 MIN PRN
Status: DISCONTINUED | OUTPATIENT
Start: 2021-01-21 | End: 2021-01-21

## 2021-01-21 RX ORDER — ACETAMINOPHEN 325 MG/1
650 TABLET ORAL EVERY 8 HOURS PRN
Status: DISCONTINUED | OUTPATIENT
Start: 2021-01-21 | End: 2021-01-22 | Stop reason: HOSPADM

## 2021-01-21 RX ORDER — HYDRALAZINE HYDROCHLORIDE 20 MG/ML
5 INJECTION INTRAMUSCULAR; INTRAVENOUS EVERY 10 MIN PRN
Status: DISCONTINUED | OUTPATIENT
Start: 2021-01-21 | End: 2021-01-21

## 2021-01-21 RX ORDER — ALBUTEROL SULFATE 90 UG/1
2 AEROSOL, METERED RESPIRATORY (INHALATION) EVERY 6 HOURS PRN
Status: DISCONTINUED | OUTPATIENT
Start: 2021-01-21 | End: 2021-01-22 | Stop reason: HOSPADM

## 2021-01-21 RX ORDER — FENTANYL CITRATE 50 UG/ML
INJECTION, SOLUTION INTRAMUSCULAR; INTRAVENOUS PRN
Status: DISCONTINUED | OUTPATIENT
Start: 2021-01-21 | End: 2021-01-21 | Stop reason: SDUPTHER

## 2021-01-21 RX ORDER — ROCURONIUM BROMIDE 10 MG/ML
INJECTION, SOLUTION INTRAVENOUS PRN
Status: DISCONTINUED | OUTPATIENT
Start: 2021-01-21 | End: 2021-01-21 | Stop reason: SDUPTHER

## 2021-01-21 RX ORDER — GLYCOPYRROLATE 0.2 MG/ML
INJECTION INTRAMUSCULAR; INTRAVENOUS PRN
Status: DISCONTINUED | OUTPATIENT
Start: 2021-01-21 | End: 2021-01-21 | Stop reason: SDUPTHER

## 2021-01-21 RX ORDER — LABETALOL HYDROCHLORIDE 5 MG/ML
5 INJECTION, SOLUTION INTRAVENOUS EVERY 10 MIN PRN
Status: DISCONTINUED | OUTPATIENT
Start: 2021-01-21 | End: 2021-01-21

## 2021-01-21 RX ORDER — M-VIT,TX,IRON,MINS/CALC/FOLIC 27MG-0.4MG
1 TABLET ORAL DAILY
Status: DISCONTINUED | OUTPATIENT
Start: 2021-01-21 | End: 2021-01-22 | Stop reason: HOSPADM

## 2021-01-21 RX ADMIN — SODIUM CHLORIDE: 900 INJECTION INTRAVENOUS at 07:34

## 2021-01-21 RX ADMIN — SODIUM CHLORIDE, PRESERVATIVE FREE 10 ML: 5 INJECTION INTRAVENOUS at 23:49

## 2021-01-21 RX ADMIN — AMIODARONE HYDROCHLORIDE 200 MG: 200 TABLET ORAL at 19:03

## 2021-01-21 RX ADMIN — PROPOFOL 100 MG: 10 INJECTION, EMULSION INTRAVENOUS at 07:50

## 2021-01-21 RX ADMIN — PHENYLEPHRINE HYDROCHLORIDE 100 MCG: 10 INJECTION INTRAVENOUS at 08:11

## 2021-01-21 RX ADMIN — ATORVASTATIN CALCIUM 40 MG: 40 TABLET, FILM COATED ORAL at 19:03

## 2021-01-21 RX ADMIN — FENTANYL CITRATE 50 MCG: 50 INJECTION INTRAMUSCULAR; INTRAVENOUS at 10:25

## 2021-01-21 RX ADMIN — FUROSEMIDE 20 MG: 20 TABLET ORAL at 19:03

## 2021-01-21 RX ADMIN — GLYCOPYRROLATE 0.2 MG: 0.2 INJECTION, SOLUTION INTRAMUSCULAR; INTRAVENOUS at 08:08

## 2021-01-21 RX ADMIN — ROCURONIUM BROMIDE 50 MG: 10 INJECTION INTRAVENOUS at 07:51

## 2021-01-21 RX ADMIN — DEXAMETHASONE SODIUM PHOSPHATE 4 MG: 4 INJECTION, SOLUTION INTRAMUSCULAR; INTRAVENOUS at 08:21

## 2021-01-21 RX ADMIN — PHENYLEPHRINE HYDROCHLORIDE 100 MCG: 10 INJECTION INTRAVENOUS at 08:08

## 2021-01-21 RX ADMIN — SUGAMMADEX 200 MG: 100 INJECTION, SOLUTION INTRAVENOUS at 10:21

## 2021-01-21 RX ADMIN — MULTIPLE VITAMINS W/ MINERALS TAB 1 TABLET: TAB at 19:03

## 2021-01-21 RX ADMIN — FENTANYL CITRATE 50 MCG: 50 INJECTION INTRAMUSCULAR; INTRAVENOUS at 07:49

## 2021-01-21 RX ADMIN — SUGAMMADEX 200 MG: 100 INJECTION, SOLUTION INTRAVENOUS at 10:25

## 2021-01-21 RX ADMIN — MIDODRINE HYDROCHLORIDE 10 MG: 5 TABLET ORAL at 23:48

## 2021-01-21 RX ADMIN — ROCURONIUM BROMIDE 10 MG: 10 INJECTION INTRAVENOUS at 10:10

## 2021-01-21 RX ADMIN — LIDOCAINE HYDROCHLORIDE 100 MG: 20 INJECTION, SOLUTION EPIDURAL; INFILTRATION; INTRACAUDAL; PERINEURAL at 07:50

## 2021-01-21 RX ADMIN — PROPOFOL 20 MG: 10 INJECTION, EMULSION INTRAVENOUS at 07:51

## 2021-01-21 RX ADMIN — PHENYLEPHRINE HYDROCHLORIDE 50 MCG/MIN: 10 INJECTION INTRAVENOUS at 08:16

## 2021-01-21 ASSESSMENT — PULMONARY FUNCTION TESTS
PIF_VALUE: 16
PIF_VALUE: 20
PIF_VALUE: 17
PIF_VALUE: 2
PIF_VALUE: 17
PIF_VALUE: 17
PIF_VALUE: 16
PIF_VALUE: 28
PIF_VALUE: 17
PIF_VALUE: 16
PIF_VALUE: 16
PIF_VALUE: 15
PIF_VALUE: 17
PIF_VALUE: 16
PIF_VALUE: 17
PIF_VALUE: 16
PIF_VALUE: 17
PIF_VALUE: 17
PIF_VALUE: 16
PIF_VALUE: 20
PIF_VALUE: 0
PIF_VALUE: 17
PIF_VALUE: 16
PIF_VALUE: 0
PIF_VALUE: 17
PIF_VALUE: 17
PIF_VALUE: 16
PIF_VALUE: 17
PIF_VALUE: 16
PIF_VALUE: 18
PIF_VALUE: 20
PIF_VALUE: 17
PIF_VALUE: 16
PIF_VALUE: 17
PIF_VALUE: 16
PIF_VALUE: 1
PIF_VALUE: 16
PIF_VALUE: 17
PIF_VALUE: 16
PIF_VALUE: 17
PIF_VALUE: 20
PIF_VALUE: 17
PIF_VALUE: 16
PIF_VALUE: 24
PIF_VALUE: 16
PIF_VALUE: 17
PIF_VALUE: 18
PIF_VALUE: 16
PIF_VALUE: 26
PIF_VALUE: 17
PIF_VALUE: 16
PIF_VALUE: 17
PIF_VALUE: 16
PIF_VALUE: 16
PIF_VALUE: 1
PIF_VALUE: 17
PIF_VALUE: 16
PIF_VALUE: 17
PIF_VALUE: 16
PIF_VALUE: 1
PIF_VALUE: 16
PIF_VALUE: 17
PIF_VALUE: 16
PIF_VALUE: 19
PIF_VALUE: 17
PIF_VALUE: 17
PIF_VALUE: 16
PIF_VALUE: 17
PIF_VALUE: 2
PIF_VALUE: 18
PIF_VALUE: 17
PIF_VALUE: 19
PIF_VALUE: 17
PIF_VALUE: 16
PIF_VALUE: 17

## 2021-01-21 ASSESSMENT — ENCOUNTER SYMPTOMS
COLOR CHANGE: 0
BLOOD IN STOOL: 0
CHEST TIGHTNESS: 0
PHOTOPHOBIA: 0
BACK PAIN: 0
WHEEZING: 0
EYE PAIN: 0
SHORTNESS OF BREATH: 1
NAUSEA: 0
DIARRHEA: 0
COUGH: 0
VOMITING: 0
ABDOMINAL PAIN: 0
CONSTIPATION: 0

## 2021-01-21 ASSESSMENT — PAIN DESCRIPTION - LOCATION: LOCATION: THROAT

## 2021-01-21 ASSESSMENT — PAIN DESCRIPTION - ORIENTATION: ORIENTATION: MID

## 2021-01-21 ASSESSMENT — PAIN SCALES - GENERAL: PAINLEVEL_OUTOF10: 0

## 2021-01-21 ASSESSMENT — PAIN DESCRIPTION - DESCRIPTORS: DESCRIPTORS: ACHING;DISCOMFORT

## 2021-01-21 NOTE — ANESTHESIA POSTPROCEDURE EVALUATION
Department of Anesthesiology  Postprocedure Note    Patient: Werner Chavez  MRN: [id number]  YOB: 1940  Date of evaluation: 1/21/2021  Time:  10:48 AM     Procedure Summary     Date: 01/21/21 Room / Location: [facility] Cath Lab    Anesthesia Start: 4979 Anesthesia Stop:     Procedure: [facility] EP AFIB ABLATION W ANESTH Diagnosis:       Other persistent atrial fibrillation      S/P ablation of atrial fibrillation    Scheduled Providers:  Responsible Provider: Taisha Lopez DO    Anesthesia Type: general ASA Status: 3          Anesthesia Type: general    Ken Phase I: Ken Score: 8    Ken Phase II:      Last vitals: Reviewed and per EMR flowsheets.        Anesthesia Post Evaluation    Patient location during evaluation: PACU  Patient participation: complete - patient participated  Level of consciousness: awake and alert  Pain score: 0  Airway patency: patent  Nausea & Vomiting: no nausea and no vomiting  Complications: no  Cardiovascular status: hemodynamically stable and blood pressure returned to baseline  Respiratory status: acceptable, spontaneous ventilation, nonlabored ventilation and face mask  Hydration status: stable

## 2021-01-21 NOTE — PROGRESS NOTES
Bilateral figure of eight suture removed from bilateral groins. No bleeding noted. No hematoma. New dressings applied.

## 2021-01-21 NOTE — H&P
[facility] h&P  Note      Reason for consultation:  Atrial fibrillation and orthostatic hypotension    Chief complaint :  Shortness of breath on exertion, Dizziness    Referring physician: [facility]      Primary care physician: No primary care provider on file. History of Present Illness: Today visit (01/21/21)    Patient here for atrial fib / flutter ablation. No change in H&P noted from previous clinic visit. Previous visit:   Patient is a 74yr old male with hx of PAF, LUCILLE on CPAP, HTN, HLD, CAD referred by  for atrial fibrillation management and syncope management. Patient report shortness of breath with exertion and this has been ongoing for several months now. Patient reported that he also has dizziness when going from sitting to standing position. This has been going on for some time too. He is on medication but still has. Recently he was stopped of norvasc. Patient denies chest pain. Patient does have palpitations. He was started on amiodarone. Patient also reports balance issues at times. Patient is taking antocoagulation. Patient denies fever or chills. Patient feels tired and weakness. No chief complaint on file. Location, Quality, severity, Timing, Duration, context, modifying factors, associated signs and symptoms      Pastmedical history:   Past Medical History:   Diagnosis Date    Arthritis     CAD (coronary artery disease)     Hyperlipidemia     Hypertension        Surgical history :   Past Surgical History:   Procedure Laterality Date    COLONOSCOPY  2/4/13    Diverticulosis    CYST REMOVAL      baker cyst on right knee    HERNIA REPAIR      PTCA         Family history: No family history on file. Social history :  reports that he has never smoked. He has never used smokeless tobacco. He reports current alcohol use. He reports that he does not use drugs.     No Known Allergies    No current facility-administered medications on file prior to encounter. Current Outpatient Medications on File Prior to Encounter   Medication Sig Dispense Refill    metoprolol tartrate (LOPRESSOR) 50 MG tablet Take 1 tablet by mouth 2 times daily 60 tablet 3    furosemide (LASIX) 20 MG tablet Take 20 mg by mouth daily      midodrine (PROAMATINE) 5 MG tablet Take 10 mg by mouth 3 times daily      atorvastatin (LIPITOR) 40 MG tablet Take 40 mg by mouth daily      amiodarone (CORDARONE) 200 MG tablet Take 1 tablet by mouth daily 30 tablet 3    Multiple Vitamins-Minerals (THERAPEUTIC MULTIVITAMIN-MINERALS) tablet Take 1 tablet by mouth daily      tamsulosin (FLOMAX) 0.4 MG capsule Take 0.8 mg by mouth daily      albuterol sulfate  (90 Base) MCG/ACT inhaler Inhale 2 puffs into the lungs every 6 hours as needed for Wheezing 1 Inhaler 5       Review of Systems:   Review of Systems   Constitutional: Positive for fatigue. Negative for activity change, chills and fever. HENT: Negative for congestion, ear pain and tinnitus. Eyes: Negative for photophobia, pain and visual disturbance. Respiratory: Positive for shortness of breath. Negative for cough, chest tightness and wheezing. Cardiovascular: Positive for palpitations. Negative for chest pain and leg swelling. Gastrointestinal: Negative for abdominal pain, blood in stool, constipation, diarrhea, nausea and vomiting. Endocrine: Negative for cold intolerance and heat intolerance. Genitourinary: Negative for dysuria, flank pain and hematuria. Musculoskeletal: Positive for arthralgias. Negative for back pain, myalgias and neck stiffness. Skin: Negative for color change and rash. Allergic/Immunologic: Negative for food allergies. Neurological: Positive for dizziness and light-headedness. Negative for syncope, numbness and headaches. Hematological: Does not bruise/bleed easily.    Psychiatric/Behavioral: Negative for agitation, behavioral problems and confusion. Examination:      Vitals:    01/21/21 0613   Resp: 14   Temp: 97.1 °F (36.2 °C)   TempSrc: Temporal   SpO2: 97%   Weight: 178 lb (80.7 kg)   Height: 5' 10\" (1.778 m)        Body mass index is 25.54 kg/m². Physical Exam  Constitutional:       General: He is not in acute distress. Appearance: He is well-developed. HENT:      Head: Normocephalic and atraumatic. Eyes:      Pupils: Pupils are equal, round, and reactive to light. Neck:      Musculoskeletal: Normal range of motion. Vascular: No JVD. Cardiovascular:      Rate and Rhythm: Normal rate and regular rhythm. Heart sounds: Murmur (grade 2/6 systolic murmur) present. No friction rub. Pulmonary:      Effort: Pulmonary effort is normal. No respiratory distress. Breath sounds: Normal breath sounds. No wheezing or rales. Abdominal:      General: Bowel sounds are normal. There is no distension. Palpations: Abdomen is soft. Tenderness: There is no abdominal tenderness. Musculoskeletal:         General: No tenderness. Skin:     General: Skin is warm and dry. Neurological:      Mental Status: He is alert and oriented to person, place, and time. Cranial Nerves: No cranial nerve deficit.                CBC:   Lab Results   Component Value Date    WBC 5.6 01/14/2021    HGB 13.6 01/14/2021    HCT 43.3 01/14/2021     01/14/2021     Lipids:  Lab Results   Component Value Date    CHOL 151 06/26/2013    TRIG 143 06/26/2013    HDL 56 (L) 06/26/2013    LDLCALC 94 01/11/2011    LDLDIRECT 65 06/26/2013     PT/INR:   Lab Results   Component Value Date    INR 1.19 01/14/2021        BMP:    Lab Results   Component Value Date     01/14/2021    K 4.4 01/14/2021     01/14/2021    CO2 25 01/14/2021    BUN 20 01/14/2021     CMP:   Lab Results   Component Value Date    AST 17 12/17/2020    PROT 5.7 (L) 12/17/2020    BILITOT 0.4 12/17/2020    ALKPHOS 80 12/17/2020     TSH:  No results found for: TSH    EKGINTERPRETATION - EKG Interpretation:  SINUS RHYTHM      IMPRESSION / RECOMMENDATIONS:     1. PAF  2. Orthostatic hypotension  3. HTN  4. HLD  5. CAD  6. LUCILLE    Patient is having PAF with RVR episodes back on amiodarone. Patient reports some Balance issues with it. Patient also has episodes of orthostatic hypotension. Patient was on amlodipine and tamsulosin     Tamsulosin could be contributing for orthostatic hypotension now the dose is also increased for relieving symptoms. Still continues to have it I think the best thing is to discuss with urology to see if he can have surgery for BPH. And also may be change tamsulosin to finasteride. But finasteride takes 2 weeks to act at least.    Patient for orthostatic hypotension is on midodrine and Florinef and still continues to have it. Medication adjustment needs to be done in this patient so best is to discuss with urology    Meanwhile for A. fib we will schedule for A. fib ablation given that symptomatic atrial fibrillation. Patient also needs to come off amiodarone eventually as he is having some issues with amiodarone could be balance issues from the amiodarone. Discussed risk with the procedure about A. fib ablation and patient agrees with the plan. Thanks again for allowing me to participate in care of this patient. Please call me if you have any questions. With best regards. Juaquin Arnett MD, 1/21/2021 7:42 AM     Please note this report has been partially produced using speech recognition software and may contain errors related to that system including errors in grammar, punctuation, and spelling, as well as words and phrases that may be inappropriate. If there are any questions or concerns please feel free to contact the dictating provider for clarification.

## 2021-01-21 NOTE — ADDENDUM NOTE
Addendum  created 01/21/21 1320 by TYRA Galicia - CRNA    Child order released for a procedure order, Clinical Note Signed, Intraprocedure Blocks edited

## 2021-01-21 NOTE — OP NOTE
on the chart. A timeout protocol was completed to identify the patient and the procedure being performed. MICHAEL was performed prior to the ablation procedure to assure the absence of any intracardiac thrombi or other contraindications to proceeding with ablation. The full MICHAEL report is dictated separately. The patient was prepped and draped in a sterile fashion. Lidocaine was administered to the right and left groin. Using a modified Seldinger technique, venous access was obtained and sheaths were placed as detailed below. Catheters were then placed under fluoroscopic guidance as detailed below. SHEATH AND CATHETER PLACEMENT:  Location  Sheath  Catheter  site    Left femoral vein  7F  Biosense Leblanc 6F Decapolar catheter  Coronary sinus    Left femoral vein  11F  Quadripolar catheter      Intracardiac Echo with sound technology  Right atrium    Right femoral vein  8F Short and SRO sheath D-F curve Smart touch irrigated [employer] Ablation catheter      Pentaray catheter  Pulmonay veins, Left atrium, RA, RV, Cavotricuspid isthmus      LA mapping    Right femoral vein  8F Short  Quadripolar catheter  RV      A small caliber arterial line in the radial artery was obtained by the anesthesia service. Comprehensive EP study    Patient presented in sinus bradycardia HR between 40-50's so we performed Comprehensive EP study    DC: 127 ms  QRS: 100 ms  QT: 480 ms    AH : 70 ms  HV : 43 ms    Sinus node function is Abnormal. Both SNRT (2700ms) and Corrected SNRT (1400ms)  was abnormal. We had to slow lower the HR and then patient came to baseline. Patient case metoprolol this morning and also under sedation he could have sinus node function abnormally. He was also on neosynephrine post sedation that could cause bradycardia too. We will try to reassess once patient is awake and see if he is having symptomatic bradycardia episodes prior to considering pacemaker.     AVWCL : 430 ms  VAWCL: no VA at 600 ms    AVNERP : 600 / 370 ms  AERP: 600 / 270 ms    Arrhythmia induction    Programmed stimulation was performed. Short atrial runs of fib/ flutter were induced but nothing sustained. So we decided to proceed with PV isolation and CTI ablation. Intracardiac echocardiography:     A baseline intracardiac echocardiography study was performed. During the procedure, intracardiac echocardiography was used for transseptal puncture, monitoring of catheter placement during radiofrequency lesions, and monitoring for complications. Three-dimensional electroanatomical mapping:    A three-dimensional electroanatomical mapping: Using the POPSUGAR CARTO 3 three-dimensional electroanatomical map, a shell map of the left atrium and the pulmonary veins was created by dragging the ablation catheter and the pentaray catheter in different areas of the left atrium and in the pulmonary veins. The map was used for aiding the navigation process and to reduce fluoroscopy use. It was also used to guide ablation lesions and to radha those lesions. Trans-septal puncture:     During transseptal access, she was in sinus bradycardia,  an 0.032 inch J-tipped guidewire was advanced through the 8-Kenyan sheath in the right femoral vein under fluoroscopic guidance. An SRO sheath and dilator were advanced over the guidewire into the superior vena cava under fluoroscopic guidance. The guidewire was removed. A [place] 1 needle was advanced through the dilator until the tip was slightly behind the tip of the dilator. This system was withdrawn until the apparatus was in contact with the foramen ovale. Transseptal access was obtained. Under fluoroscopic, hemodynamic ((Mean RA pressure 5 cm and mean LA pressure on transseptal was 12 mm) and ultrasound guidance, the left atrium was cannulated and sheath advanced. The dilator and needle were removed. Intracardiac echocardiography demonstrated no acute complications.     Heparin was block. Both entrance and exit block were confirmed. Adenosine was not given as baseline bradycardia and also BP was softer needing neosynephrine. Typical atrial flutter ablation:    Patient was previously noted to have typical atrial flutter too so we decided to perform cavotricuspid isthmus ablation at the same time. SRO sheath and Ablation catheter was then was advanced into position along the ventricular septal aspect of the cavotricuspid isthmus under fluoroscopic guidance. Ablation:  Radiofrequency lesions were delivered in a linear fashion until bidirectional block was achieved - 35W was  for 30-45 second per lesion with assessment of signal reduction at the site of ablation, impedance drop, Visitag ablation mapping and also FTI of around 400 -and Ablation index of upto 550 . Patient was paced from proximal Coronary sinus during ablation and isthmus block was achieve while ablation. Pacing was performed from the proximal CS and lateral right atrium respectively to confirm bidirectional block. Bidirectional block was confirmed. No arrhythmia was induced post ablation    Intracardiac echocardiography was used to document the absence of any significant effusion or any other complication. Catheters were removed under fluoroscopic guidance. The patient was extubated and transferred to recovery. Sheaths were pulled and figure of eight sutures ( to be removed in recovery) performed to achieve hemostasis in recovery. There was no bleeding noted from any of the access sites. SUMMARY:    Successful isolation of the pulmonary veins for ablation of paroxysmal atrial fibrillation  Successful ablation of cavo-tricuspid isthmus for typical atrial flutter ablation    RECOMMENDATIONS:  1. Admit to the floor  2. Call [facility] [place] if the patient becomes hypotensive, febrile, unstable or if there is a change in mental status. 3.  Resume anticoagulation as recommended.    4. Bed rest x6 hours with legs straight after sheaths are removed. 5. Continue current medications. 6. Follow up in the electrophysiology clinic in three weeks.

## 2021-01-21 NOTE — PROCEDURES
Beryle Melbourne   68 y.o., male  1940      1/21/2021    Attending: Jose Houston MD    Sedation : Anesthesia                        Indication:         Pre-atrial fibrillation ablation                                                                                                                                                 After obtaining the informed cosent. Pt brought to EP lab. Sedation per anesthesia . After proper sedation MICHAEL performed. US Doppler was used to assess abnormalities where appropriate. Post procedure pt is stable. Findings:  LV=  Appears normal LVEF  LA=  normal  HARSHAL= NO CLOTS  RV= normal  RA=  normal  IAS= Normal  Bubble study=not donefor PFO or ASD  AV= tricuspid, no significant regurgitation or stenosis  MV=mild regurgitation, no stenosis  TV=trivial regurgitation  PV= no significant regurgitation,   Aorta=mild stable plaque noted  PUL RAFA FLOW=systolic blunting noted.      Impression:  No HARSHAL clot    Plan:  Proceed with ablation

## 2021-01-21 NOTE — PROGRESS NOTES
1042- pt rec'd from the E-P lab and placed on pacu monitor with alarms on. Report rec'd from Shae DIXON and E-P nurse. Pt arousing and following commands. Oriented to surroundings. resps even and unlabored. natalya groin sites have sutures in place; sites are without edema or drainage. Sutures to be removed later today. BPPP. NSR noted on monitor. Pt denies pain. 1110- echo being done per orders. 1115- ekg being done per orders  1130- waiting on room assignment. Pt denies pain. 1230- resting with eyes closed. Appears to be sleeping. resps even and unlabored. 1250- E. Emmanuel Hathaway NP at bedside and removed natalya groin sutures without difficulty. New dressings applied. Groins soft upon palpation. Pt denies any needs. BPPP.  1324- pt transferred to room 3110 via bed and monitor without incident. All valuables transferred with pt.

## 2021-01-22 ENCOUNTER — TELEPHONE (OUTPATIENT)
Dept: CARDIOLOGY CLINIC | Age: 81
End: 2021-01-22

## 2021-01-22 VITALS
RESPIRATION RATE: 24 BRPM | OXYGEN SATURATION: 95 % | DIASTOLIC BLOOD PRESSURE: 58 MMHG | TEMPERATURE: 97.8 F | SYSTOLIC BLOOD PRESSURE: 105 MMHG | WEIGHT: 178 LBS | HEART RATE: 72 BPM | HEIGHT: 70 IN | BODY MASS INDEX: 25.48 KG/M2

## 2021-01-22 LAB
ALBUMIN SERPL-MCNC: 3 GM/DL (ref 3.4–5)
ANION GAP SERPL CALCULATED.3IONS-SCNC: 9 MMOL/L (ref 4–16)
BUN BLDV-MCNC: 18 MG/DL (ref 6–23)
CALCIUM SERPL-MCNC: 8 MG/DL (ref 8.3–10.6)
CHLORIDE BLD-SCNC: 107 MMOL/L (ref 99–110)
CO2: 23 MMOL/L (ref 21–32)
CREAT SERPL-MCNC: 1.4 MG/DL (ref 0.9–1.3)
GFR AFRICAN AMERICAN: 59 ML/MIN/1.73M2
GFR NON-AFRICAN AMERICAN: 49 ML/MIN/1.73M2
GLUCOSE BLD-MCNC: 106 MG/DL (ref 70–99)
HCT VFR BLD CALC: 39.1 % (ref 42–52)
HEMOGLOBIN: 12.4 GM/DL (ref 13.5–18)
MAGNESIUM: 2 MG/DL (ref 1.8–2.4)
MCH RBC QN AUTO: 28.9 PG (ref 27–31)
MCHC RBC AUTO-ENTMCNC: 31.7 % (ref 32–36)
MCV RBC AUTO: 91.1 FL (ref 78–100)
PDW BLD-RTO: 13.1 % (ref 11.7–14.9)
PHOSPHORUS: 3.1 MG/DL (ref 2.5–4.9)
PLATELET # BLD: 139 K/CU MM (ref 140–440)
PMV BLD AUTO: 10.9 FL (ref 7.5–11.1)
POTASSIUM SERPL-SCNC: 3.7 MMOL/L (ref 3.5–5.1)
RBC # BLD: 4.29 M/CU MM (ref 4.6–6.2)
SODIUM BLD-SCNC: 139 MMOL/L (ref 135–145)
WBC # BLD: 8.9 K/CU MM (ref 4–10.5)

## 2021-01-22 PROCEDURE — 6370000000 HC RX 637 (ALT 250 FOR IP): Performed by: INTERNAL MEDICINE

## 2021-01-22 PROCEDURE — 94761 N-INVAS EAR/PLS OXIMETRY MLT: CPT

## 2021-01-22 PROCEDURE — 99217 PR OBSERVATION CARE DISCHARGE MANAGEMENT: CPT | Performed by: NURSE PRACTITIONER

## 2021-01-22 PROCEDURE — 6370000000 HC RX 637 (ALT 250 FOR IP): Performed by: NURSE PRACTITIONER

## 2021-01-22 PROCEDURE — 84100 ASSAY OF PHOSPHORUS: CPT

## 2021-01-22 PROCEDURE — 36415 COLL VENOUS BLD VENIPUNCTURE: CPT

## 2021-01-22 PROCEDURE — 85027 COMPLETE CBC AUTOMATED: CPT

## 2021-01-22 PROCEDURE — 80069 RENAL FUNCTION PANEL: CPT

## 2021-01-22 PROCEDURE — 2580000003 HC RX 258: Performed by: INTERNAL MEDICINE

## 2021-01-22 PROCEDURE — 83735 ASSAY OF MAGNESIUM: CPT

## 2021-01-22 RX ORDER — PANTOPRAZOLE SODIUM 40 MG/1
40 TABLET, DELAYED RELEASE ORAL DAILY
Qty: 30 TABLET | Refills: 0 | Status: SHIPPED | OUTPATIENT
Start: 2021-01-22 | End: 2021-04-28

## 2021-01-22 RX ADMIN — PANTOPRAZOLE SODIUM 40 MG: 40 TABLET, DELAYED RELEASE ORAL at 09:13

## 2021-01-22 RX ADMIN — FUROSEMIDE 20 MG: 20 TABLET ORAL at 09:11

## 2021-01-22 RX ADMIN — MIDODRINE HYDROCHLORIDE 10 MG: 5 TABLET ORAL at 09:12

## 2021-01-22 RX ADMIN — MULTIPLE VITAMINS W/ MINERALS TAB 1 TABLET: TAB at 09:11

## 2021-01-22 RX ADMIN — APIXABAN 5 MG: 5 TABLET, FILM COATED ORAL at 11:17

## 2021-01-22 RX ADMIN — SODIUM CHLORIDE, PRESERVATIVE FREE 10 ML: 5 INJECTION INTRAVENOUS at 09:12

## 2021-01-22 RX ADMIN — BENZOCAINE AND MENTHOL 1 LOZENGE: 15; 3.6 LOZENGE ORAL at 01:12

## 2021-01-22 RX ADMIN — ATORVASTATIN CALCIUM 40 MG: 40 TABLET, FILM COATED ORAL at 09:12

## 2021-01-22 RX ADMIN — AMIODARONE HYDROCHLORIDE 200 MG: 200 TABLET ORAL at 09:12

## 2021-01-22 ASSESSMENT — PAIN DESCRIPTION - FREQUENCY: FREQUENCY: CONTINUOUS

## 2021-01-22 ASSESSMENT — PAIN DESCRIPTION - PAIN TYPE: TYPE: ACUTE PAIN

## 2021-01-22 ASSESSMENT — PAIN SCALES - GENERAL: PAINLEVEL_OUTOF10: 0

## 2021-01-22 ASSESSMENT — PAIN DESCRIPTION - DESCRIPTORS: DESCRIPTORS: ACHING;DISCOMFORT

## 2021-01-22 ASSESSMENT — PAIN DESCRIPTION - ORIENTATION: ORIENTATION: MID

## 2021-01-22 NOTE — PROGRESS NOTES
Patient states he is on eliquis twice a day and it is not on his discharge paperwork. Alejandra Parker paged.

## 2021-01-22 NOTE — DISCHARGE SUMMARY
Ht 5' 10\" (1.778 m)   Wt 178 lb (80.7 kg)   SpO2 95%   BMI 25.54 kg/m²   General appearance: alert, appears stated age and cooperative  Head: Normocephalic, without obvious abnormality, atraumatic  Lungs: clear to auscultation bilaterally  Heart: regular rate and rhythm, S1, S2 normal, grade 2/6 systolic murmur, No click, rub or gallop appreciated  Extremities: extremities normal, atraumatic, no cyanosis or edema  Pulses: 2+ and symmetric  Skin: Skin color, texture, turgor normal. No rashes or lesions. Bilateral femoral groin sites soft, nontender. No hematoma.    Neurologic: Grossly normal    Disposition:   home    Signed:  Elenora Cockayne  1/22/2021, 11:08 AM

## 2021-01-25 ENCOUNTER — TELEPHONE (OUTPATIENT)
Dept: CARDIOLOGY CLINIC | Age: 81
End: 2021-01-25

## 2021-01-25 LAB
EKG ATRIAL RATE: 68 BPM
EKG DIAGNOSIS: NORMAL
EKG P AXIS: 13 DEGREES
EKG P-R INTERVAL: 150 MS
EKG Q-T INTERVAL: 448 MS
EKG QRS DURATION: 90 MS
EKG QTC CALCULATION (BAZETT): 476 MS
EKG R AXIS: -34 DEGREES
EKG T AXIS: -37 DEGREES
EKG VENTRICULAR RATE: 68 BPM

## 2021-01-25 NOTE — TELEPHONE ENCOUNTER
Spoke with Lamonte Toribio CNP, patient is taking Midodrine per Perfecto Cram for orthostatic hypotension. Patient needs to contact primary cardiologist to see if they would like him to hold it. Patient is already scheduled to see Lamonte Toribio CNP on 1/28 for one week post ablation OV. Advised patient and he voiced understanding.

## 2021-01-28 ENCOUNTER — OFFICE VISIT (OUTPATIENT)
Dept: CARDIOLOGY CLINIC | Age: 81
End: 2021-01-28
Payer: MEDICARE

## 2021-01-28 VITALS
HEART RATE: 72 BPM | BODY MASS INDEX: 25.57 KG/M2 | WEIGHT: 178.6 LBS | HEIGHT: 70 IN | SYSTOLIC BLOOD PRESSURE: 110 MMHG | DIASTOLIC BLOOD PRESSURE: 68 MMHG

## 2021-01-28 DIAGNOSIS — I48.91 ATRIAL FIBRILLATION, UNSPECIFIED TYPE (HCC): Primary | ICD-10-CM

## 2021-01-28 PROCEDURE — 4040F PNEUMOC VAC/ADMIN/RCVD: CPT | Performed by: NURSE PRACTITIONER

## 2021-01-28 PROCEDURE — 1123F ACP DISCUSS/DSCN MKR DOCD: CPT | Performed by: NURSE PRACTITIONER

## 2021-01-28 PROCEDURE — 1036F TOBACCO NON-USER: CPT | Performed by: NURSE PRACTITIONER

## 2021-01-28 PROCEDURE — G8427 DOCREV CUR MEDS BY ELIG CLIN: HCPCS | Performed by: NURSE PRACTITIONER

## 2021-01-28 PROCEDURE — 99213 OFFICE O/P EST LOW 20 MIN: CPT | Performed by: NURSE PRACTITIONER

## 2021-01-28 PROCEDURE — G8417 CALC BMI ABV UP PARAM F/U: HCPCS | Performed by: NURSE PRACTITIONER

## 2021-01-28 PROCEDURE — G8484 FLU IMMUNIZE NO ADMIN: HCPCS | Performed by: NURSE PRACTITIONER

## 2021-01-28 PROCEDURE — 93000 ELECTROCARDIOGRAM COMPLETE: CPT | Performed by: NURSE PRACTITIONER

## 2021-01-28 ASSESSMENT — ENCOUNTER SYMPTOMS
CHEST TIGHTNESS: 0
CONSTIPATION: 0
DIARRHEA: 0
BLOOD IN STOOL: 0
WHEEZING: 0
ROS SKIN COMMENTS: BRUISING
COUGH: 0
PHOTOPHOBIA: 0
ABDOMINAL PAIN: 0
NAUSEA: 0
VOMITING: 0
COLOR CHANGE: 0
SHORTNESS OF BREATH: 0
BACK PAIN: 0
EYE PAIN: 0

## 2021-01-28 NOTE — PROGRESS NOTES
[facility] Electrophysiology Note      Reason for consultation:  Atrial fibrillation and orthostatic hypotension    Chief complaint : Patient here for 1 week follow-up status post of paroxysmal atrial fibrillation and typical atrial flutter      Referring physician: Irish Rocha      Primary care physician: No primary care provider on file. History of Present Illness: This visit 1/28/2021  Patient is here today for 1 week follow-up status post ablation of atrial fibrillation and typical atrial flutter. He denies palpitations or tachycardia since the procedure. He states that he does have bruising in his bilateral groin sites. He states that they are not tender. He also reports that he has had some fluctuating blood pressures. He is on midodrine and is following with Dr. Chichi Geller. Previous visit    Patient here for atrial fib / flutter ablation. No change in H&P noted from previous clinic visit. Previous visit:   Patient is a 74yr old male with hx of PAF, LUCILLE on CPAP, HTN, HLD, CAD referred by  for atrial fibrillation management and syncope management. Patient report shortness of breath with exertion and this has been ongoing for several months now. Patient reported that he also has dizziness when going from sitting to standing position. This has been going on for some time too. He is on medication but still has. Recently he was stopped of norvasc. Patient denies chest pain. Patient does have palpitations. He was started on amiodarone. Patient also reports balance issues at times. Patient is taking antocoagulation. Patient denies fever or chills. Patient feels tired and weakness.      Chief Complaint   Patient presents with    Hyperlipidemia    Hypertension    Coronary Artery Disease         Location, Quality, severity, Timing, Duration, context, modifying factors, associated signs and symptoms      Pastmedical history:   Past Medical History: Diagnosis Date    Arthritis     CAD (coronary artery disease)     Hyperlipidemia     Hypertension     S/P ablation of atrial fibrillation 01/21/2021    Atrial fibrillation and atrial flutter ablation by Dr. Irish Laws. Surgical history :   Past Surgical History:   Procedure Laterality Date    COLONOSCOPY  2/4/13    Diverticulosis    CYST REMOVAL      baker cyst on right knee    HERNIA REPAIR      PTCA         Family history: History reviewed. No pertinent family history. Social history :  reports that he has never smoked. He has never used smokeless tobacco. He reports current alcohol use. He reports that he does not use drugs. No Known Allergies    Current Outpatient Medications on File Prior to Visit   Medication Sig Dispense Refill    pantoprazole (PROTONIX) 40 MG tablet Take 1 tablet by mouth daily 30 tablet 0    benzocaine-menthol (CEPACOL SORE THROAT) 15-3.6 MG lozenge Take 1 lozenge by mouth every 2 hours as needed for Sore Throat 50 lozenge 0    apixaban (ELIQUIS) 5 MG TABS tablet Take 1 tablet by mouth 2 times daily 60 tablet 0    furosemide (LASIX) 20 MG tablet Take 20 mg by mouth daily      midodrine (PROAMATINE) 5 MG tablet Take 5 mg by mouth 3 times daily       tamsulosin (FLOMAX) 0.4 MG capsule Take 0.8 mg by mouth daily      atorvastatin (LIPITOR) 40 MG tablet Take 40 mg by mouth daily      albuterol sulfate  (90 Base) MCG/ACT inhaler Inhale 2 puffs into the lungs every 6 hours as needed for Wheezing 1 Inhaler 5    amiodarone (CORDARONE) 200 MG tablet Take 1 tablet by mouth daily 30 tablet 3    Multiple Vitamins-Minerals (THERAPEUTIC MULTIVITAMIN-MINERALS) tablet Take 1 tablet by mouth daily       No current facility-administered medications on file prior to visit. Review of Systems:   Review of Systems   Constitutional: Negative for activity change, chills, fatigue and fever. HENT: Negative for congestion, ear pain and tinnitus.     Eyes: Negative for bilateral groins  I assessed bilateral groins and there is healing bruises noted  Sites are both soft and nontender. No hematoma noted. I told him that bruising would improve. He voiced understanding    EKG obtained patient in sinus rhythm heart rate 75. Continue amiodarone 200 mg daily    CHADS2 score 4- 1 point hypertension, 2 points for age, 1 point for CAD  Continue anticoagulation    Vitals:    01/28/21 1050   BP: 110/68   Pulse: 72     Blood pressure is stable. Continue current medical management. Patient to follow with Dr. Mary Mendez for blood pressure. Patient to follow-up with me in 1 month      Thanks again for allowing me to participate in care of this patient. Please call me if you have any questions. With best regards. TYRA Singh - CNP, 1/28/2021 11:15 AM     Please note this report has been partially produced using speech recognition software and may contain errors related to that system including errors in grammar, punctuation, and spelling, as well as words and phrases that may be inappropriate. If there are any questions or concerns please feel free to contact the dictating provider for clarification.

## 2021-01-28 NOTE — LETTER
Jorgito Arndt  1940  [id number]    Have you had any Chest Pain that is not new? - No          Have you had any Shortness of Breath - No      Have you had any dizziness - No  ?     Have you had any palpitations that are not new?  - No      Do you have any edema - swelling in No        Do you have a surgery or procedure scheduled in the near future - No

## 2021-01-28 NOTE — PATIENT INSTRUCTIONS
Please be informed that if you contact our office outside of normal business hours the physician on call cannot help with any scheduling or rescheduling issues, procedure instruction questions or any type of medication issue. We advise you for any urgent/emergency that you go to the nearest emergency room!     PLEASE CALL OUR OFFICE DURING NORMAL BUSINESS HOURS    Monday - Friday   8 am to 5 pm    [place]: [address]: [phone]    [place]:  [phone]

## 2021-02-09 ENCOUNTER — TELEPHONE (OUTPATIENT)
Dept: CARDIOLOGY CLINIC | Age: 81
End: 2021-02-09

## 2021-02-25 ENCOUNTER — OFFICE VISIT (OUTPATIENT)
Dept: CARDIOLOGY CLINIC | Age: 81
End: 2021-02-25
Payer: MEDICARE

## 2021-02-25 VITALS
BODY MASS INDEX: 25.97 KG/M2 | WEIGHT: 181 LBS | OXYGEN SATURATION: 97 % | TEMPERATURE: 97 F | SYSTOLIC BLOOD PRESSURE: 120 MMHG | HEART RATE: 79 BPM | DIASTOLIC BLOOD PRESSURE: 62 MMHG

## 2021-02-25 DIAGNOSIS — G47.33 OSA ON CPAP: ICD-10-CM

## 2021-02-25 DIAGNOSIS — Z98.890 S/P ABLATION OF ATRIAL FLUTTER: ICD-10-CM

## 2021-02-25 DIAGNOSIS — Z98.890 STATUS POST ABLATION OF ATRIAL FIBRILLATION: Primary | ICD-10-CM

## 2021-02-25 DIAGNOSIS — Z86.79 STATUS POST ABLATION OF ATRIAL FIBRILLATION: Primary | ICD-10-CM

## 2021-02-25 DIAGNOSIS — I10 ESSENTIAL HYPERTENSION: ICD-10-CM

## 2021-02-25 DIAGNOSIS — Z99.89 OSA ON CPAP: ICD-10-CM

## 2021-02-25 DIAGNOSIS — Z86.79 S/P ABLATION OF ATRIAL FLUTTER: ICD-10-CM

## 2021-02-25 PROCEDURE — 1036F TOBACCO NON-USER: CPT | Performed by: NURSE PRACTITIONER

## 2021-02-25 PROCEDURE — 1123F ACP DISCUSS/DSCN MKR DOCD: CPT | Performed by: NURSE PRACTITIONER

## 2021-02-25 PROCEDURE — G8484 FLU IMMUNIZE NO ADMIN: HCPCS | Performed by: NURSE PRACTITIONER

## 2021-02-25 PROCEDURE — 99214 OFFICE O/P EST MOD 30 MIN: CPT | Performed by: NURSE PRACTITIONER

## 2021-02-25 PROCEDURE — G8427 DOCREV CUR MEDS BY ELIG CLIN: HCPCS | Performed by: NURSE PRACTITIONER

## 2021-02-25 PROCEDURE — 4040F PNEUMOC VAC/ADMIN/RCVD: CPT | Performed by: NURSE PRACTITIONER

## 2021-02-25 PROCEDURE — G8417 CALC BMI ABV UP PARAM F/U: HCPCS | Performed by: NURSE PRACTITIONER

## 2021-02-25 PROCEDURE — 93000 ELECTROCARDIOGRAM COMPLETE: CPT | Performed by: NURSE PRACTITIONER

## 2021-02-25 ASSESSMENT — ENCOUNTER SYMPTOMS
BACK PAIN: 0
BLOOD IN STOOL: 0
WHEEZING: 0
COUGH: 0
ABDOMINAL PAIN: 0
CONSTIPATION: 0
ROS SKIN COMMENTS: BRUISING
NAUSEA: 0
VOMITING: 0
DIARRHEA: 0
PHOTOPHOBIA: 0
COLOR CHANGE: 0
SHORTNESS OF BREATH: 0
EYE PAIN: 0
CHEST TIGHTNESS: 0

## 2021-02-25 NOTE — PROGRESS NOTES
started on amiodarone. Patient also reports balance issues at times. Patient is taking antocoagulation. Patient denies fever or chills. Patient feels tired and weakness. Chief Complaint   Patient presents with    [facility]     [facility]         Location, Quality, severity, Timing, Duration, context, modifying factors, associated signs and symptoms      Pastmedical history:   Past Medical History:   Diagnosis Date    Arthritis     CAD (coronary artery disease)     Hyperlipidemia     Hypertension     S/P ablation of atrial fibrillation 01/21/2021    Atrial fibrillation and atrial flutter ablation by Dr. Saniya Rios. Surgical history :   Past Surgical History:   Procedure Laterality Date    COLONOSCOPY  2/4/13    Diverticulosis    CYST REMOVAL      baker cyst on right knee    HERNIA REPAIR      PTCA         Family history: No family history on file. Social history :  reports that he has never smoked. He has never used smokeless tobacco. He reports current alcohol use. He reports that he does not use drugs.     No Known Allergies    Current Outpatient Medications on File Prior to Visit   Medication Sig Dispense Refill    pantoprazole (PROTONIX) 40 MG tablet Take 1 tablet by mouth daily 30 tablet 0    benzocaine-menthol (CEPACOL SORE THROAT) 15-3.6 MG lozenge Take 1 lozenge by mouth every 2 hours as needed for Sore Throat 50 lozenge 0    furosemide (LASIX) 20 MG tablet Take 20 mg by mouth daily      midodrine (PROAMATINE) 5 MG tablet Take 5 mg by mouth 3 times daily       tamsulosin (FLOMAX) 0.4 MG capsule Take 0.8 mg by mouth daily      atorvastatin (LIPITOR) 40 MG tablet Take 40 mg by mouth daily      albuterol sulfate  (90 Base) MCG/ACT inhaler Inhale 2 puffs into the lungs every 6 hours as needed for Wheezing 1 Inhaler 5    amiodarone (CORDARONE) 200 MG tablet Take 1 tablet by mouth daily 30 tablet 3    Multiple Vitamins-Minerals (THERAPEUTIC MULTIVITAMIN-MINERALS) tablet Take 1 tablet by mouth daily       No current facility-administered medications on file prior to visit. Review of Systems:   Review of Systems   Constitutional: Negative for activity change, chills, fatigue and fever. HENT: Negative for congestion, ear pain and tinnitus. Eyes: Negative for photophobia, pain and visual disturbance. Respiratory: Negative for cough, chest tightness, shortness of breath and wheezing. Cardiovascular: Negative for chest pain, palpitations and leg swelling. Gastrointestinal: Negative for abdominal pain, blood in stool, constipation, diarrhea, nausea and vomiting. Endocrine: Negative for cold intolerance and heat intolerance. Genitourinary: Negative for dysuria, flank pain and hematuria. Musculoskeletal: Positive for arthralgias. Negative for back pain, myalgias and neck stiffness. Skin: Negative for color change and rash. bruising   Allergic/Immunologic: Negative for food allergies. Neurological: Negative for dizziness, syncope, light-headedness, numbness and headaches. Hematological: Does not bruise/bleed easily. Psychiatric/Behavioral: Negative for agitation, behavioral problems and confusion. The patient is not nervous/anxious. Examination:      Vitals:    02/25/21 0940   BP: 120/62   Pulse: 79   Temp: 97 °F (36.1 °C)   SpO2: 97%   Weight: 181 lb (82.1 kg)        Body mass index is 25.97 kg/m². Physical Exam  Constitutional:       General: He is not in acute distress. Appearance: Normal appearance. He is well-developed. He is not ill-appearing or diaphoretic. HENT:      Head: Normocephalic and atraumatic. Right Ear: External ear normal.      Left Ear: External ear normal.      Nose: No congestion or rhinorrhea. Mouth/Throat:      Mouth: Mucous membranes are moist.      Pharynx: Oropharynx is clear. No oropharyngeal exudate or posterior oropharyngeal erythema. Eyes:      General:         Right eye: No discharge. Left eye: No discharge. Pupils: Pupils are equal, round, and reactive to light. Neck:      Musculoskeletal: Normal range of motion and neck supple. No muscular tenderness. Vascular: No carotid bruit or JVD. Cardiovascular:      Rate and Rhythm: Normal rate and regular rhythm. Heart sounds: Murmur (grade 2/6 systolic murmur) present. No friction rub. Pulmonary:      Effort: Pulmonary effort is normal. No respiratory distress. Breath sounds: Normal breath sounds. No wheezing, rhonchi or rales. Abdominal:      General: Bowel sounds are normal. There is no distension. Palpations: Abdomen is soft. Tenderness: There is no abdominal tenderness. Musculoskeletal:         General: No tenderness. Right lower leg: No edema. Left lower leg: No edema. Skin:     General: Skin is warm and dry. Capillary Refill: Capillary refill takes less than 2 seconds. Neurological:      Mental Status: He is alert and oriented to person, place, and time. Cranial Nerves: No cranial nerve deficit. Psychiatric:         Mood and Affect: Mood normal.         Behavior: Behavior normal.         Thought Content:  Thought content normal.         Judgment: Judgment normal.         CBC:   Lab Results   Component Value Date    WBC 8.9 01/22/2021    HGB 12.4 01/22/2021    HCT 39.1 01/22/2021     01/22/2021     Lipids:  Lab Results   Component Value Date    CHOL 151 06/26/2013    TRIG 143 06/26/2013    HDL 56 (L) 06/26/2013    LDLCALC 94 01/11/2011    LDLDIRECT 65 06/26/2013     PT/INR:   Lab Results   Component Value Date    INR 1.19 01/14/2021        BMP:    Lab Results   Component Value Date     01/22/2021    K 3.7 01/22/2021     01/22/2021    CO2 23 01/22/2021    BUN 18 01/22/2021     CMP:   Lab Results   Component Value Date    AST 17 12/17/2020    PROT 5.7 (L) 12/17/2020    BILITOT 0.4 12/17/2020    ALKPHOS 80 12/17/2020     TSH:       EKG Interpretation:  SINUS RHYTHM      IMPRESSION / RECOMMENDATIONS:     Status post ablation of atrial fibrillation  Status post ablation of typical atrial flutter  Orthostatic hypotension  Hypertension  LUCILLE on CPAP    Patient here for 1 month follow-up  Reports overall he is doing well  EKG obtained shows sinus rhythm heart rate 68    Continue amiodarone 200 mg daily    CHADS2 score 4- 1 point hypertension, 2 points for age, 1 point for CAD  Continue anticoagulation    Vitals:    02/25/21 0940   BP: 120/62   Pulse: 79   Temp: 97 °F (36.1 °C)   SpO2: 97%     Blood pressure is stable. Continue current medical management. Patient to follow with Dr. Gail Toussaint for blood pressure. He is compliant with his CPAP  Discussed with him importance of wearing CPAP   he voiced understanding    Patient to follow-up with me in 2 month      Thanks again for allowing me to participate in care of this patient. Please call me if you have any questions. With best regards. Susi Vick, TYRA - CNP, 2/25/2021 9:54 AM     Please note this report has been partially produced using speech recognition software and may contain errors related to that system including errors in grammar, punctuation, and spelling, as well as words and phrases that may be inappropriate. If there are any questions or concerns please feel free to contact the dictating provider for clarification.

## 2021-04-28 ENCOUNTER — OFFICE VISIT (OUTPATIENT)
Dept: CARDIOLOGY CLINIC | Age: 81
End: 2021-04-28
Payer: MEDICARE

## 2021-04-28 VITALS
DIASTOLIC BLOOD PRESSURE: 82 MMHG | HEART RATE: 76 BPM | WEIGHT: 177 LBS | SYSTOLIC BLOOD PRESSURE: 122 MMHG | HEIGHT: 70 IN | BODY MASS INDEX: 25.34 KG/M2

## 2021-04-28 DIAGNOSIS — Z86.79 S/P ABLATION OF ATRIAL FIBRILLATION: Primary | ICD-10-CM

## 2021-04-28 DIAGNOSIS — G47.33 OSA ON CPAP: ICD-10-CM

## 2021-04-28 DIAGNOSIS — Z99.89 OSA ON CPAP: ICD-10-CM

## 2021-04-28 DIAGNOSIS — Z86.79 STATUS POST ABLATION OF ATRIAL FLUTTER: ICD-10-CM

## 2021-04-28 DIAGNOSIS — Z98.890 STATUS POST ABLATION OF ATRIAL FLUTTER: ICD-10-CM

## 2021-04-28 DIAGNOSIS — I95.0 IDIOPATHIC HYPOTENSION: ICD-10-CM

## 2021-04-28 DIAGNOSIS — Z98.890 S/P ABLATION OF ATRIAL FIBRILLATION: Primary | ICD-10-CM

## 2021-04-28 PROCEDURE — G8417 CALC BMI ABV UP PARAM F/U: HCPCS | Performed by: NURSE PRACTITIONER

## 2021-04-28 PROCEDURE — 99214 OFFICE O/P EST MOD 30 MIN: CPT | Performed by: NURSE PRACTITIONER

## 2021-04-28 PROCEDURE — 1123F ACP DISCUSS/DSCN MKR DOCD: CPT | Performed by: NURSE PRACTITIONER

## 2021-04-28 PROCEDURE — G8427 DOCREV CUR MEDS BY ELIG CLIN: HCPCS | Performed by: NURSE PRACTITIONER

## 2021-04-28 PROCEDURE — 1036F TOBACCO NON-USER: CPT | Performed by: NURSE PRACTITIONER

## 2021-04-28 PROCEDURE — 93000 ELECTROCARDIOGRAM COMPLETE: CPT | Performed by: NURSE PRACTITIONER

## 2021-04-28 PROCEDURE — 4040F PNEUMOC VAC/ADMIN/RCVD: CPT | Performed by: NURSE PRACTITIONER

## 2021-04-28 ASSESSMENT — ENCOUNTER SYMPTOMS
CHEST TIGHTNESS: 0
BACK PAIN: 0
ABDOMINAL DISTENTION: 0
DIARRHEA: 0
WHEEZING: 0
EYE REDNESS: 0
VOMITING: 0
SINUS PAIN: 0
BLOOD IN STOOL: 0
ABDOMINAL PAIN: 0
COLOR CHANGE: 0
ROS SKIN COMMENTS: BRUISING
SHORTNESS OF BREATH: 0
EYE ITCHING: 0
SINUS PRESSURE: 0
NAUSEA: 0
COUGH: 0
CONSTIPATION: 0

## 2021-04-28 NOTE — PROGRESS NOTES
[facility] Electrophysiology Note      Reason for consultation:  Atrial fibrillation and orthostatic hypotension    Chief complaint: 3 month follow up s/p ablation of atrial fibrillation and atrial flutter    Referring physician: Geneva Hall      Primary care physician: Gabo Carlin MD      History of Present Illness: This visit 4/28/2021  Patient is here today for 3-month follow-up status post ablation of atrial fibrillation and atrial flutter. He denies further episodes of atrial fibrillation atrial flutter. He denies tachycardia or palpitations since the ablation. He reports overall his he is feeling well. He is concerned about his fluctuating blood pressures. States that he is talked to Dr. Karlee Jang about this and Dr. Karlee Jang has done nothing about his blood pressures. Patient denies symptoms of high blood pressure. He continues to drink caffeine. He denies chest pain, shortness of breath, lightheadedness, dizziness, edema or syncope    Previous visit 2/25/2021  Patient is here today for 1 month follow-up status post ablation of atrial fibrillation and atrial flutter. He denies palpitations or tachycardia since the ablation. He states that he has been feeling well. He states that his blood pressure is still fluctuating daily but he is following with Dr. Karlee Jang his cardiologist.  He is taking his medications as prescribed. He is avoiding alcohol and caffeine. He is compliant with his CPAP    Previous visit 1/28/2021  Patient is here today for 1 week follow-up status post ablation of atrial fibrillation and typical atrial flutter. He denies palpitations or tachycardia since the procedure. He states that he does have bruising in his bilateral groin sites. He states that they are not tender. He also reports that he has had some fluctuating blood pressures. He is on midodrine and is following with Dr. Karlee Jang.       Previous visit    Patient here for atrial fib / flutter ablation. No change in H&P noted from previous clinic visit. Previous visit:   Patient is a 74yr old male with hx of PAF, LUCILLE on CPAP, HTN, HLD, CAD referred by  for atrial fibrillation management and syncope management. Patient report shortness of breath with exertion and this has been ongoing for several months now. Patient reported that he also has dizziness when going from sitting to standing position. This has been going on for some time too. He is on medication but still has. Recently he was stopped of norvasc. Patient denies chest pain. Patient does have palpitations. He was started on amiodarone. Patient also reports balance issues at times. Patient is taking antocoagulation. Patient denies fever or chills. Patient feels tired and weakness. Chief Complaint   Patient presents with    Hypertension    Hyperlipidemia    Coronary Artery Disease         Location, Quality, severity, Timing, Duration, context, modifying factors, associated signs and symptoms      Pastmedical history:   Past Medical History:   Diagnosis Date    Arthritis     CAD (coronary artery disease)     Hyperlipidemia     Hypertension     S/P ablation of atrial fibrillation 01/21/2021    Atrial fibrillation and atrial flutter ablation by Dr. Sofia Rodas. Surgical history :   Past Surgical History:   Procedure Laterality Date    COLONOSCOPY  2/4/13    Diverticulosis    CYST REMOVAL      baker cyst on right knee    HERNIA REPAIR      PTCA         Family history: History reviewed. No pertinent family history. Social history :  reports that he has never smoked. He has never used smokeless tobacco. He reports current alcohol use. He reports that he does not use drugs.     No Known Allergies    Current Outpatient Medications on File Prior to Visit   Medication Sig Dispense Refill    benzocaine-menthol (CEPACOL SORE THROAT) 15-3.6 MG lozenge Take 1 lozenge by mouth every 2 hours as needed for Sore Throat 50 lozenge 0    furosemide (LASIX) 20 MG tablet Take 20 mg by mouth daily      midodrine (PROAMATINE) 5 MG tablet Take 5 mg by mouth 3 times daily       tamsulosin (FLOMAX) 0.4 MG capsule Take 0.8 mg by mouth daily      atorvastatin (LIPITOR) 40 MG tablet Take 40 mg by mouth daily      albuterol sulfate  (90 Base) MCG/ACT inhaler Inhale 2 puffs into the lungs every 6 hours as needed for Wheezing 1 Inhaler 5    Multiple Vitamins-Minerals (THERAPEUTIC MULTIVITAMIN-MINERALS) tablet Take 1 tablet by mouth daily       No current facility-administered medications on file prior to visit. Review of Systems:   Review of Systems   Constitutional: Negative for activity change, chills, fatigue and fever. HENT: Negative for congestion, sinus pressure and sinus pain. Eyes: Negative for redness, itching and visual disturbance. Respiratory: Negative for cough, chest tightness, shortness of breath and wheezing. Cardiovascular: Negative for chest pain, palpitations and leg swelling. Gastrointestinal: Negative for abdominal distention, abdominal pain, blood in stool, constipation, diarrhea, nausea and vomiting. Endocrine: Negative for cold intolerance and heat intolerance. Genitourinary: Negative for difficulty urinating, dysuria and hematuria. Musculoskeletal: Positive for arthralgias. Negative for back pain. Skin: Negative for color change, pallor, rash and wound. bruising   Allergic/Immunologic: Negative for food allergies. Neurological: Negative for dizziness, syncope, light-headedness, numbness and headaches. Hematological: Does not bruise/bleed easily. Psychiatric/Behavioral: Negative for agitation, behavioral problems and confusion. The patient is not nervous/anxious. Examination:      Vitals:    04/28/21 1608   BP: 122/82   Pulse: 76   Weight: 177 lb (80.3 kg)   Height: 5' 10\" (1.778 m)        Body mass index is 25.4 kg/m².       Physical Exam  Constitutional: General: He is not in acute distress. Appearance: Normal appearance. He is well-developed. He is not ill-appearing or diaphoretic. HENT:      Head: Normocephalic and atraumatic. Right Ear: External ear normal.      Left Ear: External ear normal.      Nose: No congestion or rhinorrhea. Mouth/Throat:      Mouth: Mucous membranes are moist.      Pharynx: Oropharynx is clear. No oropharyngeal exudate or posterior oropharyngeal erythema. Eyes:      General:         Right eye: No discharge. Left eye: No discharge. Pupils: Pupils are equal, round, and reactive to light. Neck:      Musculoskeletal: Normal range of motion and neck supple. No muscular tenderness. Vascular: No carotid bruit or JVD. Cardiovascular:      Rate and Rhythm: Normal rate and regular rhythm. Heart sounds: Murmur (grade 2/6 systolic murmur) present. No friction rub. Pulmonary:      Effort: Pulmonary effort is normal. No respiratory distress. Breath sounds: Normal breath sounds. No wheezing, rhonchi or rales. Abdominal:      General: Bowel sounds are normal. There is no distension. Palpations: Abdomen is soft. Tenderness: There is no abdominal tenderness. Musculoskeletal:      Right lower leg: No edema. Left lower leg: No edema. Skin:     General: Skin is warm and dry. Capillary Refill: Capillary refill takes less than 2 seconds. Neurological:      Mental Status: He is alert and oriented to person, place, and time. Cranial Nerves: No cranial nerve deficit. Psychiatric:         Mood and Affect: Mood normal.         Behavior: Behavior normal.         Thought Content:  Thought content normal.         Judgment: Judgment normal.         CBC:   Lab Results   Component Value Date    WBC 8.9 01/22/2021    HGB 12.4 01/22/2021    HCT 39.1 01/22/2021     01/22/2021     Lipids:  Lab Results   Component Value Date    CHOL 151 06/26/2013    TRIG 143 06/26/2013    HDL 56 (L) concerns please feel free to contact the dictating provider for clarification.

## 2021-05-11 RX ORDER — APIXABAN 5 MG/1
TABLET, FILM COATED ORAL
Qty: 60 TABLET | Refills: 5 | Status: SHIPPED | OUTPATIENT
Start: 2021-05-11 | End: 2021-10-28

## 2021-06-30 ENCOUNTER — TELEPHONE (OUTPATIENT)
Dept: CARDIOLOGY CLINIC | Age: 81
End: 2021-06-30

## 2021-06-30 NOTE — TELEPHONE ENCOUNTER
Cardiac clearance- by Dr Alex Sanchez- I called Dr Alex Sanchez office, talked to [facility], said Dr Juju Munguia doesn't do  Cardiac clearances , to contact her to see who her regular Cardiologist.

## 2021-07-27 ENCOUNTER — OFFICE VISIT (OUTPATIENT)
Dept: CARDIOLOGY CLINIC | Age: 81
End: 2021-07-27
Payer: MEDICARE

## 2021-07-27 VITALS
HEIGHT: 70 IN | BODY MASS INDEX: 24.85 KG/M2 | SYSTOLIC BLOOD PRESSURE: 118 MMHG | DIASTOLIC BLOOD PRESSURE: 78 MMHG | WEIGHT: 173.6 LBS | HEART RATE: 72 BPM

## 2021-07-27 DIAGNOSIS — G47.33 OSA ON CPAP: ICD-10-CM

## 2021-07-27 DIAGNOSIS — Z86.79 STATUS POST ABLATION OF ATRIAL FLUTTER: ICD-10-CM

## 2021-07-27 DIAGNOSIS — Z99.89 OSA ON CPAP: ICD-10-CM

## 2021-07-27 DIAGNOSIS — Z98.890 S/P ABLATION OF ATRIAL FIBRILLATION: Primary | ICD-10-CM

## 2021-07-27 DIAGNOSIS — L53.9 ERYTHEMA: ICD-10-CM

## 2021-07-27 DIAGNOSIS — Z98.890 STATUS POST ABLATION OF ATRIAL FLUTTER: ICD-10-CM

## 2021-07-27 DIAGNOSIS — Z86.79 S/P ABLATION OF ATRIAL FIBRILLATION: Primary | ICD-10-CM

## 2021-07-27 PROCEDURE — 1036F TOBACCO NON-USER: CPT | Performed by: NURSE PRACTITIONER

## 2021-07-27 PROCEDURE — 1123F ACP DISCUSS/DSCN MKR DOCD: CPT | Performed by: NURSE PRACTITIONER

## 2021-07-27 PROCEDURE — 93000 ELECTROCARDIOGRAM COMPLETE: CPT | Performed by: NURSE PRACTITIONER

## 2021-07-27 PROCEDURE — G8427 DOCREV CUR MEDS BY ELIG CLIN: HCPCS | Performed by: NURSE PRACTITIONER

## 2021-07-27 PROCEDURE — 99214 OFFICE O/P EST MOD 30 MIN: CPT | Performed by: NURSE PRACTITIONER

## 2021-07-27 PROCEDURE — 4040F PNEUMOC VAC/ADMIN/RCVD: CPT | Performed by: NURSE PRACTITIONER

## 2021-07-27 PROCEDURE — G8420 CALC BMI NORM PARAMETERS: HCPCS | Performed by: NURSE PRACTITIONER

## 2021-07-27 ASSESSMENT — ENCOUNTER SYMPTOMS
EYE ITCHING: 0
BACK PAIN: 0
COUGH: 0
SINUS PAIN: 0
EYE REDNESS: 0
DIARRHEA: 0
NAUSEA: 0
COLOR CHANGE: 0
ABDOMINAL PAIN: 0
VOMITING: 0
CONSTIPATION: 0
SINUS PRESSURE: 0
CHEST TIGHTNESS: 0
ROS SKIN COMMENTS: BRUISING
WHEEZING: 0
ABDOMINAL DISTENTION: 0
SHORTNESS OF BREATH: 0
BLOOD IN STOOL: 0

## 2021-07-27 NOTE — LETTER
Latoya Parker  1940  [id number]    Have you had any Chest Pain that is not new? - No       DO EKG IF: Patient has a Heart Rate above 100 or below 40     CAD (Coronary Artery Disease) patient should have one on file every 6 months        Have you had any Shortness of Breath - No    Have you had any dizziness - No       Sitting wait 5 minutes do supine (laying down) wait 5 minutes then do standing - log each in \"vitals\" area in Epic   Be sure to ask what symptoms they are having if they get dizzy while completing ortho stats such as: room spinning, nausea, etc.    Have you had any palpitations that are not new? - No      Do you have any edema - swelling in legs    If Yes - CHECK TO SEE IF THE EDEMA IS PITTING  How long have they been having edema - Months  If Yes - Have they worn compression stockings Yes    Vein \"LEG PROBLEM Questionnaire\"  1. Do you have prominent leg veins? No   2. Do you have any skin discoloration? Yes  3. Do you have any healed or active sores? No  4. Do you have swelling of the legs? Yes  5. Do you have a family history of varicose veins? No  6. Does your profession involve pro-longed        standing or heavy lifting? No  7. Have you been fighting overweight problems? No  8. Do you have restless legs? No  9. Do you have any night time cramps? No  10. Do you have any of the following in your legs:        NONE      When did you have your last labs drawn   Where did you have them done   What doctor ordered     If we do not have these labs you are retrieve these labs for these providers!     Do you have a surgery or procedure scheduled in the near future - No

## 2021-07-27 NOTE — PROGRESS NOTES
[facility] Electrophysiology Note      Reason for consultation:  Atrial fibrillation and orthostatic hypotension    Chief complaint:  follow up s/p ablation of atrial fibrillation and atrial flutter    Referring physician: Avis Mendoza      Primary care physician: Donato Perez MD      History of Present Illness: This visit 7/27/2021  Patient is here today for 6-month follow-up status post ablation of atrial fibrillation atrial flutter. He reports he has had no further episodes of atrial fibrillation or atrial flutter since the ablation. At last office visit his amiodarone was discontinued. He reports that he continues to feel well. He does complain of some bruising from taking Eliquis. He additionally reports that his blood pressures will have a 20 point increase from sitting to standing. He denies chest pain, palpitations, shortness of breath, lightheadedness, dizziness,  or syncope. He additionally complains of bilateral lower leg swelling and redness. He states that he wears compression stockings and this helps with the swelling. He tells me he has an appointment with his family doctor tomorrow to discuss his legs. Previous visit 4/28/2021  Patient is here today for 3-month follow-up status post ablation of atrial fibrillation and atrial flutter. He denies further episodes of atrial fibrillation atrial flutter. He denies tachycardia or palpitations since the ablation. He reports overall his he is feeling well. He is concerned about his fluctuating blood pressures. States that he is talked to Dr. Marvel Dancer about this and Dr. Marvel Dancer has done nothing about his blood pressures. Patient denies symptoms of high blood pressure. He continues to drink caffeine.   He denies chest pain, shortness of breath, lightheadedness, dizziness, edema or syncope    Previous visit 2/25/2021  Patient is here today for 1 month follow-up status post ablation of atrial fibrillation and atrial flutter. He denies palpitations or tachycardia since the ablation. He states that he has been feeling well. He states that his blood pressure is still fluctuating daily but he is following with Dr. Bhavani Sotelo his cardiologist.  He is taking his medications as prescribed. He is avoiding alcohol and caffeine. He is compliant with his CPAP    Previous visit 1/28/2021  Patient is here today for 1 week follow-up status post ablation of atrial fibrillation and typical atrial flutter. He denies palpitations or tachycardia since the procedure. He states that he does have bruising in his bilateral groin sites. He states that they are not tender. He also reports that he has had some fluctuating blood pressures. He is on midodrine and is following with Dr. Bhavani Sotelo. Previous visit    Patient here for atrial fib / flutter ablation. No change in H&P noted from previous clinic visit. Previous visit:   Patient is a 74yr old male with hx of PAF, LUCILLE on CPAP, HTN, HLD, CAD referred by  for atrial fibrillation management and syncope management. Patient report shortness of breath with exertion and this has been ongoing for several months now. Patient reported that he also has dizziness when going from sitting to standing position. This has been going on for some time too. He is on medication but still has. Recently he was stopped of norvasc. Patient denies chest pain. Patient does have palpitations. He was started on amiodarone. Patient also reports balance issues at times. Patient is taking antocoagulation. Patient denies fever or chills. Patient feels tired and weakness.      Chief Complaint   Patient presents with    3 Month Follow-Up    Edema         Pastmedical history:   Past Medical History:   Diagnosis Date    Arthritis     CAD (coronary artery disease)     Hyperlipidemia     Hypertension     S/P ablation of atrial fibrillation 01/21/2021    Atrial fibrillation and atrial flutter ablation by  Maite Vargas.       Surgical history :   Past Surgical History:   Procedure Laterality Date    COLONOSCOPY  2/4/13    Diverticulosis    CYST REMOVAL      baker cyst on right knee    HERNIA REPAIR      PTCA         Family history: History reviewed. No pertinent family history. Social history :  reports that he has never smoked. He has never used smokeless tobacco. He reports current alcohol use. He reports that he does not use drugs. No Known Allergies    Current Outpatient Medications on File Prior to Visit   Medication Sig Dispense Refill    ELIQUIS 5 MG TABS tablet TAKE 1 TABLET BY MOUTH TWICE A DAY 60 tablet 5    benzocaine-menthol (CEPACOL SORE THROAT) 15-3.6 MG lozenge Take 1 lozenge by mouth every 2 hours as needed for Sore Throat 50 lozenge 0    furosemide (LASIX) 20 MG tablet Take 20 mg by mouth daily      midodrine (PROAMATINE) 5 MG tablet Take 5 mg by mouth 3 times daily       tamsulosin (FLOMAX) 0.4 MG capsule Take 0.8 mg by mouth daily      atorvastatin (LIPITOR) 40 MG tablet Take 40 mg by mouth daily      albuterol sulfate  (90 Base) MCG/ACT inhaler Inhale 2 puffs into the lungs every 6 hours as needed for Wheezing 1 Inhaler 5    Multiple Vitamins-Minerals (THERAPEUTIC MULTIVITAMIN-MINERALS) tablet Take 1 tablet by mouth daily       No current facility-administered medications on file prior to visit. Review of Systems:   Review of Systems   Constitutional: Negative for activity change, chills, fatigue and fever. HENT: Negative for congestion, sinus pressure and sinus pain. Eyes: Negative for redness, itching and visual disturbance. Respiratory: Negative for cough, chest tightness, shortness of breath and wheezing. Cardiovascular: Negative for chest pain, palpitations and leg swelling. Gastrointestinal: Negative for abdominal distention, abdominal pain, blood in stool, constipation, diarrhea, nausea and vomiting.    Endocrine: Negative for cold intolerance and heat intolerance. Genitourinary: Negative for difficulty urinating, dysuria and hematuria. Musculoskeletal: Positive for arthralgias. Negative for back pain. Skin: Negative for color change, pallor, rash and wound. bruising   Allergic/Immunologic: Negative for food allergies. Neurological: Negative for dizziness, syncope, light-headedness, numbness and headaches. Hematological: Does not bruise/bleed easily. Psychiatric/Behavioral: Negative for agitation, behavioral problems and confusion. The patient is not nervous/anxious. Examination:      Vitals:    07/27/21 1147   BP: 118/78   Pulse: 72   Weight: 173 lb 9.6 oz (78.7 kg)   Height: 5' 10\" (1.778 m)        Body mass index is 24.91 kg/m². Physical Exam  Constitutional:       General: He is not in acute distress. Appearance: Normal appearance. He is well-developed. He is not ill-appearing or diaphoretic. HENT:      Head: Normocephalic and atraumatic. Right Ear: External ear normal.      Left Ear: External ear normal.      Nose: No congestion or rhinorrhea. Mouth/Throat:      Mouth: Mucous membranes are moist.      Pharynx: Oropharynx is clear. No oropharyngeal exudate or posterior oropharyngeal erythema. Eyes:      General:         Right eye: No discharge. Left eye: No discharge. Pupils: Pupils are equal, round, and reactive to light. Neck:      Vascular: No carotid bruit or JVD. Cardiovascular:      Rate and Rhythm: Normal rate and regular rhythm. Heart sounds: Murmur (grade 2/6 systolic murmur) heard. No friction rub. Pulmonary:      Effort: Pulmonary effort is normal. No respiratory distress. Breath sounds: Normal breath sounds. No wheezing, rhonchi or rales. Abdominal:      General: Bowel sounds are normal. There is no distension. Palpations: Abdomen is soft. Tenderness: There is no abdominal tenderness.    Musculoskeletal:      Cervical back: Normal range of motion and neck supple. No muscular tenderness. Right lower leg: No edema. Left lower leg: No edema. Skin:     General: Skin is warm and dry. Capillary Refill: Capillary refill takes less than 2 seconds. Findings: Erythema (Bilateral lower legs) present. Neurological:      Mental Status: He is alert and oriented to person, place, and time. Cranial Nerves: No cranial nerve deficit. Psychiatric:         Mood and Affect: Mood normal.         Behavior: Behavior normal.         Thought Content: Thought content normal.         Judgment: Judgment normal.         CBC:   Lab Results   Component Value Date    WBC 8.9 01/22/2021    HGB 12.4 01/22/2021    HCT 39.1 01/22/2021     01/22/2021     Lipids:  Lab Results   Component Value Date    CHOL 151 06/26/2013    TRIG 143 06/26/2013    HDL 56 (L) 06/26/2013    LDLCALC 94 01/11/2011    LDLDIRECT 65 06/26/2013     PT/INR:   Lab Results   Component Value Date    INR 1.19 01/14/2021        BMP:    Lab Results   Component Value Date     01/22/2021    K 3.7 01/22/2021     01/22/2021    CO2 23 01/22/2021    BUN 18 01/22/2021     CMP:   Lab Results   Component Value Date    AST 17 12/17/2020    PROT 5.7 (L) 12/17/2020    BILITOT 0.4 12/17/2020    ALKPHOS 80 12/17/2020     TSH:       EKG Interpretation: Sinus rhythm      IMPRESSION / RECOMMENDATIONS:     Status post ablation of atrial fibrillation  Status post ablation of typical atrial flutter  Hypotension  LUCILLE on CPAP  Bilateral lower leg erythema    Patient here for follow-up on atrial fibrillation and atrial flutter ablation  Reports overall he is doing well  EKG obtained shows sinus rhythm heart rate 72    Patient has had no further episodes of atrial fibrillation or atrial flutter since the ablation.     CHADS2 score 4- 1 point hypertension, 2 points for age, 1 point for CAD  Continue anticoagulation-Eliquis 5 mg twice daily    Vitals:    07/27/21 1147   BP: 118/78   Pulse: 72     Blood pressure is stable. Continue midodrine 5 mg 3 times daily. He is compliant with his CPAP  Discussed with him importance of wearing CPAP   he voiced understanding    I assessed patient's bilateral lower legs. Erythema noted. No swelling noted no protruding lower leg vessels noted. Patient to follow-up with family doctor to assess for possible cellulitis. This was discussed with patient who voiced understanding    Patient to follow-up with cardiology within the next 3 months  Patient to follow-up with EP as needed    Thanks again for allowing me to participate in care of this patient. Please call me if you have any questions. With best regards. TYRA Green - CNP, 7/27/2021 12:10 PM     Please note this report has been partially produced using speech recognition software and may contain errors related to that system including errors in grammar, punctuation, and spelling, as well as words and phrases that may be inappropriate. If there are any questions or concerns please feel free to contact the dictating provider for clarification.

## 2021-07-29 ENCOUNTER — TELEPHONE (OUTPATIENT)
Dept: CARDIOLOGY CLINIC | Age: 81
End: 2021-07-29

## 2021-07-29 ENCOUNTER — HOSPITAL ENCOUNTER (OUTPATIENT)
Age: 81
Discharge: HOME | End: 2021-07-29
Payer: MEDICARE

## 2021-07-29 LAB
ANION GAP SERPL CALCULATED.3IONS-SCNC: 13 MMOL/L (ref 4–16)
APTT: 30.7 SECONDS (ref 25.1–37.1)
BUN BLDV-MCNC: 27 MG/DL (ref 6–23)
CALCIUM SERPL-MCNC: 9 MG/DL (ref 8.3–10.6)
CHLORIDE BLD-SCNC: 104 MMOL/L (ref 99–110)
CO2: 24 MMOL/L (ref 21–32)
CREAT SERPL-MCNC: 1.4 MG/DL (ref 0.9–1.3)
GFR AFRICAN AMERICAN: 59 ML/MIN/1.73M2
GFR NON-AFRICAN AMERICAN: 49 ML/MIN/1.73M2
GLUCOSE BLD-MCNC: 106 MG/DL (ref 70–99)
HCT VFR BLD CALC: 44.6 % (ref 42–52)
HEMOGLOBIN: 14.3 GM/DL (ref 13.5–18)
INR BLD: 1.1 INDEX
MCH RBC QN AUTO: 29.2 PG (ref 27–31)
MCHC RBC AUTO-ENTMCNC: 32.1 % (ref 32–36)
MCV RBC AUTO: 91.2 FL (ref 78–100)
PDW BLD-RTO: 12.9 % (ref 11.7–14.9)
PLATELET # BLD: 170 K/CU MM (ref 140–440)
PMV BLD AUTO: 10.9 FL (ref 7.5–11.1)
POTASSIUM SERPL-SCNC: 4.4 MMOL/L (ref 3.5–5.1)
PROTHROMBIN TIME: 14.2 SECONDS (ref 11.7–14.5)
RBC # BLD: 4.89 M/CU MM (ref 4.6–6.2)
SODIUM BLD-SCNC: 141 MMOL/L (ref 135–145)
WBC # BLD: 4.9 K/CU MM (ref 4–10.5)

## 2021-07-29 PROCEDURE — 80048 BASIC METABOLIC PNL TOTAL CA: CPT

## 2021-07-29 PROCEDURE — 85730 THROMBOPLASTIN TIME PARTIAL: CPT

## 2021-07-29 PROCEDURE — 36415 COLL VENOUS BLD VENIPUNCTURE: CPT

## 2021-07-29 PROCEDURE — 85610 PROTHROMBIN TIME: CPT

## 2021-07-29 PROCEDURE — 85027 COMPLETE CBC AUTOMATED: CPT

## 2021-07-29 NOTE — TELEPHONE ENCOUNTER
ekg faxed.  Left message for patient that primary cardiologist - Dr Tolu Honeycutt will need to do clearance

## 2021-07-29 NOTE — TELEPHONE ENCOUNTER
Pt states needs EKG faxed to Dr Johnny Treadwell [phone] for clearance.  Pt will have them send request

## 2021-09-09 ENCOUNTER — APPOINTMENT (RX ONLY)
Dept: URBAN - METROPOLITAN AREA CLINIC 377 | Facility: CLINIC | Age: 81
Setting detail: DERMATOLOGY
End: 2021-09-09

## 2021-09-09 DIAGNOSIS — D69.2 OTHER NONTHROMBOCYTOPENIC PURPURA: ICD-10-CM

## 2021-09-09 DIAGNOSIS — L81.7 PIGMENTED PURPURIC DERMATOSIS: ICD-10-CM | Status: INADEQUATELY CONTROLLED

## 2021-09-09 DIAGNOSIS — L30.9 DERMATITIS, UNSPECIFIED: ICD-10-CM | Status: INADEQUATELY CONTROLLED

## 2021-09-09 PROBLEM — D23.5 OTHER BENIGN NEOPLASM OF SKIN OF TRUNK: Status: ACTIVE | Noted: 2021-09-09

## 2021-09-09 PROCEDURE — ? PRESCRIPTION MEDICATION MANAGEMENT

## 2021-09-09 PROCEDURE — ? ADDITIONAL NOTES

## 2021-09-09 PROCEDURE — ? COUNSELING

## 2021-09-09 PROCEDURE — ? PRESCRIPTION

## 2021-09-09 PROCEDURE — ? MEDICATION COUNSELING

## 2021-09-09 PROCEDURE — 99204 OFFICE O/P NEW MOD 45 MIN: CPT

## 2021-09-09 RX ORDER — TRIAMCINOLONE ACETONIDE 1 MG/G
CREAM TOPICAL BID
Qty: 80 | Refills: 3 | Status: ERX | COMMUNITY
Start: 2021-09-09

## 2021-09-09 RX ADMIN — TRIAMCINOLONE ACETONIDE: 1 CREAM TOPICAL at 00:00

## 2021-09-09 ASSESSMENT — LOCATION DETAILED DESCRIPTION DERM
LOCATION DETAILED: LEFT DISTAL DORSAL FOREARM
LOCATION DETAILED: RIGHT DISTAL DORSAL FOREARM
LOCATION DETAILED: STERNUM
LOCATION DETAILED: LEFT DISTAL PRETIBIAL REGION
LOCATION DETAILED: RIGHT DISTAL PRETIBIAL REGION

## 2021-09-09 ASSESSMENT — LOCATION ZONE DERM
LOCATION ZONE: ARM
LOCATION ZONE: TRUNK
LOCATION ZONE: LEG

## 2021-09-09 ASSESSMENT — LOCATION SIMPLE DESCRIPTION DERM
LOCATION SIMPLE: LEFT FOREARM
LOCATION SIMPLE: LEFT PRETIBIAL REGION
LOCATION SIMPLE: RIGHT PRETIBIAL REGION
LOCATION SIMPLE: RIGHT FOREARM
LOCATION SIMPLE: CHEST

## 2021-09-09 NOTE — PROCEDURE: MEDICATION COUNSELING
Patient not called per MD protocol to be discussed at next appointment.   Carac Counseling:  I discussed with the patient the risks of Carac including but not limited to erythema, scaling, itching, weeping, crusting, and pain.

## 2021-09-09 NOTE — PROCEDURE: PRESCRIPTION MEDICATION MANAGEMENT
Detail Level: Zone
Initiate Treatment: trial TAC 1:1 CeraVe to see if darkest areas will fade, although tis doesn't always do well\\n-no underlying olyfl-[id number]-fhfr can just occur\\n-w/u bleeding disorder by PCP only if further concerns
Render In Strict Bullet Format?: No

## 2021-09-09 NOTE — PROCEDURE: MEDICATION COUNSELING
Xeldariaz Pregnancy And Lactation Text: This medication is Pregnancy Category D and is not considered safe during pregnancy.  The risk during breast feeding is also uncertain.

## 2021-09-09 NOTE — PROCEDURE: ADDITIONAL NOTES
Render Risk Assessment In Note?: no
Additional Notes: pt told no work-up for bleeding disorder unless PCP concerned with any other bleeding
Detail Level: Detailed

## 2021-09-09 NOTE — PROCEDURE: MIPS QUALITY
Quality 226: Preventive Care And Screening: Tobacco Use: Screening And Cessation Intervention: Patient screened for tobacco use and is an ex/non-smoker
Detail Level: Detailed
Quality 431: Preventive Care And Screening: Unhealthy Alcohol Use - Screening: Patient identified as an unhealthy alcohol user when screened for unhealthy alcohol use using a systematic screening method and received brief counseling
Quality 130: Documentation Of Current Medications In The Medical Record: Current Medications Documented

## 2021-10-16 ENCOUNTER — APPOINTMENT (OUTPATIENT)
Dept: GENERAL RADIOLOGY | Age: 81
DRG: 310 | End: 2021-10-16
Payer: MEDICARE

## 2021-10-16 ENCOUNTER — HOSPITAL ENCOUNTER (INPATIENT)
Age: 81
LOS: 2 days | Discharge: HOME | DRG: 310 | End: 2021-10-19
Attending: EMERGENCY MEDICINE | Admitting: HOSPITALIST
Payer: MEDICARE

## 2021-10-16 DIAGNOSIS — I48.91 ATRIAL FIBRILLATION WITH RAPID VENTRICULAR RESPONSE (HCC): Primary | ICD-10-CM

## 2021-10-16 PROCEDURE — 71045 X-RAY EXAM CHEST 1 VIEW: CPT

## 2021-10-16 PROCEDURE — 84484 ASSAY OF TROPONIN QUANT: CPT

## 2021-10-16 PROCEDURE — 83605 ASSAY OF LACTIC ACID: CPT

## 2021-10-16 PROCEDURE — 80053 COMPREHEN METABOLIC PANEL: CPT

## 2021-10-16 PROCEDURE — 96365 THER/PROPH/DIAG IV INF INIT: CPT

## 2021-10-16 PROCEDURE — 83690 ASSAY OF LIPASE: CPT

## 2021-10-16 PROCEDURE — 2580000003 HC RX 258: Performed by: EMERGENCY MEDICINE

## 2021-10-16 PROCEDURE — 83735 ASSAY OF MAGNESIUM: CPT

## 2021-10-16 PROCEDURE — 93005 ELECTROCARDIOGRAM TRACING: CPT | Performed by: EMERGENCY MEDICINE

## 2021-10-16 PROCEDURE — 82248 BILIRUBIN DIRECT: CPT

## 2021-10-16 PROCEDURE — 85025 COMPLETE CBC W/AUTO DIFF WBC: CPT

## 2021-10-16 PROCEDURE — 99284 EMERGENCY DEPT VISIT MOD MDM: CPT

## 2021-10-16 PROCEDURE — 81001 URINALYSIS AUTO W/SCOPE: CPT

## 2021-10-16 RX ORDER — DILTIAZEM HYDROCHLORIDE 5 MG/ML
15 INJECTION INTRAVENOUS ONCE
Status: COMPLETED | OUTPATIENT
Start: 2021-10-16 | End: 2021-10-17

## 2021-10-16 RX ORDER — 0.9 % SODIUM CHLORIDE 0.9 %
500 INTRAVENOUS SOLUTION INTRAVENOUS ONCE
Status: COMPLETED | OUTPATIENT
Start: 2021-10-16 | End: 2021-10-17

## 2021-10-16 RX ADMIN — SODIUM CHLORIDE 500 ML: 9 INJECTION, SOLUTION INTRAVENOUS at 23:12

## 2021-10-17 PROBLEM — I48.91 ATRIAL FIBRILLATION (HCC): Status: ACTIVE | Noted: 2021-10-17

## 2021-10-17 LAB
ALBUMIN SERPL-MCNC: 3.9 GM/DL (ref 3.4–5)
ALP BLD-CCNC: 75 IU/L (ref 40–129)
ALT SERPL-CCNC: 22 U/L (ref 10–40)
ANION GAP SERPL CALCULATED.3IONS-SCNC: 9 MMOL/L (ref 4–16)
APTT: 30.5 SECONDS (ref 25.1–37.1)
AST SERPL-CCNC: 24 IU/L (ref 15–37)
BACTERIA: NEGATIVE /HPF
BASOPHILS ABSOLUTE: 0 K/CU MM
BASOPHILS RELATIVE PERCENT: 0.2 % (ref 0–1)
BILIRUB SERPL-MCNC: 0.2 MG/DL (ref 0–1)
BILIRUBIN DIRECT: 0.2 MG/DL (ref 0–0.3)
BILIRUBIN URINE: NEGATIVE MG/DL
BILIRUBIN, INDIRECT: 0 MG/DL (ref 0–0.7)
BLOOD, URINE: ABNORMAL
BUN BLDV-MCNC: 30 MG/DL (ref 6–23)
CALCIUM SERPL-MCNC: 8.3 MG/DL (ref 8.3–10.6)
CHLORIDE BLD-SCNC: 110 MMOL/L (ref 99–110)
CLARITY: CLEAR
CO2: 22 MMOL/L (ref 21–32)
COLOR: YELLOW
CREAT SERPL-MCNC: 1 MG/DL (ref 0.9–1.3)
DIFFERENTIAL TYPE: ABNORMAL
EKG ATRIAL RATE: 156 BPM
EKG DIAGNOSIS: NORMAL
EKG Q-T INTERVAL: 308 MS
EKG QRS DURATION: 92 MS
EKG QTC CALCULATION (BAZETT): 485 MS
EKG R AXIS: -29 DEGREES
EKG T AXIS: 151 DEGREES
EKG VENTRICULAR RATE: 149 BPM
EOSINOPHILS ABSOLUTE: 0.1 K/CU MM
EOSINOPHILS RELATIVE PERCENT: 2.4 % (ref 0–3)
GFR AFRICAN AMERICAN: >60 ML/MIN/1.73M2
GFR NON-AFRICAN AMERICAN: >60 ML/MIN/1.73M2
GLUCOSE BLD-MCNC: 170 MG/DL (ref 70–99)
GLUCOSE, URINE: NEGATIVE MG/DL
HCT VFR BLD CALC: 40.8 % (ref 42–52)
HEMOGLOBIN: 13 GM/DL (ref 13.5–18)
IMMATURE NEUTROPHIL %: 0.3 % (ref 0–0.43)
INR BLD: 1.18 INDEX
KETONES, URINE: NEGATIVE MG/DL
LACTATE: 0.9 MMOL/L (ref 0.4–2)
LEUKOCYTE ESTERASE, URINE: NEGATIVE
LIPASE: 31 IU/L (ref 13–60)
LYMPHOCYTES ABSOLUTE: 0.9 K/CU MM
LYMPHOCYTES RELATIVE PERCENT: 15.1 % (ref 24–44)
MAGNESIUM: 2 MG/DL (ref 1.8–2.4)
MCH RBC QN AUTO: 29.6 PG (ref 27–31)
MCHC RBC AUTO-ENTMCNC: 31.9 % (ref 32–36)
MCV RBC AUTO: 92.9 FL (ref 78–100)
MONOCYTES ABSOLUTE: 0.6 K/CU MM
MONOCYTES RELATIVE PERCENT: 10.9 % (ref 0–4)
MUCUS: ABNORMAL HPF
NITRITE URINE, QUANTITATIVE: NEGATIVE
NUCLEATED RBC %: 0 %
PDW BLD-RTO: 12.8 % (ref 11.7–14.9)
PH, URINE: 6 (ref 5–8)
PLATELET # BLD: 124 K/CU MM (ref 140–440)
PMV BLD AUTO: 9.9 FL (ref 7.5–11.1)
POTASSIUM SERPL-SCNC: 3.5 MMOL/L (ref 3.5–5.1)
PROTEIN UA: 30 MG/DL
PROTHROMBIN TIME: 15.2 SECONDS (ref 11.7–14.5)
RBC # BLD: 4.39 M/CU MM (ref 4.6–6.2)
RBC URINE: 38 /HPF (ref 0–3)
SARS-COV-2, NAAT: NOT DETECTED
SEGMENTED NEUTROPHILS ABSOLUTE COUNT: 4.2 K/CU MM
SEGMENTED NEUTROPHILS RELATIVE PERCENT: 71.1 % (ref 36–66)
SODIUM BLD-SCNC: 141 MMOL/L (ref 135–145)
SOURCE: NORMAL
SPECIFIC GRAVITY UA: 1.02 (ref 1–1.03)
SQUAMOUS EPITHELIAL: <1 /HPF
T4 FREE: 1.36 NG/DL (ref 0.9–1.8)
TOTAL IMMATURE NEUTOROPHIL: 0.02 K/CU MM
TOTAL NUCLEATED RBC: 0 K/CU MM
TOTAL PROTEIN: 6.4 GM/DL (ref 6.4–8.2)
TRICHOMONAS: ABNORMAL /HPF
TROPONIN T: <0.01 NG/ML
TSH HIGH SENSITIVITY: 1.85 UIU/ML (ref 0.27–4.2)
UROBILINOGEN, URINE: NEGATIVE MG/DL (ref 0.2–1)
WBC # BLD: 5.9 K/CU MM (ref 4–10.5)
WBC UA: 2 /HPF (ref 0–2)

## 2021-10-17 PROCEDURE — 93005 ELECTROCARDIOGRAM TRACING: CPT | Performed by: INTERNAL MEDICINE

## 2021-10-17 PROCEDURE — 6370000000 HC RX 637 (ALT 250 FOR IP): Performed by: INTERNAL MEDICINE

## 2021-10-17 PROCEDURE — 84439 ASSAY OF FREE THYROXINE: CPT

## 2021-10-17 PROCEDURE — 84443 ASSAY THYROID STIM HORMONE: CPT

## 2021-10-17 PROCEDURE — 2500000003 HC RX 250 WO HCPCS: Performed by: HOSPITALIST

## 2021-10-17 PROCEDURE — 2500000003 HC RX 250 WO HCPCS: Performed by: EMERGENCY MEDICINE

## 2021-10-17 PROCEDURE — 1200000000 HC SEMI PRIVATE

## 2021-10-17 PROCEDURE — 6370000000 HC RX 637 (ALT 250 FOR IP): Performed by: HOSPITALIST

## 2021-10-17 PROCEDURE — 85610 PROTHROMBIN TIME: CPT

## 2021-10-17 PROCEDURE — 85730 THROMBOPLASTIN TIME PARTIAL: CPT

## 2021-10-17 PROCEDURE — 93010 ELECTROCARDIOGRAM REPORT: CPT | Performed by: INTERNAL MEDICINE

## 2021-10-17 PROCEDURE — 36415 COLL VENOUS BLD VENIPUNCTURE: CPT

## 2021-10-17 PROCEDURE — 96365 THER/PROPH/DIAG IV INF INIT: CPT

## 2021-10-17 PROCEDURE — 87635 SARS-COV-2 COVID-19 AMP PRB: CPT

## 2021-10-17 RX ORDER — ROSUVASTATIN CALCIUM 20 MG/1
20 TABLET, COATED ORAL NIGHTLY
COMMUNITY

## 2021-10-17 RX ORDER — ONDANSETRON 2 MG/ML
4 INJECTION INTRAMUSCULAR; INTRAVENOUS EVERY 6 HOURS PRN
Status: DISCONTINUED | OUTPATIENT
Start: 2021-10-17 | End: 2021-10-19 | Stop reason: HOSPADM

## 2021-10-17 RX ORDER — SODIUM CHLORIDE 0.9 % (FLUSH) 0.9 %
5-40 SYRINGE (ML) INJECTION EVERY 12 HOURS SCHEDULED
Status: DISCONTINUED | OUTPATIENT
Start: 2021-10-17 | End: 2021-10-19 | Stop reason: HOSPADM

## 2021-10-17 RX ORDER — POTASSIUM CHLORIDE 20 MEQ/1
40 TABLET, EXTENDED RELEASE ORAL ONCE
Status: COMPLETED | OUTPATIENT
Start: 2021-10-17 | End: 2021-10-17

## 2021-10-17 RX ORDER — POTASSIUM CHLORIDE 20 MEQ/1
20 TABLET, EXTENDED RELEASE ORAL DAILY
Status: DISCONTINUED | OUTPATIENT
Start: 2021-10-17 | End: 2021-10-19

## 2021-10-17 RX ORDER — TAMSULOSIN HYDROCHLORIDE 0.4 MG/1
0.8 CAPSULE ORAL DAILY
Status: DISCONTINUED | OUTPATIENT
Start: 2021-10-17 | End: 2021-10-19 | Stop reason: HOSPADM

## 2021-10-17 RX ORDER — ATORVASTATIN CALCIUM 40 MG/1
40 TABLET, FILM COATED ORAL DAILY
Status: DISCONTINUED | OUTPATIENT
Start: 2021-10-17 | End: 2021-10-17

## 2021-10-17 RX ORDER — MIDODRINE HYDROCHLORIDE 5 MG/1
5 TABLET ORAL
Status: DISCONTINUED | OUTPATIENT
Start: 2021-10-17 | End: 2021-10-18

## 2021-10-17 RX ORDER — FUROSEMIDE 20 MG/1
20 TABLET ORAL DAILY
Status: DISCONTINUED | OUTPATIENT
Start: 2021-10-17 | End: 2021-10-19

## 2021-10-17 RX ORDER — SODIUM CHLORIDE 0.9 % (FLUSH) 0.9 %
5-40 SYRINGE (ML) INJECTION PRN
Status: DISCONTINUED | OUTPATIENT
Start: 2021-10-17 | End: 2021-10-19 | Stop reason: HOSPADM

## 2021-10-17 RX ORDER — SODIUM CHLORIDE 9 MG/ML
25 INJECTION, SOLUTION INTRAVENOUS PRN
Status: DISCONTINUED | OUTPATIENT
Start: 2021-10-17 | End: 2021-10-19 | Stop reason: HOSPADM

## 2021-10-17 RX ORDER — ALBUTEROL SULFATE 90 UG/1
2 AEROSOL, METERED RESPIRATORY (INHALATION) EVERY 6 HOURS PRN
Status: DISCONTINUED | OUTPATIENT
Start: 2021-10-17 | End: 2021-10-19 | Stop reason: HOSPADM

## 2021-10-17 RX ORDER — ROSUVASTATIN CALCIUM 5 MG/1
20 TABLET, COATED ORAL NIGHTLY
Status: DISCONTINUED | OUTPATIENT
Start: 2021-10-17 | End: 2021-10-19 | Stop reason: HOSPADM

## 2021-10-17 RX ORDER — POTASSIUM CHLORIDE 20 MEQ/1
40 TABLET, EXTENDED RELEASE ORAL PRN
Status: DISCONTINUED | OUTPATIENT
Start: 2021-10-17 | End: 2021-10-19 | Stop reason: HOSPADM

## 2021-10-17 RX ORDER — MAGNESIUM SULFATE IN WATER 40 MG/ML
2000 INJECTION, SOLUTION INTRAVENOUS PRN
Status: DISCONTINUED | OUTPATIENT
Start: 2021-10-17 | End: 2021-10-19 | Stop reason: HOSPADM

## 2021-10-17 RX ORDER — POLYETHYLENE GLYCOL 3350 17 G/17G
17 POWDER, FOR SOLUTION ORAL DAILY PRN
Status: DISCONTINUED | OUTPATIENT
Start: 2021-10-17 | End: 2021-10-19 | Stop reason: HOSPADM

## 2021-10-17 RX ORDER — ACETAMINOPHEN 325 MG/1
650 TABLET ORAL EVERY 6 HOURS PRN
Status: DISCONTINUED | OUTPATIENT
Start: 2021-10-17 | End: 2021-10-19 | Stop reason: HOSPADM

## 2021-10-17 RX ORDER — SOTALOL HYDROCHLORIDE 80 MG/1
80 TABLET ORAL 2 TIMES DAILY
Status: DISCONTINUED | OUTPATIENT
Start: 2021-10-17 | End: 2021-10-18

## 2021-10-17 RX ORDER — ACETAMINOPHEN 650 MG/1
650 SUPPOSITORY RECTAL EVERY 6 HOURS PRN
Status: DISCONTINUED | OUTPATIENT
Start: 2021-10-17 | End: 2021-10-19 | Stop reason: HOSPADM

## 2021-10-17 RX ORDER — ONDANSETRON 4 MG/1
4 TABLET, ORALLY DISINTEGRATING ORAL EVERY 8 HOURS PRN
Status: DISCONTINUED | OUTPATIENT
Start: 2021-10-17 | End: 2021-10-19 | Stop reason: HOSPADM

## 2021-10-17 RX ORDER — AMLODIPINE BESYLATE 5 MG/1
5 TABLET ORAL NIGHTLY
Status: ON HOLD | COMMUNITY
End: 2021-10-19 | Stop reason: HOSPADM

## 2021-10-17 RX ORDER — DILTIAZEM HYDROCHLORIDE 180 MG/1
180 CAPSULE, COATED, EXTENDED RELEASE ORAL DAILY
Status: DISCONTINUED | OUTPATIENT
Start: 2021-10-17 | End: 2021-10-17

## 2021-10-17 RX ORDER — M-VIT,TX,IRON,MINS/CALC/FOLIC 27MG-0.4MG
1 TABLET ORAL DAILY
Status: DISCONTINUED | OUTPATIENT
Start: 2021-10-17 | End: 2021-10-19 | Stop reason: HOSPADM

## 2021-10-17 RX ORDER — POTASSIUM CHLORIDE 7.45 MG/ML
10 INJECTION INTRAVENOUS PRN
Status: DISCONTINUED | OUTPATIENT
Start: 2021-10-17 | End: 2021-10-19 | Stop reason: HOSPADM

## 2021-10-17 RX ORDER — METOPROLOL SUCCINATE 25 MG/1
25 TABLET, EXTENDED RELEASE ORAL EVERY EVENING
Status: DISCONTINUED | OUTPATIENT
Start: 2021-10-17 | End: 2021-10-18

## 2021-10-17 RX ADMIN — METOPROLOL SUCCINATE 25 MG: 25 TABLET, EXTENDED RELEASE ORAL at 17:28

## 2021-10-17 RX ADMIN — Medication 1 TABLET: at 09:36

## 2021-10-17 RX ADMIN — FUROSEMIDE 20 MG: 20 TABLET ORAL at 09:37

## 2021-10-17 RX ADMIN — APIXABAN 5 MG: 5 TABLET, FILM COATED ORAL at 20:22

## 2021-10-17 RX ADMIN — APIXABAN 5 MG: 5 TABLET, FILM COATED ORAL at 03:09

## 2021-10-17 RX ADMIN — DILTIAZEM HYDROCHLORIDE 15 MG: 5 INJECTION INTRAVENOUS at 00:09

## 2021-10-17 RX ADMIN — DEXTROSE MONOHYDRATE 10 MG/HR: 50 INJECTION, SOLUTION INTRAVENOUS at 00:15

## 2021-10-17 RX ADMIN — APIXABAN 5 MG: 5 TABLET, FILM COATED ORAL at 09:36

## 2021-10-17 RX ADMIN — POTASSIUM CHLORIDE 40 MEQ: 20 TABLET, EXTENDED RELEASE ORAL at 17:28

## 2021-10-17 RX ADMIN — SOTALOL HYDROCHLORIDE 80 MG: 80 TABLET ORAL at 20:22

## 2021-10-17 RX ADMIN — DEXTROSE MONOHYDRATE 10 MG/HR: 50 INJECTION, SOLUTION INTRAVENOUS at 07:17

## 2021-10-17 RX ADMIN — DILTIAZEM HYDROCHLORIDE 180 MG: 180 CAPSULE, COATED, EXTENDED RELEASE ORAL at 09:36

## 2021-10-17 RX ADMIN — ROSUVASTATIN CALCIUM 20 MG: 5 TABLET, COATED ORAL at 20:22

## 2021-10-17 NOTE — CARE COORDINATION
CM - room # 25--CM Looking for pt that may be assisted without admission ---this pt id being admitted for  her A- Fib with RVR=heart rate peaking at 156. Pt admitted . CM will continue to monitor .

## 2021-10-17 NOTE — PROGRESS NOTES
Patient seen and examined, admitted by my partner earlier today. He has a history of paroxysmal A. fib s/p ablation and currently on anticoagulation with Eliquis. Patient came in with symptomatic A. fib, was in A. fib RVR on arrival.  Did require Cardizem gtt. but has slowed down considerably. I have transitioned him to oral Cardizem. Patient is not on any rate controlling agents PTA. It appears that he was taken off metoprolol for possible orthostatic hypotension (for which he has been placed on midodrine). Details not very clear at this time. He requested to call his electrophysiologist.    I tried without success as he was not available at this time. I therefore discussed with cardiology who recommends the patient showed remain in-house for possible cardioversion in a.m. I discussed this with patient  Vitals:    10/17/21 1222 10/17/21 1248 10/17/21 1254 10/17/21 1352   BP:   116/78    Pulse: 99 101 109 94   Resp:       Temp:       TempSrc:       SpO2:       Weight:       Height:         On exam  Alert and oriented x3  Chest is clear bilaterally  Irregularly irregular but rate controlled  No focal deficits. Normal gait. No calf tenderness. Paroxysmal A. fib with RVR: Now rate controlled on diltiazem. Transition GTT to p.o.   Continue anticoagulation with Eliquis  [facility] hospitalist will continue to follow

## 2021-10-17 NOTE — CONSULTS
Dictation # [id number]  Afib with RVR starting last night  Hx of afib ablation with Dr. Ayla Morfin  Currently on Eliquis for anticoagulation. Not on any antiarrhythmics. Rate controlled at this time with Oral diltiazem. Afib still HR   Will Plan on MICHAEL/DCCV tomorrow  NPO after midnight  Rapid Covid test    Echo 1/21/2021   Summary   This is a limited echocardiogram.   Left ventricular systolic function is normal.   Ejection fraction is visually estimated at 55%. Mild mitral regurgitation. No evidence of any pericardial effusion. Cath--2/2020--IMPRESSION:  1. Left main is patent. 2.  Circ has mild disease noted in OM1 and OM2 mild disease noted. 3.  LAD has a proximal stent and mid stent present, which is widely  patent. 4.  RCA has mild disease noted, is a dominant vessel. 5.  EDP was normal.  6.  Right heart pressure was normal.     The patient has patent stents in the LAD present; otherwise, mild  disease in the circ and RCA present. Normal right heart pressure  Present.     Electronically signed by Collette French MD on 10/17/21 at 4:56 PM EDT    Cath-2/2020  DICTATED -[id number]  NORMAL RIGHT HEART PRESSURE  LEFT MAIN   LAD STENT  LCX MILD DX  RCA MILD DX  LVEDP 10     He had afib ablation 1/2021  He is off amiodarone and rate controlling meds  He said he is on amlodipine for BP  He has issues with BP very labile  Will place on Sotalol for now and watch 48 hrs  Check ekg daily  Hx of CAD and PCI in past, last cath 2020 -patent stents  Plan for MICHAEL and CV in am    Electronically signed by Collette French MD on 10/17/21 at 5:03 PM EDT

## 2021-10-17 NOTE — ED NOTES
Assisted pt up to use the restroom, gave pt a urine specimen cup w/label.       Dana Hernandez RN  10/16/21 3475

## 2021-10-17 NOTE — H&P
He is status post ablation with Dr. Cesia Salmeron in June and has been doing well. He was not on any rate controlling medication. In the ER his chemistries revealed sodium 141, potassium 3.5, chloride 110, bicarb 22, platelet 30 creatinine 1, anion gap 9 magnesium 2, lactate 0.9 glucose 170 calcium 8.3 total protein 6.4. Troponin T less than 0.010. Liver function profile within normal limits. .  Glucose 170. He will\" revealed WBC 5.9 hemoglobin 13 hematocrit 40.8 and platelet count of 552. Chest reveals confluent opacities with enlarged cardiac silhouette. Patient would be admitted to the hospitalist service. Continue intravenous Cardizem. Consult Dr. Shiv Harris MICHAEL and cardioversion. Ten point ROS reviewed negative, unless as noted above    Objective:   No intake or output data in the 24 hours ending 10/17/21 0127   Vitals:   Vitals:    10/17/21 0056   BP: 106/72   Pulse: 93   Resp: 19   Temp:    SpO2: 95%     Physical Exam:   GEN Awake male, sitting upright in bed in no apparent distress. Appears given age. EYES Pupils are equally round. No scleral erythema, discharge, or conjunctivitis. HENT Mucous membranes are moist. Oral pharynx without exudates, no evidence of thrush. NECK Supple, no apparent thyromegaly or masses. RESP Clear to auscultation, no wheezes, rales or rhonchi. Symmetric chest movement while on room air. CARDIO/VASC S1/S2 auscultated. Irregular rate without appreciable murmurs, rubs, or gallops. No JVD or carotid bruits. Peripheral pulses equal bilaterally and palpable. No peripheral edema. GI Abdomen is soft without significant tenderness, masses, or guarding. Bowel sounds are normoactive. Rectal exam deferred.  No costovertebral angle tenderness. Normal appearing external genitalia. Dexter catheter is not present. HEME/LYMPH No palpable cervical lymphadenopathy and no hepatosplenomegaly. No petechiae or ecchymoses. MSK No gross joint deformities.   SKIN Normal coloration, warm, dry. NEURO Cranial nerves appear grossly intact, normal speech, no lateralizing weakness. PSYCH Awake, alert, oriented x 4. Affect appropriate. Past Medical History:      Past Medical History:   Diagnosis Date    Arthritis     CAD (coronary artery disease)     Hyperlipidemia     Hypertension     S/P ablation of atrial fibrillation 01/21/2021    Atrial fibrillation and atrial flutter ablation by Dr. Julianna Ruby. PSHX:  has a past surgical history that includes Percutaneous Transluminal Coronary Angio; hernia repair; cyst removal; and Colonoscopy (2/4/13). Allergies: No Known Allergies    FAM HX: family history is not on file. Soc HX:   Social History     Socioeconomic History    Marital status:      Spouse name: None    Number of children: None    Years of education: None    Highest education level: None   Occupational History    None   Tobacco Use    Smoking status: Never Smoker    Smokeless tobacco: Never Used   Vaping Use    Vaping Use: Never assessed   Substance and Sexual Activity    Alcohol use: Yes     Comment: occas    Drug use: No     Comment: 2 cups of coffee daily     Sexual activity: Not Currently     Partners: Female   Other Topics Concern    None   Social History Narrative    None     Social Determinants of Health     Financial Resource Strain:     Difficulty of Paying Living Expenses:    Food Insecurity:     Worried About Running Out of Food in the Last Year:     Ran Out of Food in the Last Year:    Transportation Needs:     Lack of Transportation (Medical):      Lack of Transportation (Non-Medical):    Physical Activity:     Days of Exercise per Week:     Minutes of Exercise per Session:    Stress:     Feeling of Stress :    Social Connections:     Frequency of Communication with Friends and Family:     Frequency of Social Gatherings with Friends and Family:     Attends Confucianist Services:     Active Member of Clubs or Organizations:     silhouette.                Medications:   Medications:    therapeutic multivitamin-minerals  1 tablet Oral Daily    atorvastatin  40 mg Oral Daily    furosemide  20 mg Oral Daily    midodrine  5 mg Oral TID    tamsulosin  0.8 mg Oral Daily    apixaban  5 mg Oral BID    sodium chloride flush  5-40 mL IntraVENous 2 times per day      Infusions:    sodium chloride      dilTIAZem (CARDIZEM) 100 mg in dextrose 5% 100 mL ([facility]) 10 mg/hr (10/17/21 0015)     PRN Meds: albuterol sulfate HFA, 2 puff, Q6H PRN  benzocaine-menthol, 1 lozenge, Q2H PRN  sodium chloride flush, 5-40 mL, PRN  sodium chloride, 25 mL, PRN  ondansetron, 4 mg, Q8H PRN   Or  ondansetron, 4 mg, Q6H PRN  polyethylene glycol, 17 g, Daily PRN  acetaminophen, 650 mg, Q6H PRN   Or  acetaminophen, 650 mg, Q6H PRN  potassium chloride, 40 mEq, PRN   Or  potassium alternative oral replacement, 40 mEq, PRN   Or  potassium chloride, 10 mEq, PRN  magnesium sulfate, 2,000 mg, PRN          Electronically signed by Destinee Dean MD on 10/17/2021 at 1:27 AM

## 2021-10-18 LAB
ALBUMIN SERPL-MCNC: 3.6 GM/DL (ref 3.4–5)
ALP BLD-CCNC: 69 IU/L (ref 40–129)
ALT SERPL-CCNC: 19 U/L (ref 10–40)
ANION GAP SERPL CALCULATED.3IONS-SCNC: 6 MMOL/L (ref 4–16)
AST SERPL-CCNC: 19 IU/L (ref 15–37)
BASOPHILS ABSOLUTE: 0 K/CU MM
BASOPHILS RELATIVE PERCENT: 0.5 % (ref 0–1)
BILIRUB SERPL-MCNC: 0.5 MG/DL (ref 0–1)
BUN BLDV-MCNC: 23 MG/DL (ref 6–23)
CALCIUM SERPL-MCNC: 8.5 MG/DL (ref 8.3–10.6)
CHLORIDE BLD-SCNC: 108 MMOL/L (ref 99–110)
CO2: 27 MMOL/L (ref 21–32)
CREAT SERPL-MCNC: 0.9 MG/DL (ref 0.9–1.3)
DIFFERENTIAL TYPE: ABNORMAL
EOSINOPHILS ABSOLUTE: 0.3 K/CU MM
EOSINOPHILS RELATIVE PERCENT: 4.5 % (ref 0–3)
GFR AFRICAN AMERICAN: >60 ML/MIN/1.73M2
GFR NON-AFRICAN AMERICAN: >60 ML/MIN/1.73M2
GLUCOSE BLD-MCNC: 109 MG/DL (ref 70–99)
HCT VFR BLD CALC: 43 % (ref 42–52)
HEMOGLOBIN: 13.6 GM/DL (ref 13.5–18)
IMMATURE NEUTROPHIL %: 0.2 % (ref 0–0.43)
LV EF: 53 %
LVEF MODALITY: NORMAL
LYMPHOCYTES ABSOLUTE: 1.3 K/CU MM
LYMPHOCYTES RELATIVE PERCENT: 24.1 % (ref 24–44)
MCH RBC QN AUTO: 29.9 PG (ref 27–31)
MCHC RBC AUTO-ENTMCNC: 31.6 % (ref 32–36)
MCV RBC AUTO: 94.5 FL (ref 78–100)
MONOCYTES ABSOLUTE: 0.6 K/CU MM
MONOCYTES RELATIVE PERCENT: 11.4 % (ref 0–4)
NUCLEATED RBC %: 0 %
PDW BLD-RTO: 13.1 % (ref 11.7–14.9)
PLATELET # BLD: 150 K/CU MM (ref 140–440)
PMV BLD AUTO: 9.9 FL (ref 7.5–11.1)
POTASSIUM SERPL-SCNC: 4.6 MMOL/L (ref 3.5–5.1)
RBC # BLD: 4.55 M/CU MM (ref 4.6–6.2)
SEGMENTED NEUTROPHILS ABSOLUTE COUNT: 3.3 K/CU MM
SEGMENTED NEUTROPHILS RELATIVE PERCENT: 59.3 % (ref 36–66)
SODIUM BLD-SCNC: 141 MMOL/L (ref 135–145)
TOTAL IMMATURE NEUTOROPHIL: 0.01 K/CU MM
TOTAL NUCLEATED RBC: 0 K/CU MM
TOTAL PROTEIN: 6.2 GM/DL (ref 6.4–8.2)
WBC # BLD: 5.5 K/CU MM (ref 4–10.5)

## 2021-10-18 PROCEDURE — 6370000000 HC RX 637 (ALT 250 FOR IP): Performed by: PHYSICIAN ASSISTANT

## 2021-10-18 PROCEDURE — 80053 COMPREHEN METABOLIC PANEL: CPT

## 2021-10-18 PROCEDURE — 93306 TTE W/DOPPLER COMPLETE: CPT

## 2021-10-18 PROCEDURE — 6370000000 HC RX 637 (ALT 250 FOR IP): Performed by: HOSPITALIST

## 2021-10-18 PROCEDURE — 6370000000 HC RX 637 (ALT 250 FOR IP): Performed by: INTERNAL MEDICINE

## 2021-10-18 PROCEDURE — 2580000003 HC RX 258: Performed by: HOSPITALIST

## 2021-10-18 PROCEDURE — 93005 ELECTROCARDIOGRAM TRACING: CPT | Performed by: PHYSICIAN ASSISTANT

## 2021-10-18 PROCEDURE — 36415 COLL VENOUS BLD VENIPUNCTURE: CPT

## 2021-10-18 PROCEDURE — 1200000000 HC SEMI PRIVATE

## 2021-10-18 PROCEDURE — 94761 N-INVAS EAR/PLS OXIMETRY MLT: CPT

## 2021-10-18 PROCEDURE — 85025 COMPLETE CBC W/AUTO DIFF WBC: CPT

## 2021-10-18 RX ORDER — SOTALOL HYDROCHLORIDE 80 MG/1
40 TABLET ORAL 2 TIMES DAILY
Status: DISCONTINUED | OUTPATIENT
Start: 2021-10-18 | End: 2021-10-19 | Stop reason: HOSPADM

## 2021-10-18 RX ADMIN — APIXABAN 5 MG: 5 TABLET, FILM COATED ORAL at 20:48

## 2021-10-18 RX ADMIN — ROSUVASTATIN CALCIUM 20 MG: 5 TABLET, COATED ORAL at 20:48

## 2021-10-18 RX ADMIN — Medication 1 TABLET: at 09:15

## 2021-10-18 RX ADMIN — SODIUM CHLORIDE, PRESERVATIVE FREE 10 ML: 5 INJECTION INTRAVENOUS at 09:16

## 2021-10-18 RX ADMIN — APIXABAN 5 MG: 5 TABLET, FILM COATED ORAL at 09:16

## 2021-10-18 RX ADMIN — FUROSEMIDE 20 MG: 20 TABLET ORAL at 09:16

## 2021-10-18 RX ADMIN — SOTALOL HYDROCHLORIDE 40 MG: 80 TABLET ORAL at 20:48

## 2021-10-18 RX ADMIN — SODIUM CHLORIDE, PRESERVATIVE FREE 10 ML: 5 INJECTION INTRAVENOUS at 20:48

## 2021-10-18 RX ADMIN — POTASSIUM CHLORIDE 20 MEQ: 1500 TABLET, EXTENDED RELEASE ORAL at 09:16

## 2021-10-18 ASSESSMENT — PAIN SCALES - GENERAL
PAINLEVEL_OUTOF10: 0
PAINLEVEL_OUTOF10: 0

## 2021-10-18 NOTE — SIGNIFICANT EVENT
SIGNIFICANT EVENT:  RAPID RESPONSE    RAPID RESPONSE was called for Ramírez Rodriguez for sinus pause, bradycardia    Patient seen and examined in ED25/ED-25. As per RN, patient was noted to have a sinus pause of 7 seconds on telemetry. When the entered the room, it was reported that patient was complaining of chest pain, weird feeling. Patient was awake, alert, oriented on my examination, complained of not feeling so good. As per the information patient had Cardizem 180 mg in the morning. Pt was evaluated by Dr Dhiraj Salazar. Cardizem was discontinued and was started on metoprolol succinate, sotalol. At 1130 patient was noted to have sinus pause on telemetry. Reviewed telemetry personally. EKG revealed sinus bradycardia with heart rate 52. Discussed with Dr. Melvina Myers monitoring the patient closely, starting the patient on dopamine drip if becomes more bradycardic. Advised to keep the patient n.p.o. Will hold metoprolol, sotalol and monitor the patient overnight. Will defer to cardiology to resume the medications after evaluation in a.m. Discussed with patient. Discussed with RN at bedside. Will start dopamine drip if patient's heart rate drops less than 50. VITALS  Vitals:    10/17/21 2354 10/18/21 0000 10/18/21 0003 10/18/21 0005   BP:   113/79    Pulse: 57 58 57    Resp:       Temp:    98.4 °F (36.9 °C)   TempSrc:    Oral   SpO2: 96% 96% 96%    Weight:       Height:              PHYSICAL EXAM   GEN did not appear to be in any acute distress  HENT moist mucous membranes  RESP clear to auscultation bilaterally  CARDIO/VASC bradycardia   NEURO  awake, alert, oriented, no focal deficits    ASSESSMENT/PLAN:    1. Sinus pause: Secondary to calcium channel blocker, beta-blocker  -Cardizem was discontinued in a.m., hold beta-blocker until evaluated by cardiology in a.m.  -Closely monitor for any further sinus pauses.     2.  Sinus bradycardia:  -Start dopamine drip if pt continue to have HR</= 50 or becomes symptomatic. 3. A fib with RVR- resolved  -continue eliquis    Due to the immediate potential for life-threatening deterioration due to bradycardia, sinus pause , I spent 30 minutes providing critical care. This time is excluding time spent performing procedures.     Electronically signed by Mor Parks MD on 10/18/2021 at 12:47 AM

## 2021-10-18 NOTE — ED NOTES
Pt states he sleeps with a cpap on, respiratory notified. Respiratory states he will look for cpap machine and to place pt on nasal cannula for now.       Magdalena Alexandra RN  10/18/21 7765

## 2021-10-18 NOTE — PROGRESS NOTES
Daily Progress Note     Pt. Awake, alert and feeling better  HR stable, NSR, BP stable  No CP, SOB today  He is feeling better  Converted back to sinus rate is stable  Pause due to meds and cardioversion  Watch for now  Amanda Morrison to d/c home  Will have him follow up in office every day for EKG  D/c on Betapace 40mg bid   Keep on [facility]   Start Betapace first dose this evening -or can admit for 48 hrs   Hx of CAD /labs are stable   Has normal EF on echo today      AF with RVR    Hx of this in the past- s/p ablation    Was on Eliquis at home    Did not tolerate BB or amio OP so they were stopped post ablation-has been off these for months and no issues until now    Started on rate control meds yesterday and had 7 second sinus pause- holding meds now- believe we gave him too much    Will start meds when safe- possibly low dose sotalol    Check EKG today    Check echo today    Significant Hx of orthostatic HOTN- did not tolerate florinef or midodrine- weaned off 2-3 weeks ago- has been doing ok- BP still drops but no dizziness or near syncope  Ok to eat this am  Will follow    PREVIOUS MEDICAL HISTORY:  He has history of CAD, hypertension,  hyperlipidemia, osteoarthritis, and AFib.     PREVIOUS SURGICAL HISTORY:  He has had hernia repair, cyst removal,  atrial fibrillation.     FAMILY HISTORY:  He has no significant family history.     SOCIAL HISTORY:  He has never smoked. He does have occasional alcohol  use and he does drink two cups of coffee daily.     MEDICATIONS:  He is Eliquis, Lasix, Lipitor, multivitamin, Norvasc, and  Protonix.     ALLERGIES:  He has no known allergies.   Objective:   /71   Pulse 57   Temp 98.4 °F (36.9 °C) (Oral)   Resp 14   Ht 5' 10\" (1.778 m)   Wt 170 lb (77.1 kg)   SpO2 99%   BMI 24.39 kg/m²     Intake/Output Summary (Last 24 hours) at 10/18/2021 0826  Last data filed at 10/17/2021 1729  Gross per 24 hour   Intake 810 ml   Output 1405 ml   Net -595 ml       Medications:   Scheduled Meds:   therapeutic multivitamin-minerals  1 tablet Oral Daily    furosemide  20 mg Oral Daily    [Held by provider] midodrine  5 mg Oral TID WC    tamsulosin  0.8 mg Oral Daily    apixaban  5 mg Oral BID    sodium chloride flush  5-40 mL IntraVENous 2 times per day    rosuvastatin  20 mg Oral Nightly    [Held by provider] sotalol  80 mg Oral BID    potassium chloride  20 mEq Oral Daily    [Held by provider] metoprolol succinate  25 mg Oral QPM      Infusions:   sodium chloride        PRN Meds:  albuterol sulfate HFA, benzocaine-menthol, sodium chloride flush, sodium chloride, ondansetron **OR** ondansetron, polyethylene glycol, acetaminophen **OR** acetaminophen, potassium chloride **OR** potassium alternative oral replacement **OR** potassium chloride, magnesium sulfate       Physical Exam:  Vitals:    10/18/21 0653   BP: 107/71   Pulse: 57   Resp:    Temp:    SpO2: 99%        General: AAO, NAD  Chest: Nontender  Cardiac: First and Second Heart Sounds are Normal, No Murmurs or Gallops noted  Lungs:Clear to auscultation and percussion. Abdomen: Soft, NT, ND, +BS  Extremities: No clubbing, no edema  Vascular:  Equal 2+ peripheral pulses. Lab Data:  CBC:   Recent Labs     10/16/21  2350 10/18/21  0525   WBC 5.9 5.5   HGB 13.0* 13.6   HCT 40.8* 43.0   MCV 92.9 94.5   * 150     BMP:   Recent Labs     10/16/21  2350 10/18/21  0525    141   K 3.5 4.6    108   CO2 22 27   BUN 30* 23   CREATININE 1.0 0.9     LIVER PROFILE:   Recent Labs     10/16/21  2350 10/18/21  0525   AST 24 19   ALT 22 19   LIPASE 31  --    BILIDIR 0.2  --    BILITOT 0.2 0.5   ALKPHOS 75 69     PT/INR:   Recent Labs     10/17/21  2209   PROTIME 15.2*   INR 1.18     APTT:   Recent Labs     10/17/21  2209   APTT 30.5     BNP:  No results for input(s): BNP in the last 72 hours.       Assessment:  Patient Active Problem List    Diagnosis Date Noted    Atrial fibrillation ([facility] [address] 10/17/2021    Status post ablation of

## 2021-10-18 NOTE — ED NOTES
2325- 7 second pause noted on patient monitor. Heart rate then bradycardia, 50s. Rapid response called. EKG completed and hospitalist at bedside.       Evelio Bailey RN  10/17/21 3451

## 2021-10-18 NOTE — ED NOTES
Dr Bekah Gagnon notified. Pt to remain NPO. Per hospitalist, notify if heart rate in 40s.       Magdalena Alexandra RN  10/17/21 8850

## 2021-10-18 NOTE — CONSULTS
[address] [facility]               [address] [facility], [address] [facility]                                  CONSULTATION    PATIENT NAME: Chalo Guerra                     :        1940  MED REC NO:   [id number]                          ROOM:       ED25  ACCOUNT NO:   [de-identified]                           ADMIT DATE: 10/16/2021  PROVIDER:     Chapin Ramirez    CONSULT DATE:  10/17/2021    INDICATION:  AFib with RVR. HISTORY OF PRESENT ILLNESS:  The patient is a 72-year-old male who  presents with AFib with RVR, heart rate in the 150s. The patient states  that he felt onset of symptoms yesterday evening of palpations and rapid  heart beat. The patient has a history of AFib with atrial fibrillation  with Dr. Harman Truong back in 2021. The patient is taking Eliquis 5 mg  t.i.d. prior to evaluation, not on any antiarrhythmic or beta blockers. He is unable to tolerate beta blockers due to orthostatic hypotension. Currently in room, his heart rate is between 80s to 100s AFib only on  oral diltiazem at this time. Previous echo done 2021 EF of 65%, mild MR at that time. In office  we did put on Holter monitor 3 days at that time he had only three  episodes at 2021. At that time, he only had three episodes of PAF  longest being 6 beats long, average heart rate was at 74 beats per  minute. He does deny any sort of lightheadedness, dizziness, or near  syncope. He does endorse slight shortness of breath, palpitations. PREVIOUS MEDICAL HISTORY:  He has history of CAD, hypertension,  hyperlipidemia, osteoarthritis, and AFib. PREVIOUS SURGICAL HISTORY:  He has had hernia repair, cyst removal,  atrial fibrillation. FAMILY HISTORY:  He has no significant family history. SOCIAL HISTORY:  He has never smoked. He does have occasional alcohol  use and he does drink two cups of coffee daily.     MEDICATIONS:  He is Eliquis, Lasix, Lipitor, multivitamin, Norvasc, and  Protonix. ALLERGIES:  He has no known allergies. REVIEW OF SYSTEMS:  Ten point review of systems is reviewed and negative  unless noted in the HPI. PHYSICAL EXAMINATION:  GENERAL:  He is awake and alert, answering questions appropriately, not  in any acute distress. VITAL SIGNS:  He is afebrile. His temp is 97.7, pulse at 102, blood  pressure 123/81. He is satting at 95% on room air. HEENT:  Head is normocephalic, atraumatic. Pupils are equal and  reactive to light. CHEST:  Equal in expansion. LUNGS:  Clear to auscultation. No wheezes, rhonchi, or rales noted. CARDIOVASCULAR:  Heart rate and rhythm irregular. ABDOMEN:  Soft, nontender. Bowel sounds are present in all four  quadrants. No hepatosplenomegaly or guarding noted. EXTREMITIES:  No clubbing, cyanosis, or edema noted. NEUROLOGICAL:  Cranial nerves II through XII are grossly intact. LABORATORY DATA:  Potassium is 3.5, creatinine is at 1, magnesium is at  2. Troponin is negative. Hemoglobin is at 13. IMPRESSION:  The patient is a 68-year-old male who presented in the  Emergency Department with new onset of AFib last night having  palpitations and feeling rapid heart rate. Denies any dizziness, near  syncope, lightheadedness. Slightly short of breath. He denies any  chest pain. The patient does have a history of atrial fibrillation with  Dr. Chelly Barnhart at 01/2021. Currently anticoagulated with Eliquis. He will  continue oral diltiazem at this time. We will plan on proceeding with  MICHAEL cardioversion tomorrow. He will be N.p.o. after midnight. We will  also obtain COVID test to make sure he is COVID-19 negative before this. This plan was discussed with Dr. Kathy Peterson.     Agree with above      Angelia Street    D: 10/17/2021 15:20:59       T: 10/17/2021 23:15:35     [id number]  Job#: [id number]     Doc#: [id number]    CC:

## 2021-10-18 NOTE — ED NOTES
Pt resting in bed, pt states he is feeling better at this time. hospitalist states to notifiy if heart rate goes below 50.      Louise Albert RN  10/18/21 1164

## 2021-10-18 NOTE — ED NOTES
Pt states that he is is still feeling \"weird and foggy. \"     Charlotte Manzo, TERESA  10/18/21 0000

## 2021-10-18 NOTE — PROGRESS NOTES
Hospitalist Progress Note      Name:  Nataliia Nicolas /Age/Sex: 1940  (80 y.o. male)   MRN & CSN:  [id number] & [id number] Admission Date/Time: 10/16/2021  9:56 PM   Location:  ED25/ED-25 PCP: Tamra Hall MD         Hospital Day: 3  Discharge barrier: New medication initiation    Assessment and Plan:   Nataliia Nicolas is a 80 y.o.  male paroxysmal A. fib s/p ablative therapy, orthostatic hypotension off AV gerald blockers who presented to the ED on 10/16/2021 on account of palpitations, lightheadedness and elevated heart rate and so was diagnosed with with Atrial fibrillation with rapid ventricular response ([address]    1. Paroxysmal A. fib with RVR, initially required Cardizem gtt. Transitioned off and started on metoprolol and sotalol  Patient spontaneously converted back to normal sinus rhythm last evening  Maintain therapeutic anticoagulation with Eliquis    2. Sinus pause: Likely due to intense AV gerald blockade within the last 24 hours-diltiazem GTT, diltiazem CD, metoprolol, sotalol  Discussed with cardiology, allow medication washout today and start low-dose sotalol hopefully tonight    3. Dyslipidemia: Stable on Crestor  4. BPH: Stable on Flomax  >50% of encounter time spent in counseling/coordination of care. Diet ADULT DIET; Regular   DVT Prophylaxis [] Lovenox, []  Heparin, [] SCDs, [] Ambulation. Eliquis twice daily   GI Prophylaxis [] PPI,  [] H2 Blocker,  [] Carafate,  [x] Diet/Tube Feeds   Code Status Full Code   Disposition Patient requires continued admission due to medication adjustment   MDM [] Low, [] Moderate,[x]  High  Patient's risk as above due to sinus pause     History of Present Illness:     Chief Complaint: Atrial fibrillation with rapid ventricular response ([address]  Nataliia Nicolas is a 80 y.o.  male  who presents with dizziness and palpitations. Patient is seen and examined, he is feeling much improved today.   He has been told by the cardiologist that he would like to watch him another day. He is okay with this. Ten point ROS reviewed negative, unless as noted above    Objective: Intake/Output Summary (Last 24 hours) at 10/18/2021 1344  Last data filed at 10/18/2021 1137  Gross per 24 hour   Intake 100 ml   Output 1105 ml   Net -1005 ml        Vitals:   Vitals:    10/18/21 1000 10/18/21 1030 10/18/21 1100 10/18/21 1120   BP:    132/80   Pulse: 58 62 63 70   Resp:       Temp:       TempSrc:       SpO2:    100%   Weight:       Height:            Physical Exam:   GEN: Awake male, alert and oriented x3 in no apparent distress. Appears given age. HEENT: Normal   RESP: Clear lung fields bilaterally. Symmetric chest movement while on room air  CVS: RRR, S1-S2  GI/: Abdomen is soft, nontender, no organomegaly. . Bowel sounds normal, rectal exam deferred. No CVA tenderness. MSK: No gross joint deformities. No tenderness  SKIN: Normal coloration, warm, dry. NEURO: Cranial nerves appear grossly intact, normal speech, no lateralizing weakness.   PSYCH:  Affect appropriate    Medications:   Medications:    sotalol  40 mg Oral BID    therapeutic multivitamin-minerals  1 tablet Oral Daily    furosemide  20 mg Oral Daily    tamsulosin  0.8 mg Oral Daily    apixaban  5 mg Oral BID    sodium chloride flush  5-40 mL IntraVENous 2 times per day    rosuvastatin  20 mg Oral Nightly    potassium chloride  20 mEq Oral Daily      Infusions:    sodium chloride       PRN Meds: albuterol sulfate HFA, 2 puff, Q6H PRN  benzocaine-menthol, 1 lozenge, Q2H PRN  sodium chloride flush, 5-40 mL, PRN  sodium chloride, 25 mL, PRN  ondansetron, 4 mg, Q8H PRN   Or  ondansetron, 4 mg, Q6H PRN  polyethylene glycol, 17 g, Daily PRN  acetaminophen, 650 mg, Q6H PRN   Or  acetaminophen, 650 mg, Q6H PRN  potassium chloride, 40 mEq, PRN   Or  potassium alternative oral replacement, 40 mEq, PRN   Or  potassium chloride, 10 mEq, PRN  magnesium sulfate, 2,000 mg, PRN          Electronically signed by Bety Coyle MD on 10/18/2021 at 1:44 PM

## 2021-10-18 NOTE — ED PROVIDER NOTES
12 lead EKG per my interpretation:  Normal Sinus Rhythm 62  Axis is   Left axis deviation  QTc is  440  There is specific T wave changes appreciated. Inverted T waves III  There is no specific ST wave changes appreciated.     Prior EKG to compare with was not available         Dominguez Browne DO  10/18/21 1052

## 2021-10-18 NOTE — ED NOTES
Report called to [facility]  [facility], RN  10/18/21 0651

## 2021-10-19 VITALS
RESPIRATION RATE: 12 BRPM | HEART RATE: 79 BPM | WEIGHT: 166.56 LBS | TEMPERATURE: 97.9 F | HEIGHT: 70 IN | SYSTOLIC BLOOD PRESSURE: 91 MMHG | BODY MASS INDEX: 23.84 KG/M2 | DIASTOLIC BLOOD PRESSURE: 64 MMHG | OXYGEN SATURATION: 96 %

## 2021-10-19 PROCEDURE — 6370000000 HC RX 637 (ALT 250 FOR IP): Performed by: PHYSICIAN ASSISTANT

## 2021-10-19 PROCEDURE — 2580000003 HC RX 258: Performed by: HOSPITALIST

## 2021-10-19 PROCEDURE — 6370000000 HC RX 637 (ALT 250 FOR IP): Performed by: INTERNAL MEDICINE

## 2021-10-19 PROCEDURE — 94761 N-INVAS EAR/PLS OXIMETRY MLT: CPT

## 2021-10-19 PROCEDURE — 6370000000 HC RX 637 (ALT 250 FOR IP): Performed by: HOSPITALIST

## 2021-10-19 PROCEDURE — 93005 ELECTROCARDIOGRAM TRACING: CPT | Performed by: INTERNAL MEDICINE

## 2021-10-19 RX ORDER — SOTALOL HYDROCHLORIDE 80 MG/1
40 TABLET ORAL 2 TIMES DAILY
Qty: 60 TABLET | Refills: 0 | Status: SHIPPED | OUTPATIENT
Start: 2021-10-19

## 2021-10-19 RX ORDER — TAMSULOSIN HYDROCHLORIDE 0.4 MG/1
0.8 CAPSULE ORAL DAILY
Qty: 30 CAPSULE | Refills: 0 | Status: SHIPPED | OUTPATIENT
Start: 2021-10-19 | End: 2022-02-10 | Stop reason: CLARIF

## 2021-10-19 RX ADMIN — SOTALOL HYDROCHLORIDE 40 MG: 80 TABLET ORAL at 09:18

## 2021-10-19 RX ADMIN — APIXABAN 5 MG: 5 TABLET, FILM COATED ORAL at 09:18

## 2021-10-19 RX ADMIN — Medication 1 TABLET: at 09:19

## 2021-10-19 RX ADMIN — POTASSIUM CHLORIDE 20 MEQ: 1500 TABLET, EXTENDED RELEASE ORAL at 09:18

## 2021-10-19 RX ADMIN — SODIUM CHLORIDE, PRESERVATIVE FREE 10 ML: 5 INJECTION INTRAVENOUS at 09:18

## 2021-10-19 ASSESSMENT — PAIN SCALES - GENERAL
PAINLEVEL_OUTOF10: 0

## 2021-10-19 NOTE — DISCHARGE SUMMARY
Physician Discharge Summary     Patient ID:  Cynthiaadalupe Eisenmenger  [id number]  75 y.o.  1940    Admit date: 10/16/2021    Discharge date and time: No discharge date for patient encounter. Admitting Physician: Tanner Rubin MD     Discharge Physician: Charan Menchaca    Admission Diagnoses: Atrial fibrillation [facility]) [I48.91]  Atrial fibrillation with rapid ventricular response ([facility] [address] [I48.91]    Discharge Diagnoses: Afib with RVR                                         Sinus pause due to cardiovestion                                         HPL                                         BPH                                         Hx of orthostasis    Admission Condition: fair    Discharged Condition: good    Indication for Admission: afib    Hospital Course:    80 y.o.  male  who presents with palpitations. He checked his heart rate with the pulse oximeter at home and he was around 170s. He decided to check into the ER. Twelve-lead ECG confirmed atrial fibrillation with rapid ventricular rate around 150 bpm.  He denies any chest pain or shortness of breath. Denies any dizziness or headache or vision loss. He denies any dysuria hematuria melena hematochezia he denies any lower limb swelling. Denies any fever chills rigors. Denies any dysuria hematuria melena hematochezia. He is status post ablation with Dr. Jeaneth Lepe in June and has been doing well. He was not on any rate controlling medication. In the ER his chemistries revealed sodium 141, potassium 3.5, chloride 110, bicarb 22, platelet 30 creatinine 1, anion gap 9 magnesium 2, lactate 0.9 glucose 170 calcium 8.3 total protein 6.4. Troponin T less than 0.010. Liver function profile within normal limits. .  Glucose 170. He will\" revealed WBC 5.9 hemoglobin 13 hematocrit 40.8 and platelet count of 309. Chest reveals confluent opacities with enlarged cardiac silhouette. Patient would be admitted to the hospitalist service. Continue intravenous Cardizem. Consult Dr. David Lee MICHAEL and cardioversion. He was admitted for Afib with RVR and placed on cardiazem drip with eliquis and then placed on sotalol and converted and had a sinus pause due to this. Echo with normal EF. He waas stable and recommended to follow up at cardiologist office  For next 2 days  for EKG as on sotalol to ensure QTC is stable and he understood. He is no longer on midodrine as was weaned off prior to his admission. No dizziness on ambulation. Consults: cardiology        Outstanding Order Results     Date and Time Order Name Status Description    10/19/2021  8:17 AM EKG 12 Lead Preliminary     10/18/2021 10:43 AM EKG 12 Lead Preliminary     10/17/2021 11:30 PM EKG 12 Lead Preliminary               Discharge Exam:  Vital signs: (most recent): Blood pressure 91/64, pulse 79, temperature 97.9 °F (36.6 °C), temperature source Oral, resp. rate 12, height 5' 10\" (1.778 m), weight 166 lb 9 oz (75.6 kg), SpO2 96 %. (BP high 90s and asymptomatic). HEENT: Normal HEENT exam.    Lungs:  Normal effort. Heart: Normal rate. Abdomen: Abdomen is soft.       Disposition: home    Patient Instructions:      Medication List      START taking these medications    sotalol 80 MG tablet  Commonly known as: BETAPACE  Take 0.5 tablets by mouth 2 times daily        CONTINUE taking these medications    albuterol sulfate  (90 Base) MCG/ACT inhaler  Inhale 2 puffs into the lungs every 6 hours as needed for Wheezing     Eliquis 5 MG Tabs tablet  Generic drug: apixaban  TAKE 1 TABLET BY MOUTH TWICE A DAY     rosuvastatin 20 MG tablet  Commonly known as: CRESTOR     tamsulosin 0.4 MG capsule  Commonly known as: FLOMAX  Take 2 capsules by mouth daily     therapeutic multivitamin-minerals tablet        STOP taking these medications    amLODIPine 5 MG tablet  Commonly known as: NORVASC     furosemide 20 MG tablet  Commonly known as: LASIX           Where to Get Your Medications      You can get these medications from any pharmacy    Bring a paper prescription for each of these medications  · sotalol 80 MG tablet  · tamsulosin 0.4 MG capsule         Activity: activity as tolerated  Diet: cardiac diet  Wound Care: none needed    Follow-up with PCP  Discharge time 35min.     Signed:  Hudson Wheeler MD  10/19/2021  4:29 PM

## 2021-10-19 NOTE — PROGRESS NOTES
Daily Progress Note    Pt. Awake, alert and feeling better  HR stable, NSR, BP low but stable today  No CP, SOB today    Awake alert feeling better  No tram events  Ok for home--keep on AC and low dose Betapace   Has some dizziness --hx of ortho BP--so avoid BP  meds  QT is ok from today ekg  Keep on [facility] [place] for afib      AF with RVR    Hx of this in the past- s/p ablation    Was on Eliquis at home    Did not tolerate BB or amio OP so they were stopped post ablation-has been off these for months and no issues until now    Started on rate control meds yesterday and had 7 second sinus pause- holding meds now- believe we gave him too much    Started on low dose sotalol last night- QT stable today- ok to give again    Echo stable     Significant Hx of orthostatic HOTN- did not tolerate florinef or midodrine- weaned off 2-3 weeks ago- has been doing ok- BP still drops but no dizziness or near syncope-stop his lasix today as BP is borderline low  Increase activity-if does ok with this likely ok to D/C to home today- if D/C pt. Needs to come to office each of next 2 days for serial EKG's    Echo-10/18/21  Summary   Left ventricular systolic function is normal.   Ejection fraction is visually estimated at 50-55%. Grade II diastolic dysfunction. Mild mitral regurgitation. No evidence of any pericardial effusion. Technically difficult study, due to body habitus.     PREVIOUS MEDICAL HISTORY: Munira Ferris has history of CAD, hypertension,  hyperlipidemia, osteoarthritis, and AFib.     PREVIOUS SURGICAL HISTORY: Munira Ferris has had hernia repair, cyst removal,  atrial fibrillation.     FAMILY HISTORY: Munira Ferris has no significant family history.     SOCIAL HISTORY: Munira Ferris has never smoked. Munira Ferris does have occasional alcohol  use and he does drink two cups of coffee daily.     MEDICATIONS:  He is Eliquis, Lasix, Lipitor, multivitamin, Norvasc, and  Protonix.     ALLERGIES:  He has no known allergies.       Objective:   BP 91/64   Pulse 79   Temp 97.9 °F (36.6 °C) (Oral)   Resp 12   Ht 5' 10\" (1.778 m)   Wt 166 lb 9 oz (75.6 kg)   SpO2 96%   BMI 23.90 kg/m²     Intake/Output Summary (Last 24 hours) at 10/19/2021 0040  Last data filed at 10/18/2021 1658  Gross per 24 hour   Intake --   Output 1175 ml   Net -1175 ml       Medications:   Scheduled Meds:   sotalol  40 mg Oral BID    therapeutic multivitamin-minerals  1 tablet Oral Daily    tamsulosin  0.8 mg Oral Daily    apixaban  5 mg Oral BID    sodium chloride flush  5-40 mL IntraVENous 2 times per day    rosuvastatin  20 mg Oral Nightly    potassium chloride  20 mEq Oral Daily      Infusions:   sodium chloride        PRN Meds:  albuterol sulfate HFA, benzocaine-menthol, sodium chloride flush, sodium chloride, ondansetron **OR** ondansetron, polyethylene glycol, acetaminophen **OR** acetaminophen, potassium chloride **OR** potassium alternative oral replacement **OR** potassium chloride, magnesium sulfate       Physical Exam:  Vitals:    10/19/21 0915   BP:    Pulse: 79   Resp:    Temp:    SpO2:         General: AAO, NAD  Chest: Nontender  Cardiac: First and Second Heart Sounds are Normal, No Murmurs or Gallops noted  Lungs:Clear to auscultation and percussion. Abdomen: Soft, NT, ND, +BS  Extremities: No clubbing, no edema  Vascular:  Equal 2+ peripheral pulses.         Lab Data:  CBC:   Recent Labs     10/16/21  2350 10/18/21  0525   WBC 5.9 5.5   HGB 13.0* 13.6   HCT 40.8* 43.0   MCV 92.9 94.5   * 150     BMP:   Recent Labs     10/16/21  2350 10/18/21  0525    141   K 3.5 4.6    108   CO2 22 27   BUN 30* 23   CREATININE 1.0 0.9     LIVER PROFILE:   Recent Labs     10/16/21  2350 10/18/21  0525   AST 24 19   ALT 22 19   LIPASE 31  --    BILIDIR 0.2  --    BILITOT 0.2 0.5   ALKPHOS 75 69     PT/INR:   Recent Labs     10/17/21  2209   PROTIME 15.2*   INR 1.18     APTT:   Recent Labs     10/17/21  2209   APTT 30.5     BNP:  No results for input(s): BNP in the last 72 hours.      Assessment:  Patient Active Problem List    Diagnosis Date Noted    [facility] 10/17/2021    Status post ablation of atrial flutter 07/27/2021    Erythema 07/27/2021    S/P ablation of atrial fibrillation 01/21/2021    Atrial fibrillation with rapid ventricular response ([facility] [address] 12/16/2020    LUCILLE on CPAP 10/13/2020    [facility]fib ([facility] 03/27/2020       Electronically signed by Nadeem Winters PA-C on 10/19/2021 at 9:38 AM     Electronically signed by Nisha Crerato MD on 10/19/21 at 10:17 AM EDT

## 2021-10-19 NOTE — PROGRESS NOTES
Progress Note  Date:10/19/2021       Room:[address] [place] [address]-A  Patient Anitra Garcias     YOB: 1940     Age:81 y.o. States feels fine  Subjective    Subjective:  Symptoms:  Stable. Diet:  Adequate intake. Pain:  He reports no pain. Review of Systems  Objective         Vitals Last 24 Hours:  TEMPERATURE:  Temp  Av.8 °F (36.6 °C)  Min: 97.6 °F (36.4 °C)  Max: 98 °F (36.7 °C)  RESPIRATIONS RANGE: Resp  Av.8  Min: 16  Max: 23  PULSE OXIMETRY RANGE: SpO2  Av.4 %  Min: 96 %  Max: 100 %  PULSE RANGE: Pulse  Av.1  Min: 57  Max: 82  BLOOD PRESSURE RANGE: Systolic (75FOC), EFP:092 , Min:104 , YQU:793   ; Diastolic (42XUE), TIQ:43, Min:76, Max:93    I/O (24Hr): Intake/Output Summary (Last 24 hours) at 10/19/2021 0840  Last data filed at 10/18/2021 1658  Gross per 24 hour   Intake --   Output 1175 ml   Net -1175 ml     Objective:  General Appearance:  Comfortable. Vital signs: (most recent): Blood pressure 91/64, pulse 79, temperature 97.9 °F (36.6 °C), temperature source Oral, resp. rate 12, height 5' 10\" (1.778 m), weight 166 lb 9 oz (75.6 kg), SpO2 96 %. (BP high 90s and asymptomatic). HEENT: Normal HEENT exam.    Lungs:  Normal effort. Heart: Normal rate. Abdomen: Abdomen is soft.       Labs/Imaging/Diagnostics    Labs:  CBC:  Recent Labs     10/16/21  2350 10/18/21  0525   WBC 5.9 5.5   RBC 4.39* 4.55*   HGB 13.0* 13.6   HCT 40.8* 43.0   MCV 92.9 94.5   RDW 12.8 13.1   * 150     CHEMISTRIES:  Recent Labs     10/16/21  2350 10/18/21  0525    141   K 3.5 4.6    108   CO2 22 27   BUN 30* 23   CREATININE 1.0 0.9   GLUCOSE 170* 109*   MG 2.0  --      PT/INR:  Recent Labs     10/17/21  2209   PROTIME 15.2*   INR 1.18     APTT:  Recent Labs     10/17/21  2209   APTT 30.5     LIVER PROFILE:  Recent Labs     10/16/21  2350 10/18/21  0525   AST 24 19   ALT 22 19   BILIDIR 0.2  --    BILITOT 0.2 0.5   ALKPHOS 75 69       Imaging Last 24 Hours:  Echocardiogram complete 2D with doppler with color    Result Date: 10/18/2021  Transthoracic Echocardiography Report (TTE)  Demographics   Patient Name       Sushma Reaves    Date of Study       10/18/2021   Date of Birth      1940        Gender              Male   Age                80 year(s)        Race                   Patient Number     [id number]        Room Number         ED25   Visit Number       [id number]   Corporate ID       [id number]   Accession Number   [id number]        Leesa Gaspar RDMS, RVT   Ordering Physician Tiffanie Monk MD  Interpreting        Tiffanie Monk MD                                       Physician  Procedure Type of Study   TTE procedure:ECHOCARDIOGRAM COMPLETE 2D W DOPPLER W COLOR. Procedure Date Date: 10/18/2021 Start: 08:37 AM Study Location: Portable Technical Quality: Fair visualization Indications:Atrial fibrillation. Patient Status: Routine Height: 70 inches Weight: 170 pounds BSA: 1.95 m2 BMI: 24.39 kg/m2 HR: 61 bpm BP: 107/71 mmHg  Conclusions   Summary  Left ventricular systolic function is normal.  Ejection fraction is visually estimated at 50-55%. Grade II diastolic dysfunction. Mild mitral regurgitation. No evidence of any pericardial effusion. Technically difficult study, due to body habitus. Signature   ------------------------------------------------------------------  Electronically signed by Tiffanie Monk MD (Interpreting  physician) on 10/18/2021 at 11:28 AM  ------------------------------------------------------------------   Findings   Left Ventricle  Left ventricular systolic function is normal.  Ejection fraction is visually estimated at 50-55%. Grade II diastolic dysfunction. No regional wall motion abnormalites. Left Atrium  Essentially normal left atrium. Right Atrium  Essentially normal right atrium.    Right Ventricle  Essentially normal right ventricle. Aortic Valve  Mild aortic regurgitation is noted. Mitral Valve  Mild mitral regurgitation. Tricuspid Valve  Trace tricuspid regurgitation; normal RVSP. Pulmonic Valve  The pulmonic valve was not well visualized. Pericardial Effusion  No evidence of any pericardial effusion. Pleural Effusion  No evidence of pleural effusion. Miscellaneous  IVC and abdominal aorta are within normal limits.   M-Mode/2D Measurements & Calculations   LV Diastolic Dimension:  LV Systolic Dimension:  LA Dimension: 3.1 cmAO Root  3.91 cm                  2.9 cm                  Dimension: 3 cmLA Area: 16  LV FS:25.8 %             LV Volume Diastolic: 50 cm2  LV PW Diastolic: 3.15 cm ml  LV PW Systolic: 4.20 cm  LV Volume Systolic: 25  Septum Diastolic: 0.08   ml  cm                       LV EDV/LV EDV Index: 50 RV Diastolic Dimension:  Septum Systolic: 6.18 cm CN/61 Z9CH ESV/LV ESV   2.95 cm  CO: 2.64 l/min           Index: 25 ml/13 m2  CI: 1.35 l/m*m2          EF Calculated (A4C): 50 LA/Aorta: 1.03                           %  LV Area Diastolic: 46.2  EF Calculated (2D):     LA volume/Index: 38 ml  cm2                      51.4 %                  /67R1  LV Area Systolic: 39.6  cm2                      LV Length: 7.24 cm                            LVOT: 1.9 cm  Doppler Measurements & Calculations   MV Peak E-Wave: 122  AV Peak Velocity: 152 cm/s    LVOT Peak Velocity: 73.7  cm/s                 AV Peak Gradient: 9.24 mmHg   cm/s  MV Peak A-Wave: 110  AV Mean Velocity: 116 cm/s    LVOT Mean Velocity: 54.9  cm/s                 AV Mean Gradient: 6 mmHg      cm/s  MV E/A Ratio: 1.11   AV VTI: 32.1 cm               LVOT Peak Gradient: 2  MV Peak Gradient:    AV Area (Continuity):1.35 cm2 mmHgLVOT Mean Gradient: 1  5.95 mmHg                                          mmHg                       LVOT VTI: 15.3 cm  MV P1/2t: 112 msec  MVA by PHT:1.96 cm2                                                     TR Velocity:174 cm/s  MV E' Septal                                       TR Gradient:12.11 mmHg  Velocity: 5.59 cm/s  MV E' Lateral  Velocity: 8.55 cm/s  MV E/E' septal:  21.82  MV E/E' lateral:  14.27      Assessment//Plan           Hospital Problems         Last Modified POA    * (Principal) Atrial fibrillation with rapid ventricular response ([address] 10/18/2021 Yes    Atrial fibrillation ([address] 10/18/2021 Yes        Assessment & Plan  Afib with RVR(resolved)  -cardiazem drip and off and on BB  -eliquis  -echo grade 2 diastolic dysfunciton witn omral EF   sinus pause   -due to meds and cardioversion  -On low dose sotalol  HPl  -Statin  BPH  -flomax  Orthostatic HTN  -did not tolerate florinef or midodrin  -weaned off 3 wks and and BP drops but no symptomatic    Discharge once cardio clears      Electronically signed by Gilda Mario MD on 10/19/21 at 8:40 AM EDT

## 2021-10-19 NOTE — CARE COORDINATION
.Chart reviewed. The patient is to return home with wife at discharge. The patient has a PCP and insurance and can drive to doctor's appointments without issues. The patient can afford and take his medications as prescribed. The patient does not require HHC or any DME and is independent in ADL's. The patient denies any other needs at this time.

## 2021-10-20 LAB
EKG ATRIAL RATE: 54 BPM
EKG ATRIAL RATE: 62 BPM
EKG ATRIAL RATE: 73 BPM
EKG DIAGNOSIS: NORMAL
EKG P AXIS: -25 DEGREES
EKG P AXIS: 26 DEGREES
EKG P AXIS: 6 DEGREES
EKG P-R INTERVAL: 118 MS
EKG P-R INTERVAL: 134 MS
EKG P-R INTERVAL: 138 MS
EKG Q-T INTERVAL: 404 MS
EKG Q-T INTERVAL: 434 MS
EKG Q-T INTERVAL: 446 MS
EKG QRS DURATION: 82 MS
EKG QRS DURATION: 84 MS
EKG QRS DURATION: 86 MS
EKG QTC CALCULATION (BAZETT): 422 MS
EKG QTC CALCULATION (BAZETT): 440 MS
EKG QTC CALCULATION (BAZETT): 445 MS
EKG R AXIS: -28 DEGREES
EKG R AXIS: -29 DEGREES
EKG R AXIS: -32 DEGREES
EKG T AXIS: -29 DEGREES
EKG T AXIS: -34 DEGREES
EKG T AXIS: -39 DEGREES
EKG VENTRICULAR RATE: 54 BPM
EKG VENTRICULAR RATE: 62 BPM
EKG VENTRICULAR RATE: 73 BPM

## 2021-10-20 PROCEDURE — 93010 ELECTROCARDIOGRAM REPORT: CPT | Performed by: INTERNAL MEDICINE

## 2021-10-25 NOTE — PROGRESS NOTES
Physician Progress Note      Rick Torres  CSN #:                  [id number]  :                       1940  ADMIT DATE:       10/16/2021 9:56 PM  [address] [place] DATE:        10/19/2021 4:31 PM  RESPONDING  PROVIDER #:        Tracy Vega MD          QUERY TEXT:    Pt admitted with paroxysmal afib. Pt noted to have a sinus pause s/p   cardioversion on 10/18. If possible, please document in progress notes and   discharge summary the relationship, if any, between sinus pause and   cardioversion/medications. The medical record reflects the following:  Risk Factors: PAF  Clinical Indicators: 7 second sinus pause. Per cardiology progress note on   10/18, \"Pause due to meds and cardioversion\" and on 10/19 \"Started on rate   control meds yesterday and had 7 second sinus pause- holding meds now- believe   we gave him too much \"  Treatment: Cardizem gtt, Eliquis, Sotalol, cardiology consult    JOHNATHON KeithN, RN  Clinical   [phone]  Options provided:  -- Sinus pause due to complication of cardioversion  -- Sinus pause due to expected from cardioversion  -- Sinus pause due to adverse effect of antiarrhythmic medications  -- Other - I will add my own diagnosis  -- Disagree - Not applicable / Not valid  -- Disagree - Clinically unable to determine / Unknown  -- Refer to Clinical Documentation Reviewer    PROVIDER RESPONSE TEXT:    Sinus pause is the adverse effect of antiarrhythmic medications.     Query created by: Bud Olmstead on 10/25/2021 3:25 PM      Electronically signed by:  Tracy Vega MD 10/25/2021 3:30 PM

## 2021-10-28 RX ORDER — APIXABAN 5 MG/1
TABLET, FILM COATED ORAL
Qty: 60 TABLET | Refills: 5 | Status: SHIPPED | OUTPATIENT
Start: 2021-10-28

## 2021-11-02 NOTE — PROGRESS NOTES
Physician Progress Note      PATIENTTaguzman GIRON #:                  [id number]  :                       1940  ADMIT DATE:       10/16/2021 9:56 PM  [address] [place] DATE:        10/19/2021 4:31 PM  RESPONDING  PROVIDER #:        Trinity Shaw MD          QUERY TEXT:    Pt admitted with PAF. If possible, please document in progress notes and   discharge summary further specificity regarding the type and acuity of CHF:    The medical record reflects the following:  Risk Factors: HTN, CAD  Clinical Indicators: Echo on 10/18: Left ventricular systolic function is   normal. Ejection fraction is visually estimated at 50-55%. Grade II diastolic dysfunction. No history of CHF documented. Treatment: labs, echo, cardiology consult, weights, I&O's, po Lasix, Toprol XL    Daryle Spruce, BSN, RN  Clinical   [phone]  Options provided:  -- Chronic Diastolic CHF/HFpEF  -- CHF ruled out after further study  -- Other - I will add my own diagnosis  -- Disagree - Not applicable / Not valid  -- Disagree - Clinically unable to determine / Unknown  -- Refer to Clinical Documentation Reviewer    PROVIDER RESPONSE TEXT:    CHF was ruled out after further study.     Query created by: Jhonny Young on 10/27/2021 8:34 AM      Electronically signed by:  Trinity Shaw MD 2021 7:50 AM

## 2021-11-11 ENCOUNTER — APPOINTMENT (RX ONLY)
Dept: URBAN - METROPOLITAN AREA CLINIC 377 | Facility: CLINIC | Age: 81
Setting detail: DERMATOLOGY
End: 2021-11-11

## 2021-11-11 DIAGNOSIS — Z71.89 OTHER SPECIFIED COUNSELING: ICD-10-CM

## 2021-11-11 DIAGNOSIS — L81.4 OTHER MELANIN HYPERPIGMENTATION: ICD-10-CM | Status: STABLE

## 2021-11-11 DIAGNOSIS — D18.0 HEMANGIOMA: ICD-10-CM | Status: STABLE

## 2021-11-11 DIAGNOSIS — L57.0 ACTINIC KERATOSIS: ICD-10-CM | Status: INADEQUATELY CONTROLLED

## 2021-11-11 DIAGNOSIS — L82.1 OTHER SEBORRHEIC KERATOSIS: ICD-10-CM | Status: STABLE

## 2021-11-11 DIAGNOSIS — Z85.828 PERSONAL HISTORY OF OTHER MALIGNANT NEOPLASM OF SKIN: ICD-10-CM

## 2021-11-11 DIAGNOSIS — D22 MELANOCYTIC NEVI: ICD-10-CM | Status: STABLE

## 2021-11-11 DIAGNOSIS — L82.0 INFLAMED SEBORRHEIC KERATOSIS: ICD-10-CM | Status: INADEQUATELY CONTROLLED

## 2021-11-11 PROBLEM — D18.01 HEMANGIOMA OF SKIN AND SUBCUTANEOUS TISSUE: Status: ACTIVE | Noted: 2021-11-11

## 2021-11-11 PROBLEM — D22.5 MELANOCYTIC NEVI OF TRUNK: Status: ACTIVE | Noted: 2021-11-11

## 2021-11-11 PROCEDURE — 17000 DESTRUCT PREMALG LESION: CPT | Mod: 59

## 2021-11-11 PROCEDURE — ? COUNSELING

## 2021-11-11 PROCEDURE — ? FULL BODY SKIN EXAM

## 2021-11-11 PROCEDURE — 17110 DESTRUCTION B9 LES UP TO 14: CPT

## 2021-11-11 PROCEDURE — ? LIQUID NITROGEN

## 2021-11-11 PROCEDURE — ? SUNSCREEN TREATMENT REGIMEN

## 2021-11-11 PROCEDURE — 99213 OFFICE O/P EST LOW 20 MIN: CPT | Mod: 25

## 2021-11-11 ASSESSMENT — LOCATION SIMPLE DESCRIPTION DERM
LOCATION SIMPLE: ABDOMEN
LOCATION SIMPLE: UPPER BACK
LOCATION SIMPLE: LEFT TEMPLE
LOCATION SIMPLE: LEFT UPPER BACK

## 2021-11-11 ASSESSMENT — LOCATION DETAILED DESCRIPTION DERM
LOCATION DETAILED: LEFT INFERIOR TEMPLE
LOCATION DETAILED: LEFT RIB CAGE
LOCATION DETAILED: LEFT SUPERIOR UPPER BACK
LOCATION DETAILED: LEFT SUPERIOR MEDIAL UPPER BACK
LOCATION DETAILED: SUPERIOR THORACIC SPINE
LOCATION DETAILED: LEFT MEDIAL UPPER BACK

## 2021-11-11 ASSESSMENT — LOCATION ZONE DERM
LOCATION ZONE: FACE
LOCATION ZONE: TRUNK

## 2021-11-11 NOTE — PROCEDURE: LIQUID NITROGEN
Render Note In Bullet Format When Appropriate: No
Detail Level: Detailed
Show Aperture Variable?: Yes
Duration Of Freeze Thaw-Cycle (Seconds): 6
Number Of Freeze-Thaw Cycles: 1 freeze-thaw cycle
Consent: The patient's consent was obtained including but not limited to risks of crusting, scabbing, blistering, scarring, darker or lighter pigmentary change, recurrence, incomplete removal and infection.
Post-Care Instructions: I reviewed with the patient in detail post-care instructions. Patient is to wear sunprotection, and avoid picking at any of the treated lesions. Pt may apply Vaseline to crusted or scabbing areas.
Medical Necessity Information: It is in your best interest to select a reason for this procedure from the list below. All of these items fulfill various CMS LCD requirements except the new and changing color options.
Medical Necessity Clause: This procedure was medically necessary because the lesions that were treated were:

## 2022-01-12 ENCOUNTER — HOSPITAL ENCOUNTER (EMERGENCY)
Age: 82
Discharge: HOME | End: 2022-01-12
Attending: EMERGENCY MEDICINE
Payer: MEDICARE

## 2022-01-12 VITALS
OXYGEN SATURATION: 97 % | BODY MASS INDEX: 24.34 KG/M2 | DIASTOLIC BLOOD PRESSURE: 89 MMHG | HEART RATE: 78 BPM | WEIGHT: 170 LBS | HEIGHT: 70 IN | TEMPERATURE: 98.1 F | RESPIRATION RATE: 16 BRPM | SYSTOLIC BLOOD PRESSURE: 147 MMHG

## 2022-01-12 DIAGNOSIS — H11.32 SUBCONJUNCTIVAL HEMORRHAGE OF LEFT EYE: Primary | ICD-10-CM

## 2022-01-12 DIAGNOSIS — H11.422 CHEMOSIS OF CONJUNCTIVA, LEFT: ICD-10-CM

## 2022-01-12 PROCEDURE — 6370000000 HC RX 637 (ALT 250 FOR IP): Performed by: STUDENT IN AN ORGANIZED HEALTH CARE EDUCATION/TRAINING PROGRAM

## 2022-01-12 PROCEDURE — 99284 EMERGENCY DEPT VISIT MOD MDM: CPT

## 2022-01-12 RX ORDER — POLYVINYL ALCOHOL 14 MG/ML
1 SOLUTION/ DROPS OPHTHALMIC PRN
Qty: 15 ML | Refills: 0 | Status: SHIPPED | OUTPATIENT
Start: 2022-01-12 | End: 2022-02-10 | Stop reason: CLARIF

## 2022-01-12 RX ORDER — TETRACAINE HYDROCHLORIDE 5 MG/ML
2 SOLUTION OPHTHALMIC ONCE
Status: COMPLETED | OUTPATIENT
Start: 2022-01-12 | End: 2022-01-12

## 2022-01-12 RX ADMIN — TETRACAINE HYDROCHLORIDE 2 DROP: 5 SOLUTION OPHTHALMIC at 11:52

## 2022-01-12 ASSESSMENT — PAIN DESCRIPTION - FREQUENCY: FREQUENCY: CONTINUOUS

## 2022-01-12 ASSESSMENT — PAIN DESCRIPTION - LOCATION: LOCATION: EYE

## 2022-01-12 ASSESSMENT — PAIN DESCRIPTION - DESCRIPTORS: DESCRIPTORS: CONSTANT

## 2022-01-12 ASSESSMENT — PAIN DESCRIPTION - PAIN TYPE: TYPE: ACUTE PAIN

## 2022-01-12 ASSESSMENT — PAIN SCALES - GENERAL: PAINLEVEL_OUTOF10: 8

## 2022-01-12 ASSESSMENT — PAIN DESCRIPTION - ORIENTATION: ORIENTATION: LEFT

## 2022-01-12 ASSESSMENT — VISUAL ACUITY
OU: 20/25
OS: 20/25
OD: 20/25

## 2022-01-12 NOTE — ED NOTES
Discharge instructions reviewed with pt. All questions answered at this time. Pt verbalized understanding.       Eliza Ko RN  01/12/22 1948

## 2022-01-12 NOTE — ED NOTES
Pt updated to to plan of care, awaiting ophthalmology consult     Leonardo Munoz RN  01/12/22 1802 James Castellanos, [facility]  01/12/22 9860

## 2022-01-12 NOTE — ED PROVIDER NOTES
NAME: Gertrude Lucero   YOB: 1940   MEDICAL NUMBER: [id number]  PRIMARY CARE Haris Almonte MD  DATE  [unfilled]    [facility]   Chief Complaint   Patient presents with    Eye Problem     Blood in eye, no injury,          HPI:  Gertrude Lucero is a 80y.o. year old male who presents to the ED today with a complaint swelling and pain to L eye. Awoke with red, swollen left eye. Pain with movement of L eye. Notes somewhat blurry vision of R eye, but can still make everything out. Does wear glasses at baseline. Denies any trauma to the eye. Has been coughing intermittently, but no particularly aggressive episode he can thing of. He is on Eliquis for Afib and has not missed any doses. REVIEW OF SYSTEMS    Constitutional:  Denies fever, chills, weight loss or weakness   HENT:  Denies sore throat or ear pain. Notes pain and blurry vision in L eye. Cardiovascular:  Denies chest pain, palpitations   Respiratory:  Denies cough or shortness of breath    GI:  Denies abdominal pain, nausea, vomiting, or diarrhea  :  Denies any urinary symptoms or vaginal symptoms. Musculoskeletal:  Denies back pain,   Skin:  Denies rash  Neurologic:  Denies headache, focal weakness or sensory changes   Endocrine:  Denies polyuria or polydypsia   Lymphatic:  Denies swollen glands     ALLERGIES  No Known Allergies    MEDICATIONS  No current facility-administered medications for this encounter.      Current Outpatient Medications   Medication Sig Dispense Refill    polyvinyl alcohol (LIQUIFILM TEARS) 1.4 % ophthalmic solution Place 1 drop into the left eye as needed for Dry Eyes 15 mL 0    ELIQUIS 5 MG TABS tablet TAKE 1 TABLET BY MOUTH TWICE A DAY 60 tablet 5    sotalol (BETAPACE) 80 MG tablet Take 0.5 tablets by mouth 2 times daily 60 tablet 0    tamsulosin (FLOMAX) 0.4 MG capsule Take 2 capsules by mouth daily 30 capsule 0    rosuvastatin (CRESTOR) 20 MG tablet Take 20 mg by mouth nightly      albuterol sulfate  (90 Base) MCG/ACT inhaler Inhale 2 puffs into the lungs every 6 hours as needed for Wheezing 1 Inhaler 5    Multiple Vitamins-Minerals (THERAPEUTIC MULTIVITAMIN-MINERALS) tablet Take 1 tablet by mouth daily         PAST MEDICAL HISTORY  Past Medical History:   Diagnosis Date    Arthritis     CAD (coronary artery disease)     Hyperlipidemia     Hypertension     S/P ablation of atrial fibrillation 01/21/2021    Atrial fibrillation and atrial flutter ablation by Dr. Marielle Lu. SURGICAL HISTORY  Past Surgical History:   Procedure Laterality Date    COLONOSCOPY  2/4/13    Diverticulosis    CYST REMOVAL      baker cyst on right knee    HERNIA REPAIR      PTCA         FAMILY HISTORY  History reviewed. No pertinent family history. SOCIAL HISTORY   reports that he has never smoked. He has never used smokeless tobacco. He reports current alcohol use. He reports that he does not use drugs. PRIMARY CARE PRACTIONER  Leeann Staton MD      OBJECTIVE:   VITALS: BP (!) 147/89   Pulse 78   Temp 98.1 °F (36.7 °C) (Oral)   Resp 16   Ht 5' 10\" (1.778 m)   Wt 170 lb (77.1 kg)   SpO2 97%   BMI 24.39 kg/m²   CONSTITUTION: 80y.o. year old  male, well developed, well nourished, alert, no distress, non-toxic, age- appropriate behavior  EYES: Lids, conjunctiva normal, PERRLA, iri normal EYE:  Ptosis and proptosis are absent. Eyelid edema, erythema, and warmth are absent. Raccoon Eyes are absent. Conjunctivas with L full hemorrhage, with chemosis. scleral icterus, hypopyon, and hyphema are absent . Extraocular muscles are intact. Pupils equal round and reactive to light.  Corrected:            L  20/25            R 20/25    HENT: Head atraumatic, TMs, canals intact, hearing grossly intact, nasal exam normal, oropharynx normal  NECK:  Full active/passive ROM w/o pain, supple, symmetric, no masses  RESP: Respiratory effort normal, clear to auscultation bilaterally, chest expansion and symmetry normal  CV: RRR  ABDOMEN:nondistended,   MUSCULOSKELETAL: No edema/ swelling noted   DERM: inspection normal, no lesions noted on lower extremity  NEURO: NVI intact. Moves all extremities spontaneous. Normal gait. PSYCH: Judgment/ insight normal, Oriented x3, memory normal, no hallucinations, no suicidal/homicidal ideations            ED COURSE & MEDICAL DECISION MAKING         Patient presents with what appears to be spontaneous subconjunctival hemorrhage. Denies any known trauma and just woke up with this today. Does have some chemosis with this. Pupils are equal and reactive bilaterally. Does have normal extraocular motions in all visual planes. Concern for trauma or ruptured globe at this time. No concern for acute glaucoma. D/w , patient's ophthalmologist, ok with plan of discharge at this time. IOP not a concern. States that this should resolve on its own in approximately 10 days. Advised prompt reevaluation for vision changes. States that liquid tears or other lubricant may help assist with patient's blurry vision at this time. Overall reassured by presentation and exam.  All plan of care discussed with patient and advised to either return here or make an appointment with Dr. Ingrid Epps should his symptoms worsen in any way. Questions addressed and stable at discharge. Vital signs and nursing notes reviewed during ED course. The patient and/or the family were informed of the results of any tests/labs/imaging, the treatment plan, and time was allotted to answer questions. ASSESMENT:   1. Subconjunctival hemorrhage of left eye    2.  Chemosis of conjunctiva, left       DDx: subconjunctival hemorrhage, doubt ruptured globe, glaucoma           DISPOSITION: home    Claribel Hawkins, DO Monroyroxie HawkinsDO  01/12/22 0044

## 2022-02-14 ENCOUNTER — HOSPITAL ENCOUNTER (OUTPATIENT)
Age: 82
Discharge: HOME | End: 2022-02-14
Payer: MEDICARE

## 2022-02-14 DIAGNOSIS — J30.2 SEASONAL ALLERGIES: ICD-10-CM

## 2022-02-14 PROCEDURE — 36415 COLL VENOUS BLD VENIPUNCTURE: CPT

## 2022-02-14 PROCEDURE — 82785 ASSAY OF IGE: CPT

## 2022-02-18 LAB — IMMUNOGLOBULIN E: 62 KU/L

## 2022-04-29 ENCOUNTER — HOSPITAL ENCOUNTER (OUTPATIENT)
Age: 82
Discharge: HOME | End: 2022-04-29
Payer: MEDICARE

## 2022-04-29 PROCEDURE — 84153 ASSAY OF PSA TOTAL: CPT

## 2022-04-29 PROCEDURE — 84154 ASSAY OF PSA FREE: CPT

## 2022-04-29 PROCEDURE — 36415 COLL VENOUS BLD VENIPUNCTURE: CPT

## 2022-05-02 LAB
PROSTATE SPECIFIC ANTIGEN FREE: 0.3 NG/ML
PROSTATE SPECIFIC ANTIGEN PERCENT FREE: 33 %
PROSTATE SPECIFIC ANTIGEN: 0.9 NG/ML (ref 0–4)

## 2022-05-19 ENCOUNTER — OFFICE VISIT (OUTPATIENT)
Dept: NEUROLOGY | Age: 82
End: 2022-05-19
Payer: MEDICARE

## 2022-05-19 VITALS
HEART RATE: 59 BPM | OXYGEN SATURATION: 97 % | DIASTOLIC BLOOD PRESSURE: 80 MMHG | HEIGHT: 70 IN | BODY MASS INDEX: 24.34 KG/M2 | WEIGHT: 170 LBS | SYSTOLIC BLOOD PRESSURE: 150 MMHG

## 2022-05-19 DIAGNOSIS — E87.8 DISEQUILIBRIUM SYNDROME: ICD-10-CM

## 2022-05-19 DIAGNOSIS — G60.9 IDIOPATHIC PERIPHERAL NEUROPATHY: Primary | ICD-10-CM

## 2022-05-19 LAB
A/G RATIO: 1.4 (ref 1.1–2.2)
ALBUMIN SERPL-MCNC: 4.2 G/DL (ref 3.4–5)
ALP BLD-CCNC: 80 U/L (ref 40–129)
ALT SERPL-CCNC: 30 U/L (ref 10–40)
ANION GAP SERPL CALCULATED.3IONS-SCNC: 19 MMOL/L (ref 3–16)
AST SERPL-CCNC: 39 U/L (ref 15–37)
BILIRUB SERPL-MCNC: 0.6 MG/DL (ref 0–1)
BUN BLDV-MCNC: 27 MG/DL (ref 7–20)
CALCIUM SERPL-MCNC: 8.9 MG/DL (ref 8.3–10.6)
CHLORIDE BLD-SCNC: 107 MMOL/L (ref 99–110)
CO2: 16 MMOL/L (ref 21–32)
CREAT SERPL-MCNC: 1 MG/DL (ref 0.8–1.3)
FOLATE: >20 NG/ML (ref 4.78–24.2)
GFR AFRICAN AMERICAN: >60
GFR NON-AFRICAN AMERICAN: >60
GLUCOSE BLD-MCNC: 68 MG/DL (ref 70–99)
POTASSIUM SERPL-SCNC: 5.6 MMOL/L (ref 3.5–5.1)
SODIUM BLD-SCNC: 142 MMOL/L (ref 136–145)
TOTAL PROTEIN: 7.1 G/DL (ref 6.4–8.2)
TSH SERPL DL<=0.05 MIU/L-ACNC: 1.72 UIU/ML (ref 0.27–4.2)
VITAMIN B-12: 470 PG/ML (ref 211–911)
VITAMIN D 25-HYDROXY: 46.7 NG/ML

## 2022-05-19 PROCEDURE — G8427 DOCREV CUR MEDS BY ELIG CLIN: HCPCS | Performed by: PSYCHIATRY & NEUROLOGY

## 2022-05-19 PROCEDURE — 1036F TOBACCO NON-USER: CPT | Performed by: PSYCHIATRY & NEUROLOGY

## 2022-05-19 PROCEDURE — 36415 COLL VENOUS BLD VENIPUNCTURE: CPT | Performed by: PSYCHIATRY & NEUROLOGY

## 2022-05-19 PROCEDURE — 1123F ACP DISCUSS/DSCN MKR DOCD: CPT | Performed by: PSYCHIATRY & NEUROLOGY

## 2022-05-19 PROCEDURE — 99215 OFFICE O/P EST HI 40 MIN: CPT | Performed by: PSYCHIATRY & NEUROLOGY

## 2022-05-19 PROCEDURE — G8420 CALC BMI NORM PARAMETERS: HCPCS | Performed by: PSYCHIATRY & NEUROLOGY

## 2022-05-19 RX ORDER — SCOLOPAMINE TRANSDERMAL SYSTEM 1 MG/1
1 PATCH, EXTENDED RELEASE TRANSDERMAL
Qty: 10 PATCH | Refills: 11 | Status: SHIPPED | OUTPATIENT
Start: 2022-05-19 | End: 2022-07-20 | Stop reason: CLARIF

## 2022-05-19 NOTE — PROGRESS NOTES
5/19/22    Alexis Kramer  1940    Chief Complaint   Patient presents with    New Patient     Headaches and dizziness x2/3 years after passing out and falling and hittind head. Feels very off balance since. History of Present Illness  Antionette Jo is a 80 y.o. male presenting today for evaluation of:  Antionette Jo \"Osito\" Elan Santo [facility]. He was seen once last year for similar complaints. 2 years ago he had an incident where he passed out and was found to have an irregular heart rhythm. He underwent ablation at that time and his heart rhythm improved. He has been following with cardiology since 2007 has had stents placed. He has been on Eliquis since that time due to a history of atrial fibrillation. He tells me since the incident he has had difficulty with his balance. He tells me that it \"feels like it is his eyes\". When he looks far right or far left he has double vision. He feels like there is pressure behind his eyes and to like he always has a \"low-grade headache\". He states that over the past 30 days he has had 29 to 30 days with a headache. It is about a 6 out of 10 in intensity. He has some sensitivity to light but not much sensitivity to sounds. There is no nausea or vomiting. He does not take any medications for the head pain. He has a history of obstructive sleep apnea and uses CPAP. He is compliant and states that he \"sleeps good\". He does have history of hearing loss and tinnitus. He did endorse improvement with vestibular therapy in the past.    He denies any additional syncopal spells. He he denies any falls. He does not have any numbness or tingling in his legs or feet. There is no history of diabetes. He has never had gastric bypass surgery. He is not an alcoholic or heavy drinker. He has never had cancer or chemotherapy. He has a history of coronary artery disease and has underwent stent placement.   He has atrial fibrillation and is on anticoagulation. Subjective    Review of Symptoms:  Neurologic   Symptoms: double vision, dizziness, loss of hearing, difficulty with gait or walking, no bowel symptoms, no vertigo, no confusion, no memory loss, no speech disorder, no visual loss, no sensory disturbances, no weakness, no headaches, no bladder symptoms, no seizures, no excessive fatigue and no syncope    Current Outpatient Medications   Medication Sig Dispense Refill    scopolamine (TRANSDERM-SCOP, 1.5 MG,) transdermal patch Place 1 patch onto the skin every 72 hours 10 patch 11    montelukast (SINGULAIR) 10 MG tablet TAKE 1 TABLET BY MOUTH EVERY DAY 30 tablet 3    AMLODIPINE BENZOATE PO Take by mouth      ELIQUIS 5 MG TABS tablet TAKE 1 TABLET BY MOUTH TWICE A DAY 60 tablet 5    sotalol (BETAPACE) 80 MG tablet Take 0.5 tablets by mouth 2 times daily 60 tablet 0    rosuvastatin (CRESTOR) 20 MG tablet Take 20 mg by mouth nightly      albuterol sulfate  (90 Base) MCG/ACT inhaler Inhale 2 puffs into the lungs every 6 hours as needed for Wheezing 1 Inhaler 5     No current facility-administered medications for this visit. Past Medical History:   Diagnosis Date    Arthritis     CAD (coronary artery disease)     Hyperlipidemia     Hypertension     S/P ablation of atrial fibrillation 01/21/2021    Atrial fibrillation and atrial flutter ablation by Dr. Kendal Whalen.        Past Surgical History:   Procedure Laterality Date    COLONOSCOPY  2/4/13    Diverticulosis    CYST REMOVAL      baker cyst on right knee    HERNIA REPAIR      PTCA          Social History     Socioeconomic History    Marital status:      Spouse name: Not on file    Number of children: Not on file    Years of education: Not on file    Highest education level: Not on file   Occupational History    Not on file   Tobacco Use    Smoking status: Never Smoker    Smokeless tobacco: Never Used   Vaping Use    Vaping Use: Not on file   Substance and Sexual Activity    Alcohol use: Yes     Comment: occas    Drug use: No     Comment: 2 cups of coffee daily     Sexual activity: Not Currently     Partners: Female   Other Topics Concern    Not on file   Social History Narrative    Not on file     Social Determinants of Health     Financial Resource Strain:     Difficulty of Paying Living Expenses: Not on file   Food Insecurity:     Worried About Running Out of Food in the Last Year: Not on file    Sruthi of Food in the Last Year: Not on file   Transportation Needs:     Lack of Transportation (Medical): Not on file    Lack of Transportation (Non-Medical): Not on file   Physical Activity:     Days of Exercise per Week: Not on file    Minutes of Exercise per Session: Not on file   Stress:     Feeling of Stress : Not on file   Social Connections:     Frequency of Communication with Friends and Family: Not on file    Frequency of Social Gatherings with Friends and Family: Not on file    Attends Sikhism Services: Not on file    Active Member of [address] [place] [address] or Organizations: Not on file    Attends Club or Organization Meetings: Not on file    Marital Status: Not on file   Intimate Partner Violence:     Fear of Current or Ex-Partner: Not on file    Emotionally Abused: Not on file    Physically Abused: Not on file    Sexually Abused: Not on file   Housing Stability:     Unable to Pay for Housing in the Last Year: Not on file    Number of [place] in the Last Year: Not on file    Unstable Housing in the Last Year: Not on file       No family history on file.     Objective    Physical Exam:    Constitutional   Weight: well nourished  Heart/Vascular   Rate and Rhythm: RRR   Murmurs: none   Arterial Pulses:  no carotid bruits  Neck   Appearance/Palpation/Auscultation: supple  Mental Status   Orientation: oriented to person, oriented to place, oriented to problem and oriented to time   Mood/Affect: appropriate mood and appropriate affect   Memory/Other: recent memory intact, remote memory intact, fund of knowledge intact, attention span normal and concentration normal  Language   Language: (normal) language, no dysarthria, (normal) articulation and no dysphasia/aphasia  Cranial Nerves   CN II Right: visual fields appear intact   CN II Left: visual fields appear intact   CN III, IV, VI: EOM no nystagmus, normal pursuit and extraocular muscle strength normal   CN III: pupil normal size, pupil reactive to light and dark, pupil accomodates and no ptosis   CN IV: normal   CN VI: normal   CN V Right: normal sensation and muscles of mastication intact   CN V Left: normal sensation and muscles of mastication intact   CN VII Right: normal facial expression   CN VII Left: normal facial expression   CN VIII Right: hearing in tact to normal conversation   CN VIII Left: hearing in tact to normal conversation   CN IX,X: normal palatal movement   CN XI Right: normal sternocleidomastoid and normal trapezius   CN XI Left: normal sternocleidomastoid and normal trapezius   CN XII: no tremors of the tongue, no fasciculation of the tongue, tongue protrudes midline, normal power to left and normal power to right  Gait and Stance   Gait/Posture: station normal, ambulates independently, Romberg's test normal, wide based stance and wide-based gait  Motor/Coordination Exam   General: no bradykinesia, no tremors, no chorea, no athetosis, no myoclonus and no dyskinesia   Right Upper Extremity: normal motor strength, normal bulk and normal tone   Left Upper Extremity: normal motor strength, normal bulk and normal tone   Right Lower Extremity: normal motor strength, normal bulk and normal tone   Left Lower Extremity: normal motor strength, normal bulk and normal tone   Coordination: no drift, normal finger-to-nose and rapid alternating movements normal  Reflexes   Reflexes Right: biceps 2/4, triceps 2/4, brachioradialis 2/4, patellar 1/4 and achilles 0/4   Reflexes Left: biceps 2/4, triceps 2/4, brachioradialis 2/4, patellar 1/4 and achilles 0/4   Plantar Reflex Right: response downgoing   Plantar Reflex Left: response downgoing   Hoffmans Reflex Right: absent   Hoffmans Reflex Left: absent  Sensory   Sensation: normal light touch, normal pinprick, normal vibration, no neglect and decreased vibration lowers Severe with vibration loss up to the knees bilaterally  Spine   Cervical Spine: no tenderness, no dystonia  and full ROM   Thoracic Spine: no spasms, no bony abnormalities, normal curvature, no tenderness and full ROM   Low Back: full ROM, no pain, no spasms and no bony abnormalities  Lungs   Auscultation: normal breath sounds  Skin   Inspection: no jaundice, no lesions, no rashes and no cyanosis      BP (!) 150/80 (Site: Right Upper Arm, Position: Sitting, Cuff Size: Medium Adult)   Pulse 59   Ht 5' 10\" (1.778 m)   Wt 170 lb (77.1 kg)   SpO2 97%   BMI 24.39 kg/m²     Assessment and Plan     Diagnosis Orders   1. Idiopathic peripheral neuropathy  Vitamin B12 & Folate    TSH    Electrophoresis Protein, Serum    Hemoglobin A1C    Comprehensive Metabolic Panel    Vitamin D 25 Hydroxy    Nerve Conduction Test with EMG   2. Disequilibrium syndrome  PT vestibular rehab    scopolamine (TRANSDERM-SCOP, 1.5 MG,) transdermal patch       Dago Schulte continues to describe a disequilibrium. Interestingly, this sensation not only occurs with ambulation but even at rest.  He seems to feel it is a \"discoordination of his eyes and limbs\". I suspect this is multifactorial.  He does suffer from tinnitus and hearing loss and there is likely some degree of peripheral vertigo. He has had improvement with vestibular therapy in the past.  I will set him up for a consultation with vestibular therapy to see if reestablishing for sessions and training will be of benefit. We will trial a scopolamine patch to be applied every 72 hours as well.     His exam does also reveal a fairly significant vibratory sense decreased in his distal lower extremities. He is not diabetic and history does not reveal any other significant factors which could contribute to neuropathy. I will check several labs for potential reversible causes of neuropathy and obtain an EMG to evaluate this in better detail. Although this would not explain his baseline nonambulatory disequilibrium feeling, this certainly contributes to the exaggeration he feels upon ambulation. If these measures do not provide improvement I would concentrate on headache prevention given his chronic cephalgia. Is uncertain if he did strike his head after his fall 2 years ago. It could be more of a postconcussive headache with dizziness. I would not use a TCA due to his age and anticholinergic side effects. We could trial some low-dose gabapentin 100 mg 2-3 times daily. Again, he would like to do the above work-up and see if vestibular therapy is helpful before pursuing pharmacologic measures. Return in about 3 months (around 8/19/2022) for Follow-up PA/NP.     Manan Connors, DO

## 2022-05-20 LAB
ALBUMIN SERPL-MCNC: 3.5 G/DL (ref 3.1–4.9)
ALPHA-1-GLOBULIN: 0.3 G/DL (ref 0.2–0.4)
ALPHA-2-GLOBULIN: 0.8 G/DL (ref 0.4–1.1)
BETA GLOBULIN: 1.1 G/DL (ref 0.9–1.6)
ESTIMATED AVERAGE GLUCOSE: 105.4 MG/DL
GAMMA GLOBULIN: 1.5 G/DL (ref 0.6–1.8)
HBA1C MFR BLD: 5.3 %
SPE/IFE INTERPRETATION: NORMAL

## 2022-05-27 ENCOUNTER — APPOINTMENT (OUTPATIENT)
Dept: CT IMAGING | Age: 82
End: 2022-05-27
Payer: MEDICARE

## 2022-05-27 ENCOUNTER — HOSPITAL ENCOUNTER (EMERGENCY)
Age: 82
Discharge: HOME | End: 2022-05-27
Attending: EMERGENCY MEDICINE
Payer: MEDICARE

## 2022-05-27 VITALS
TEMPERATURE: 97.7 F | OXYGEN SATURATION: 98 % | RESPIRATION RATE: 19 BRPM | BODY MASS INDEX: 24.34 KG/M2 | HEIGHT: 70 IN | DIASTOLIC BLOOD PRESSURE: 78 MMHG | HEART RATE: 78 BPM | WEIGHT: 170 LBS | SYSTOLIC BLOOD PRESSURE: 140 MMHG

## 2022-05-27 DIAGNOSIS — M89.9 LESION OF PELVIC BONE: ICD-10-CM

## 2022-05-27 DIAGNOSIS — D17.1 LIPOMA OF ABDOMINAL WALL: Primary | ICD-10-CM

## 2022-05-27 LAB
ALBUMIN SERPL-MCNC: 4.4 GM/DL (ref 3.4–5)
ALP BLD-CCNC: 79 IU/L (ref 40–129)
ALT SERPL-CCNC: 25 U/L (ref 10–40)
ANION GAP SERPL CALCULATED.3IONS-SCNC: 8 MMOL/L (ref 4–16)
AST SERPL-CCNC: 27 IU/L (ref 15–37)
BACTERIA: NEGATIVE /HPF
BASOPHILS ABSOLUTE: 0 K/CU MM
BASOPHILS RELATIVE PERCENT: 0.3 % (ref 0–1)
BILIRUB SERPL-MCNC: 0.5 MG/DL (ref 0–1)
BILIRUBIN URINE: NEGATIVE MG/DL
BLOOD, URINE: NEGATIVE
BUN BLDV-MCNC: 17 MG/DL (ref 6–23)
CALCIUM SERPL-MCNC: 9.2 MG/DL (ref 8.3–10.6)
CHLORIDE BLD-SCNC: 105 MMOL/L (ref 99–110)
CLARITY: CLEAR
CO2: 26 MMOL/L (ref 21–32)
COLOR: YELLOW
CREAT SERPL-MCNC: 1 MG/DL (ref 0.9–1.3)
DIFFERENTIAL TYPE: ABNORMAL
EOSINOPHILS ABSOLUTE: 0.3 K/CU MM
EOSINOPHILS RELATIVE PERCENT: 4.7 % (ref 0–3)
GFR AFRICAN AMERICAN: >60 ML/MIN/1.73M2
GFR NON-AFRICAN AMERICAN: >60 ML/MIN/1.73M2
GLUCOSE BLD-MCNC: 103 MG/DL (ref 70–99)
GLUCOSE, URINE: NEGATIVE MG/DL
HCT VFR BLD CALC: 48.7 % (ref 42–52)
HEMOGLOBIN: 15.5 GM/DL (ref 13.5–18)
IMMATURE NEUTROPHIL %: 0.2 % (ref 0–0.43)
KETONES, URINE: NEGATIVE MG/DL
LEUKOCYTE ESTERASE, URINE: NEGATIVE
LIPASE: 33 IU/L (ref 13–60)
LYMPHOCYTES ABSOLUTE: 1.1 K/CU MM
LYMPHOCYTES RELATIVE PERCENT: 17.6 % (ref 24–44)
MCH RBC QN AUTO: 29.9 PG (ref 27–31)
MCHC RBC AUTO-ENTMCNC: 31.8 % (ref 32–36)
MCV RBC AUTO: 93.8 FL (ref 78–100)
MONOCYTES ABSOLUTE: 0.6 K/CU MM
MONOCYTES RELATIVE PERCENT: 9.6 % (ref 0–4)
MUCUS: ABNORMAL HPF
NITRITE URINE, QUANTITATIVE: NEGATIVE
NUCLEATED RBC %: 0 %
PDW BLD-RTO: 13.4 % (ref 11.7–14.9)
PH, URINE: 7 (ref 5–8)
PLATELET # BLD: 166 K/CU MM (ref 140–440)
PMV BLD AUTO: 11 FL (ref 7.5–11.1)
POTASSIUM SERPL-SCNC: 4.1 MMOL/L (ref 3.5–5.1)
PROTEIN UA: NEGATIVE MG/DL
RBC # BLD: 5.19 M/CU MM (ref 4.6–6.2)
RBC URINE: <1 /HPF (ref 0–3)
SEGMENTED NEUTROPHILS ABSOLUTE COUNT: 4 K/CU MM
SEGMENTED NEUTROPHILS RELATIVE PERCENT: 67.6 % (ref 36–66)
SODIUM BLD-SCNC: 139 MMOL/L (ref 135–145)
SPECIFIC GRAVITY UA: 1.01 (ref 1–1.03)
TOTAL IMMATURE NEUTOROPHIL: 0.01 K/CU MM
TOTAL NUCLEATED RBC: 0 K/CU MM
TOTAL PROTEIN: 7.9 GM/DL (ref 6.4–8.2)
TRICHOMONAS: ABNORMAL /HPF
UROBILINOGEN, URINE: 0.2 MG/DL (ref 0.2–1)
WBC # BLD: 6 K/CU MM (ref 4–10.5)
WBC UA: <1 /HPF (ref 0–2)

## 2022-05-27 PROCEDURE — 2580000003 HC RX 258: Performed by: EMERGENCY MEDICINE

## 2022-05-27 PROCEDURE — 80053 COMPREHEN METABOLIC PANEL: CPT

## 2022-05-27 PROCEDURE — 6360000004 HC RX CONTRAST MEDICATION: Performed by: EMERGENCY MEDICINE

## 2022-05-27 PROCEDURE — 83690 ASSAY OF LIPASE: CPT

## 2022-05-27 PROCEDURE — 74177 CT ABD & PELVIS W/CONTRAST: CPT

## 2022-05-27 PROCEDURE — 85025 COMPLETE CBC W/AUTO DIFF WBC: CPT

## 2022-05-27 PROCEDURE — 99285 EMERGENCY DEPT VISIT HI MDM: CPT

## 2022-05-27 PROCEDURE — 81001 URINALYSIS AUTO W/SCOPE: CPT

## 2022-05-27 RX ORDER — SODIUM CHLORIDE 0.9 % (FLUSH) 0.9 %
10 SYRINGE (ML) INJECTION PRN
Status: DISCONTINUED | OUTPATIENT
Start: 2022-05-27 | End: 2022-05-27 | Stop reason: HOSPADM

## 2022-05-27 RX ADMIN — IOPAMIDOL 80 ML: 755 INJECTION, SOLUTION INTRAVENOUS at 14:51

## 2022-05-27 RX ADMIN — SODIUM CHLORIDE, PRESERVATIVE FREE 10 ML: 5 INJECTION INTRAVENOUS at 14:51

## 2022-05-27 ASSESSMENT — PAIN - FUNCTIONAL ASSESSMENT: PAIN_FUNCTIONAL_ASSESSMENT: NONE - DENIES PAIN

## 2022-05-27 NOTE — ED PROVIDER NOTES
Emergency Department Encounter    Patient: Cassia Dumont  MRN: [id number]  : 1940  Date of Evaluation: 2022  ED Provider:  Alvaro Cantor DO    Triage Chief Complaint:   Other (patient states he has a bump that is sticking out of his right side, no known injury and no pain)    [place]:  Cassia Dumont is a 80 y.o. male that presents to the emergency department stating he has a lump on the right side of his abdomen. Patient states been going on for couple months. Patient states he initially went for started seeing his primary care physician who was unable to visualize a lump at that time. He states it is gotten larger. Patient states he has not been back to see his primary care physician. Patient states he was concerned it may be a cancer so he came into the emergency room for evaluation. Patient states no pus drainage discharge not red hot or warm not tender to palpation or painful. He states is just there. He denies any history of strokes or cancers. He states he does have high blood pressure high cholesterol. Patient denies any drugs alcohol tobacco.  Patient states no chest pain shortness breath nausea vomiting diarrhea abdominal pain fever chills cough dizziness lightheaded syncopal episode no dysuria hematuria. He states he has had an ablation for A. fib does have heart disease 2 stents. Denies any falls injuries or trauma. No difficulty eating drinking swallowing normal bowel movements. Patient here for evaluation.     ROS - see HPI, below listed is current ROS at time of my eval:  General:  No fevers, no chills, no weakness  Eyes:  No recent vison changes, no discharge  ENT:  No sore throat, no nasal congestion, no hearing changes  Cardiovascular:  No chest pain, no palpitations  Respiratory:  No shortness of breath, no cough, no wheezing  Gastrointestinal: Right side abdominal mass, no pain, no nausea, no vomiting, no diarrhea  Musculoskeletal:  No muscle pain, no joint pain  Skin:  No rash, no pruritis, no easy bruising  Neurologic:  No speech problems, no headache, no extremity numbness, no extremity tingling, no extremity weakness  Psychiatric:  No anxiety  Genitourinary:  No dysuria, no hematuria  Endocrine:  No unexpected weight gain, no unexpected weight loss  Extremities:  no edema, no pain    Past Medical History:   Diagnosis Date    Arthritis     CAD (coronary artery disease)     Hyperlipidemia     Hypertension     S/P ablation of atrial fibrillation 01/21/2021    Atrial fibrillation and atrial flutter ablation by Dr. Fatuma Cortez. Past Surgical History:   Procedure Laterality Date    COLONOSCOPY  2/4/13    Diverticulosis    CYST REMOVAL      baker cyst on right knee    HERNIA REPAIR      PTCA       History reviewed. No pertinent family history. Social History     Socioeconomic History    Marital status:      Spouse name: Not on file    Number of children: Not on file    Years of education: Not on file    Highest education level: Not on file   Occupational History    Not on file   Tobacco Use    Smoking status: Never Smoker    Smokeless tobacco: Never Used   Vaping Use    Vaping Use: Not on file   Substance and Sexual Activity    Alcohol use: Yes     Comment: occas    Drug use: No     Comment: 2 cups of coffee daily     Sexual activity: Not Currently     Partners: Female   Other Topics Concern    Not on file   Social History Narrative    Not on file     Social Determinants of Health     Financial Resource Strain:     Difficulty of Paying Living Expenses: Not on file   Food Insecurity:     Worried About Running Out of Food in the Last Year: Not on file    Sruthi of Food in the Last Year: Not on file   Transportation Needs:     Lack of Transportation (Medical): Not on file    Lack of Transportation (Non-Medical):  Not on file   Physical Activity:     Days of Exercise per Week: Not on file    Minutes of Exercise per Session: Not on file Stress:     Feeling of Stress : Not on file   Social Connections:     Frequency of Communication with Friends and Family: Not on file    Frequency of Social Gatherings with Friends and Family: Not on file    Attends Gnosticist Services: Not on file    Active Member of Clubs or Organizations: Not on file    Attends Club or Organization Meetings: Not on file    Marital Status: Not on file   Intimate Partner Violence:     Fear of Current or Ex-Partner: Not on file    Emotionally Abused: Not on file    Physically Abused: Not on file    Sexually Abused: Not on file   Housing Stability:     Unable to Pay for Housing in the Last Year: Not on file    Number of [place] in the Last Year: Not on file    Unstable Housing in the Last Year: Not on file     No current facility-administered medications for this encounter. Current Outpatient Medications   Medication Sig Dispense Refill    scopolamine (TRANSDERM-SCOP, 1.5 MG,) transdermal patch Place 1 patch onto the skin every 72 hours 10 patch 11    montelukast (SINGULAIR) 10 MG tablet TAKE 1 TABLET BY MOUTH EVERY DAY 30 tablet 3    AMLODIPINE BENZOATE PO Take by mouth      ELIQUIS 5 MG TABS tablet TAKE 1 TABLET BY MOUTH TWICE A DAY 60 tablet 5    sotalol (BETAPACE) 80 MG tablet Take 0.5 tablets by mouth 2 times daily 60 tablet 0    rosuvastatin (CRESTOR) 20 MG tablet Take 20 mg by mouth nightly      albuterol sulfate  (90 Base) MCG/ACT inhaler Inhale 2 puffs into the lungs every 6 hours as needed for Wheezing 1 Inhaler 5     No Known Allergies    Nursing Notes Reviewed    Physical Exam:  Triage VS:    ED Triage Vitals [05/27/22 1059]   Enc Vitals Group      BP (!) 149/83      Heart Rate 65      Resp 20      Temp 97.7 °F (36.5 °C)      Temp Source Oral      SpO2 97 %      Weight 170 lb (77.1 kg)      Height 5' 10\" (1.778 m)      Head Circumference       Peak Flow       Pain Score       Pain Loc       Pain Edu? Excl. in [address]?       BP (!) 140/78   Pulse 78   Temp 97.7 °F (36.5 °C) (Oral)   Resp 19   Ht 5' 10\" (1.778 m)   Wt 170 lb (77.1 kg)   SpO2 98%   BMI 24.39 kg/m²     My pulse ox interpretation is - normal    General appearance:  No acute distress. Skin:  Warm. Dry. Eye:  Extraocular movements intact. Ears, nose, mouth and throat:  Oral mucosa moist   Neck:  Trachea midline. Extremity:  No swelling. Normal ROM     Heart:  Regular rate and rhythm, normal S1 & S2, no extra heart sounds. Perfusion:  intact  Respiratory:  Lungs clear to auscultation bilaterally. Respirations nonlabored. Abdominal: Positive for right upper lateral abdominal bulge, mass versus fatty tumor, no redness pus drainage discharge or signs of cellulitis, normal bowel sounds. Soft. Nontender. Non distended. Back:  No CVA tenderness to palpation     Neurological:  Alert and oriented times 3. No focal neuro deficits.              Psychiatric:  Appropriate    I have reviewed and interpreted all of the currently available lab results from this visit (if applicable):  Results for orders placed or performed during the hospital encounter of 05/27/22   CBC with Auto Differential   Result Value Ref Range    WBC 6.0 4.0 - 10.5 K/CU MM    RBC 5.19 4.6 - 6.2 M/CU MM    Hemoglobin 15.5 13.5 - 18.0 GM/DL    Hematocrit 48.7 42 - 52 %    MCV 93.8 78 - 100 FL    MCH 29.9 27 - 31 PG    MCHC 31.8 (L) 32.0 - 36.0 %    RDW 13.4 11.7 - 14.9 %    Platelets 616 454 - 036 K/CU MM    MPV 11.0 7.5 - 11.1 FL    Differential Type AUTOMATED DIFFERENTIAL     Segs Relative 67.6 (H) 36 - 66 %    Lymphocytes % 17.6 (L) 24 - 44 %    Monocytes % 9.6 (H) 0 - 4 %    Eosinophils % 4.7 (H) 0 - 3 %    Basophils % 0.3 0 - 1 %    Segs Absolute 4.0 K/CU MM    Lymphocytes Absolute 1.1 K/CU MM    Monocytes Absolute 0.6 K/CU MM    Eosinophils Absolute 0.3 K/CU MM    Basophils Absolute 0.0 K/CU MM    Nucleated RBC % 0.0 %    Total Nucleated RBC 0.0 K/CU MM    Total Immature Neutrophil 0.01 K/CU MM    Immature Neutrophil % 0.2 0 - 0.43 %   Comprehensive Metabolic Panel w/ Reflex to MG   Result Value Ref Range    Sodium 139 135 - 145 MMOL/L    Potassium 4.1 3.5 - 5.1 MMOL/L    Chloride 105 99 - 110 mMol/L    CO2 26 21 - 32 MMOL/L    BUN 17 6 - 23 MG/DL    CREATININE 1.0 0.9 - 1.3 MG/DL    Glucose 103 (H) 70 - 99 MG/DL    Calcium 9.2 8.3 - 10.6 MG/DL    Albumin 4.4 3.4 - 5.0 GM/DL    Total Protein 7.9 6.4 - 8.2 GM/DL    Total Bilirubin 0.5 0.0 - 1.0 MG/DL    ALT 25 10 - 40 U/L    AST 27 15 - 37 IU/L    Alkaline Phosphatase 79 40 - 129 IU/L    GFR Non-African American >60 >60 mL/min/1.73m2    GFR African American >60 >60 mL/min/1.73m2    Anion Gap 8 4 - 16   Lipase   Result Value Ref Range    Lipase 33 13 - 60 IU/L   Urinalysis with Microscopic   Result Value Ref Range    Color, UA YELLOW (A) YELLOW    Clarity, UA CLEAR CLEAR    Glucose, Urine NEGATIVE NEGATIVE MG/DL    Bilirubin Urine NEGATIVE NEGATIVE MG/DL    Ketones, Urine NEGATIVE NEGATIVE MG/DL    Specific Gravity, UA 1.015 1.001 - 1.035    Blood, Urine NEGATIVE NEGATIVE    pH, Urine 7.0 5.0 - 8.0    Protein, UA NEGATIVE NEGATIVE MG/DL    Urobilinogen, Urine 0.2 0.2 - 1.0 MG/DL    Nitrite Urine, Quantitative NEGATIVE NEGATIVE    Leukocyte Esterase, Urine NEGATIVE NEGATIVE    RBC, UA <1 0 - 3 /HPF    WBC, UA <1 0 - 2 /HPF    Bacteria, UA NEGATIVE NEGATIVE /HPF    Mucus, UA RARE (A) NEGATIVE HPF    Trichomonas, UA NONE SEEN NONE SEEN /HPF      Radiographs (if obtained):  Radiologist's Report Reviewed:  CT ABDOMEN PELVIS W IV CONTRAST Additional Contrast? None   Final Result   1. The body wall demonstrates no intramuscular lipoma or asymmetric   subcutaneous soft tissue thickening. A lipoma may account for the lateral   palpable area of interest though no marker was placed on the skin to account   for the area of interest and without asymmetric soft tissue thickening the   lipoma may blend with the rest of the subcutaneous tissues.   No finding   concerning for sarcoma or malignant body wall mass. 2. No acute osseous abnormality. There are multifocal bilateral pelvic   intramedullary sclerotic foci. This could represent possible blastic   metastasis in the setting of prostate cancer versus bone islands. There are   no prior exams available for comparison. 3. No acute intrapelvic process. EKG (if obtained): (All EKG's are interpreted by myself in the absence of a cardiologist)      MDM:  Patient presents emergency department with bulging to the right side of his abdomen that is gotten larger over the last couple months. On physical exam patient does have a slight bulge on the right side of his lateral abdomen when compared to the left. Nontender palpation no pus drainage discharge possibly fatty tumor fatty lipoma not likely to be a hernia but it is in the differential.  Patient ordered labs and CT abdomen pelvis. I discussed with patient he will need to follow-up with his primary care physician get referred to a general surgeon versus dermatology for any, biopsies or excisions for final diagnosis. Laboratory studies unremarkable, urinalysis negative for any acute process. CT abdomen pelvis shows no abnormality about the right abdomen no asymmetric soft tissue thickening over abnormality. Patient does have multifocal bilateral pelvic intramedullary sclerotic foci could represent blastic metastasis in the setting of prostate cancer versus bone island. Patient does not have any cancer is likely bone islands. Patient will need to follow-up primary care physician for reevaluation. Patient is return immediately worsening symptoms. All questions answered. Clinical Impression:  1. Lipoma of abdominal wall    2. Lesion of pelvic bone      Disposition referral (if applicable):   Davey Bautista MD  [address] [place] [address]   [place] [phone]    Schedule an appointment as soon as possible for a visit in 4 days  for re-evaluation of lipoma and pelvic lesions. call for an appointment    [facility] Emergency Department  [address] [phone]  [phone]  Go in 1 day  If symptoms worsen    Disposition medications (if applicable):  New Prescriptions    No medications on file     ED Provider Disposition Time  DISPOSITION   3:41 PM        Comment: Please note this report has been produced using speech recognition software and may contain errors related to that system including errors in grammar, punctuation, and spelling, as well as words and phrases that may be inappropriate. Efforts were made to edit the dictations.         Clearnce Olszewski, DO  06/01/22 [id number]

## 2022-06-13 ENCOUNTER — HOSPITAL ENCOUNTER (OUTPATIENT)
Dept: PHYSICAL THERAPY | Age: 82
Setting detail: THERAPIES SERIES
Discharge: HOME | End: 2022-06-13
Payer: MEDICARE

## 2022-06-13 PROCEDURE — 97112 NEUROMUSCULAR REEDUCATION: CPT

## 2022-06-13 PROCEDURE — 97161 PT EVAL LOW COMPLEX 20 MIN: CPT

## 2022-06-13 NOTE — PROGRESS NOTES
Physical Therapy: Initial Evaluation    Patient: Isatu Perrin (12 y.o. male)   Examination Date: 2022      :  1940 ;    Davis Durbin MRN: [id number]  CSN: [id number]   Insurance: Payor: Latoya Hayden / Plan: [facility] [employer] / Product Type: *No Product type* /   Insurance ID: [id number] - (Medicare Managed) Secondary Insurance (if applicable):    Referring Physician: Kamryn Ag DO   PCP: Ladonna Wu MD    Medical Diagnosis: Other disorders of electrolyte and fluid balance, not elsewhere classified [E87.8] Disequilibrium syndrome  Treatment Diagnosis: impaired balance     PERTINENT MEDICAL HISTORY   Patient Assessed for Rehabilitation Services: Yes       Medical History: Chart Reviewed: Yes   Past Medical History:   Diagnosis Date    Arthritis     CAD (coronary artery disease)     Hyperlipidemia     Hypertension     S/P ablation of atrial fibrillation 2021    Atrial fibrillation and atrial flutter ablation by Dr. Sydni Portillo.      Surgical History:   Past Surgical History:   Procedure Laterality Date    COLONOSCOPY  13    Diverticulosis    CYST REMOVAL      baker cyst on right knee    HERNIA REPAIR      PTCA         Medications:   Current Outpatient Medications:     montelukast (SINGULAIR) 10 MG tablet, TAKE 1 TABLET BY MOUTH EVERY DAY, Disp: 90 tablet, Rfl: 1    scopolamine (TRANSDERM-SCOP, 1.5 MG,) transdermal patch, Place 1 patch onto the skin every 72 hours, Disp: 10 patch, Rfl: 11    AMLODIPINE BENZOATE PO, Take by mouth, Disp: , Rfl:     ELIQUIS 5 MG TABS tablet, TAKE 1 TABLET BY MOUTH TWICE A DAY, Disp: 60 tablet, Rfl: 5    sotalol (BETAPACE) 80 MG tablet, Take 0.5 tablets by mouth 2 times daily, Disp: 60 tablet, Rfl: 0    rosuvastatin (CRESTOR) 20 MG tablet, Take 20 mg by mouth nightly, Disp: , Rfl:     albuterol sulfate  (90 Base) MCG/ACT inhaler, Inhale 2 puffs into the lungs every 6 hours as needed for Wheezing, Disp: 1 Inhaler, Rfl: 5  Allergies: Patient has no known allergies. SUBJECTIVE EXAMINATION     History obtained from[de-identified] Patient,Chart Review,           Subjective History:    Pt notes that he had an episode when he got up to the bathroom at night and passed out and fell, was due to Afib a year and a half ago. He reports a burst of light from his eye and now all of his energy is dissipating from his eye since then. He has pain in the eye, but eye MD notes his eyes are okay at baseline. He also notes that his balance has gotten worse as well, has not fallen since the incident. Pt denies any dizziness unless he gets up too fast, which does not happen often. Pt denies any room spinning when rolling R or L in bed or at any time. Pt is unsure if he hit his head when he fell. Ringing in BL ears L>R baseline before the fall and did not change after the fall. He does also have headaches since the fall around his temples.     Additional History:  Additional Pertinent Hx: HTN, COPD, L1 compression fracture, Afib                 Learning/Language: Learning  Does the patient/guardian have any barriers to learning?: No barriers     Pain Screening   Pain Screening  Patient Currently in Pain: No (just pressure in his eyes)    Functional Status         Social History:  Social History  Lives With: Spouse  Home Layout: Two level    Occupation/Interests:  Occupation: Retired    Prior Level of Function:    Independent        Current Level of Function:  MI with increased time and compensation for completion      ADL Assistance:  (MI with increased time and compensation for completion)  Homemaking Assistance:  (MI with increased time and compensation for completion)  Ambulation Assistance:  (MI with increased time and compensation for completion)  Transfer Assistance:  (MI with increased time and compensation for completion)    OBJECTIVE EXAMINATION   Restrictions: none             Review of Systems:  Vision: Within Functional Limits (glasses)  Hearing: Within functional limits  Follows Commands: Within Functional Limits    Gait: WFL       Vision/Vestibular Assessment  Visual/Occulomotor:   Visual/Occulomotor Assessed: Yes  Corrective Lenses:  (Bifocals)  Eye Movement Range: Conjugate  Smooth pursuits horizontal:  (some difficulty w/ L upper quadrant)  Smooth pursuits vertical:  (some difficulty w/ L upper quadrant)  Convergence: Severe Deficit (> 10\" from nose)  Saccades horizontal:  Within Defined Limits  Saccades vertical: Within Defined Limits    VOR Tests:   Horizontal Option: Intact  Vertical Option: Intact  Cancellation Horizontal Option: Impaired  Cancellation Vertical Option: Impaired    Balance Screen:   Balance  Comments: romberg EO foam: min sway. romberg EC foam: max sway. Outcome Measure(s) Completed: Outcome Measure Used: DGI: 18/24    DHI: 46       ASSESSMENT     Impression: Assessment: Pt is a 82-year-old male who presents to therapy with worsening balance and eye movement issues since his fall due to passing out from Afib a year and a half ago. Pt denies any dizziness, including no room spinning, therefore BPPV was not tested this date. Pt does present with mild VOR impairment and impaired balance, most likely due to vestibular hypofunction. Pt would benefit from skilled therapy interventions to address listed impairments, progress toward goal completion and improve ADL/IADL status. PT also warranted to reduce risk for further injury or decline. Body Structures, Functions, Activity Limitations Requiring Skilled Therapeutic Intervention: Vestibular Impairment,Decreased balance    Statement of Medical Necessity: Physical Therapy is both indicated and medically necessary as outlined in the POC to increase the likelihood of meeting the functionally related goals stated below.      Patient's Activity Tolerance:      WFL  Patient's rehabilitation potential/prognosis is considered to be: Good    Factors which may impact rehabilitation potential include: None        GOALS     Patient Goal(s): improve balance and eye movements  Short Term Goals Completed by Refer to LTG Goal Status                                                                   Long Term Goals Completed by 8 visits Goal Status   Pt will improve DGI to 22 or more to show improved balanace and cut off score for safe ambulators New   Pt will improve DHI to 34 or less to show MDC and subjective improvement in condition New   Pt will be IND with HEP in order to maximize recovery outside of clinic New   Pt will report overall improvement in condition by 50% New                                            TREATMENT PLAN       Requires PT Follow-Up: Yes  Treatment Initiated : yes on 6/13  Specific Instructions for Next Treatment: vestibular therapy, balance, VOR    Pt. actively involved in establishing Plan of Care and Goals: Yes  Patient/ Caregiver education and instruction: Plan of Care,PT Role,Goals,Evaluative findings,Home Exercise Program,Disease Specific Education,Anatomy of condition             Treatment may include any combination of the following: Strengthening,ROM,Balance training,Functional mobility training,Transfer training,ADL/Self-care training,IADL training,Manual Therapy - Soft Tissue Mobilization,Neuromuscular re-education,Stair training,Gait training,Home exercise program,Safety education & training,Patient/Caregiver education & training,Therapeutic activities,Vestibular rehab,Modalities     Frequency / Duration:  Patient to be seen 1 for 8 weeks      Eval Complexity:    Decision Making: Low Complexity         Therapist Signature: Brittny Crump, PT , DPT   Date: 5/25/4700     I certify that the above Therapy Services are being furnished while the patient is under my care. I agree with the treatment plan and certify that this therapy is necessary.       [de-identified] Signature:  ___________________________   Date:_______

## 2022-06-13 NOTE — PLAN OF CARE
Outpatient Physical Therapy           [place]           [x] Phone: [phone]   Fax: [phone]  [place]           [] Phone: [phone]   Fax: [phone]     To:  Jess Xie, DO   From: Mellissa Driver, PT, DPT     Patient: Dany Apley       : 1940  Diagnosis:  Diagnosis: Disequilibrium syndrome  Treatment Diagnosis: impaired balance  Date: 2022    Physical Therapy Certification/Re-Certification Form  Dear Dr. Amber Garcia,  The following patient has been evaluated for physical therapy services and for therapy to continue, insurance requires physician review of the treatment plan initially and every 90 days. Please review the attached evaluation and/or summary of the patient's plan of care, and verify that you agree therapy should continue by signing the attached document and sending it back to our office. Assessment:    Assessment: Pt is a 71-year-old male who presents to therapy with worsening balance and eye movement issues since his fall due to passing out from Afib a year and a half ago. Pt denies any dizziness, including no room spinning, therefore BPPV was not tested this date. Pt does present with mild VOR impairment and impaired balance, most likely due to vestibular hypofunction. Pt would benefit from skilled therapy interventions to address listed impairments, progress toward goal completion and improve ADL/IADL status. PT also warranted to reduce risk for further injury or decline. Patient agrees with established plan of care and assisted in the development of their short term and long term goals. Patient had no adverse reaction with initial treatment and there are no barriers to learning. Learning preferences include demonstration, practice, and handouts. Patient expressed understanding of HEP and appears to be motivated to participate in an active PT program including compliance with HEP expectations.       Plan of Care/Treatment to date:  [x] Therapeutic Exercise  [] Modalities:  [x] Therapeutic Activity     [] Ultrasound  [] Electrical Stimulation  [x] Gait Training      [] Cervical Traction [] Lumbar Traction  [x] Neuromuscular Re-education    [] Cold/hotpack [] Iontophoresis   [x] Instruction in HEP      [] Vasopneumatic    [] Dry Needling  [x] Manual Therapy               [x] Canalith repositioning       Other:          Frequency/Duration:  # Days per week: [x] 1 day # Weeks: [] 1 week [] 5 weeks     [] 2 days   [] 2 weeks [] 6 weeks     [] 3 days   [] 3 weeks [] 7 weeks     [] 4 days   [] 4 weeks [x] 8 weeks         [] 9 weeks [] 10 weeks         [] 11 weeks [] 12 weeks    Rehab Potential/Progress: [] Excellent [x] Good [] Fair  [] Poor     Goals:    Patient goals: improve balance and eye movements  Short term goals  Time Frame for Short term goals: Refer to LTG                 Long Term Goals  Time Frame for Long Term Goals: 8 visits  Pt will improve DGI to 22 or more to show improved balanace and cut off score for safe ambulators  Pt will improve DHI to 34 or less to show MDC and subjective improvement in condition  Pt will be IND with HEP in order to maximize recovery outside of clinic  Pt will report overall improvement in condition by 50%           Electronically signed by:  Brittany Johnson, PT, DPT, 6/13/2022, 6:23 PM        If you have any questions or concerns, please don't hesitate to call.   Thank you for your referral.      Physician Signature:________________________________Date:_________ TIME: _____  By signing above, therapists plan is approved by physician

## 2022-06-13 NOTE — FLOWSHEET NOTE
Outpatient Physical Therapy  [place]           [x] Phone: [phone]   Fax: [phone]  [place]           [] Phone: [phone]   Fax: [phone]        Physical Therapy Daily Treatment Note  Date:  2022    Patient Name:  Denise Lancaster    :  1940  MRN: [id number]  Restrictions/Precautions: n/a  Diagnosis:    Diagnosis: Disequilibrium syndrome  Date of Injury/Surgery:   Treatment Diagnosis:  impaired balance  Insurance/Certification information: [facility] medicare  Referring Physician:   Lydia Hernandez DO   Next Doctor Visit:    Plan of care signed (Y/N):  Sent   Outcome Measure: DHI: 46. DGI:   Visit# / total visits:     Pain level: 0/10   Goals:     Patient goals: improve balance and eye movements  Short term goals  Time Frame for Short term goals: Refer to [facility]                 Long Term Goals  Time Frame for Long Term Goals: 8 visits  Pt will improve DGI to 22 or more to show improved balanace and cut off score for safe ambulators  Pt will improve DHI to 34 or less to show MDC and subjective improvement in condition  Pt will be IND with HEP in order to maximize recovery outside of clinic  Pt will report overall improvement in condition by 50%         Summary of Evaluation:  Assessment: Pt is a 68-year-old male who presents to therapy with worsening balance and eye movement issues since his fall due to passing out from Afib a year and a half ago. Pt denies any dizziness, including no room spinning, therefore BPPV was not tested this date. Pt does present with mild VOR impairment and impaired balance, most likely due to vestibular hypofunction. Pt would benefit from skilled therapy interventions to address listed impairments, progress toward goal completion and improve ADL/IADL status. PT also warranted to reduce risk for further injury or decline. Subjective:  See eval         Any changes in Ambulatory Summary Sheet?   None        Objective:  See eval   COVID screening questions were asked and patient attested that there had been no contact or symptoms        Exercises: (No more than 4 columns)   Exercise/Equipment 6/13/22 #1 Date Date           WARM UP                     TABLE      VOR x1 Horizontal and vertical                                STANDING                                                     PROPRIOCEPTION      Static standing Foam romberg EO/EC                             MODALITIES                      Other Therapeutic Activities/Education:  HEP and importance of completion, POC and goals, anatomy and physiology related to condition    Home Exercise Program: Issued, practiced and pt demo ability to perform 6/13/2022      Manual Treatments:  none      Modalities:  none      Communication with other providers:  POC Sent 6/13      Assessment:  Pt tolerated today's treatment without any adverse reactions or complications this date. Assessment: Pt is a 68-year-old male who presents to therapy with worsening balance and eye movement issues since his fall due to passing out from Afib a year and a half ago. Pt denies any dizziness, including no room spinning, therefore BPPV was not tested this date. Pt does present with mild VOR impairment and impaired balance, most likely due to vestibular hypofunction. Pt would benefit from skilled therapy interventions to address listed impairments, progress toward goal completion and improve ADL/IADL status. PT also warranted to reduce risk for further injury or decline.       Plan for Next Session:   Specific Instructions for Next Treatment: vestibular therapy, balance, VOR    Time In / Time Out:  6657 - 6181      Timed Code/Total Treatment Minutes:  29': 8' NMR x1, 21' Eval x1      Next Progress Note due: 10th visit       Plan of Care Interventions:  [x] Therapeutic Exercise  [] Modalities:  [x] Therapeutic Activity     [] Ultrasound  [] Estim  [x] Gait Training      [] Cervical Traction [] Lumbar Traction  [x] Neuromuscular Re-education    [] Cold/hotpack [] Iontophoresis   [x] Instruction in HEP      [] Vasopneumatic   [] Dry Needling    [x] Manual Therapy               [x] Canalith repositioning              Electronically signed by:  Katherine Chaney PT, DPT 6/13/2022, 6:23 PM

## 2022-06-20 ENCOUNTER — HOSPITAL ENCOUNTER (OUTPATIENT)
Dept: PHYSICAL THERAPY | Age: 82
Setting detail: THERAPIES SERIES
Discharge: HOME | End: 2022-06-20
Payer: MEDICARE

## 2022-06-20 PROCEDURE — 97112 NEUROMUSCULAR REEDUCATION: CPT

## 2022-06-20 NOTE — FLOWSHEET NOTE
None      Objective:   COVID screening questions were asked and patient attested that there had been no contact or symptoms    Increased difficulty and sway backwards gait vs fwd    Exercises: (No more than 4 columns)   Exercise/Equipment 6/13/22 #1 6/20/22 #2 Date           WARM UP                     TABLE      VOR x1 Horizontal and vertical Horizontal and vertical                     PROPRIOCEPTION      Static standing Foam romberg EO/EC Foam: romberg EO/EC, 1/2 tandem EO/EC    Foam strip  Sidestep  tandem    Cone taps  Foam between 1 Marching   EO/EC foam    Dynamic balance  Back EC  Fwd head turns    Dual tasking  Cone and ball fwd/ back    Rocker board  AP/ML          MODALITIES                      Other Therapeutic Activities/Education:  Safety concerns about balance HEP at home before pt is ready to complete    Home Exercise Program: Issued, practiced and pt demo ability to perform 6/13/2022      Manual Treatments:  none      Modalities:  none      Communication with other providers:  POC Sent 6/13      Assessment:  Pt tolerated today's treatment without any adverse reactions or complications this date. Pt demonstrated good balance w/ fwd gait this date but had difficulty and sway with any task + backwards walking. Increased sway with EC as well. Pt would continue to benefit from skilled therapy interventions to address remaining impairments, improve mobility and strength and progress toward goal completion while reducing risk for re-injury or further decline.   End pain: same    Plan for Next Session:   Specific Instructions for Next Treatment: vestibular therapy, balance, VOR    Time In / Time Out:  9797 - 8729    Timed Code/Total Treatment Minutes:  25': 25' NMR x2    Next Progress Note due: 10th visit     Plan of Care Interventions:  [x] Therapeutic Exercise  [] Modalities:  [x] Therapeutic Activity     [] Ultrasound  [] Estim  [x] Gait Training      [] Cervical Traction [] Lumbar Traction  [x] Neuromuscular Re-education    [] Cold/hotpack [] Iontophoresis   [x] Instruction in HEP      [] Vasopneumatic   [] Dry Needling    [x] Manual Therapy               [x] Canalith repositioning              Electronically signed by:  Errol Dykes PT, DPT 6/20/2022, 10:12 AM

## 2022-06-30 ENCOUNTER — HOSPITAL ENCOUNTER (OUTPATIENT)
Dept: PHYSICAL THERAPY | Age: 82
Setting detail: THERAPIES SERIES
Discharge: HOME | End: 2022-06-30
Payer: MEDICARE

## 2022-06-30 PROCEDURE — 97112 NEUROMUSCULAR REEDUCATION: CPT

## 2022-06-30 NOTE — FLOWSHEET NOTE
Outpatient Physical Therapy  [place]           [x] Phone: [phone]   Fax: [phone]  [place]           [] Phone: [phone]   Fax: [phone]        Physical Therapy Daily Treatment Note  Date:  2022    Patient Name:  Mariano Powers    :  1940  MRN: [id number]  Restrictions/Precautions: n/a  Diagnosis:    Diagnosis: Disequilibrium syndrome  Date of Injury/Surgery:   Treatment Diagnosis:  impaired balance  Insurance/Certification information: [facility] medicare  Referring Physician:   Sammy Beltre DO   Next Doctor Visit:    Plan of care signed (Y/N):  Y  Outcome Measure: DHI: 46. DGI:   Visit# / total visits:   3/8  Pain level: 0/10   Goals:     Patient goals: improve balance and eye movements  Short term goals  Time Frame for Short term goals: Refer to [facility]      Long Term Goals  Time Frame for Long Term Goals: 8 visits  Pt will improve DGI to 22 or more to show improved balanace and cut off score for safe ambulators  Pt will improve DHI to 34 or less to show MDC and subjective improvement in condition  Pt will be IND with HEP in order to maximize recovery outside of clinic  Pt will report overall improvement in condition by 50%         Summary of Evaluation:  Assessment: Pt is a 72-year-old male who presents to therapy with worsening balance and eye movement issues since his fall due to passing out from Afib a year and a half ago. Pt denies any dizziness, including no room spinning, therefore BPPV was not tested this date. Pt does present with mild VOR impairment and impaired balance, most likely due to vestibular hypofunction. Pt would benefit from skilled therapy interventions to address listed impairments, progress toward goal completion and improve ADL/IADL status. PT also warranted to reduce risk for further injury or decline. Subjective:  Pt is doing alright this date. He feels about the same, not having any dizziness today. Any changes in Ambulatory Summary Sheet? None      Objective:   COVID screening questions were asked and patient attested that there had been no contact or symptoms    LOB min A from PT to correct EC foam sidestepping    Exercises: (No more than 4 columns)   Exercise/Equipment 6/13/22 #1 6/20/22 #2 6/30/22 #3           WARM UP                     TABLE      VOR x1 Horizontal and vertical Horizontal and vertical Horizontal and vertical x1 and x2                    PROPRIOCEPTION      Static standing Foam romberg EO/EC Foam: romberg EO/EC, 1/2 tandem EO/EC Foam: romberg EO/EC, 1/2 tandem EO/EC    Foam strip  Sidestep  tandem Sidestep EO/EC  tandem   Cone taps  Foam between 1 Foam between 1   Marching   EO/EC foam EO/EC foam   Dynamic balance  Back EC  Fwd head turns Back EC  Head turn fwd/back   Dual tasking  Cone and ball fwd/ back    Rocker board  AP/ML AP/ML         MODALITIES                      Other Therapeutic Activities/Education:  [facility]    Home Exercise Program: Issued, practiced and pt demo ability to perform 6/13/2022      Manual Treatments:  none      Modalities:  none      Communication with other providers:  POC Sent 6/13      Assessment:  Pt tolerated today's treatment without any adverse reactions or complications this date. Pt demonstrated significant sway w/ EC during backwards walking and foam walking. No apparent dizziness w/ added VORx2. Pt would continue to benefit from skilled therapy interventions to address remaining impairments, improve mobility and strength and progress toward goal completion while reducing risk for re-injury or further decline.   End pain: same    Plan for Next Session:   Specific Instructions for Next Treatment: vestibular therapy, balance, VOR    Time In / Time Out:  4994 - 7189    Timed Code/Total Treatment Minutes:  24': 24' NMR x2    Next Progress Note due: 10th visit     Plan of Care Interventions:  [x] Therapeutic Exercise  [] Modalities:  [x] Therapeutic Activity     [] Ultrasound  [] Estim  [x] Gait Training      [] Cervical Traction [] Lumbar Traction  [x] Neuromuscular Re-education    [] Cold/hotpack [] Iontophoresis   [x] Instruction in HEP      [] Vasopneumatic   [] Dry Needling    [x] Manual Therapy               [x] Canalith repositioning              Electronically signed by:  Guillaume Beckett PT, DPT 6/30/2022, 8:18 AM

## 2022-07-01 ENCOUNTER — HOSPITAL ENCOUNTER (OUTPATIENT)
Age: 82
Discharge: HOME | End: 2022-07-01
Payer: MEDICARE

## 2022-07-01 LAB — TSH HIGH SENSITIVITY: 1.16 UIU/ML (ref 0.27–4.2)

## 2022-07-01 PROCEDURE — 84445 ASSAY OF TSI GLOBULIN: CPT

## 2022-07-01 PROCEDURE — 84480 ASSAY TRIIODOTHYRONINE (T3): CPT

## 2022-07-01 PROCEDURE — 84479 ASSAY OF THYROID (T3 OR T4): CPT

## 2022-07-01 PROCEDURE — 84436 ASSAY OF TOTAL THYROXINE: CPT

## 2022-07-01 PROCEDURE — 86800 THYROGLOBULIN ANTIBODY: CPT

## 2022-07-01 PROCEDURE — 84443 ASSAY THYROID STIM HORMONE: CPT

## 2022-07-01 PROCEDURE — 36415 COLL VENOUS BLD VENIPUNCTURE: CPT

## 2022-07-01 PROCEDURE — 86376 MICROSOMAL ANTIBODY EACH: CPT

## 2022-07-03 LAB
T3 TOTAL: 83 NG/DL (ref 80–200)
T3 UPTAKE PERCENT: 37 % (ref 28–41)
T4 TOTAL: 5.38 UG/DL (ref 4.5–11.7)
THYROTROPIN BINDING INHIBITORY IMMUNOGLOBULIN: <0.8 IU/L

## 2022-07-04 LAB
ANTITHYROGLOBULIN AB: <0.9 IU/ML (ref 0–4)
ANTITHYROID MICORSOMAL: 0.4 IU/ML (ref 0–9)
THYROID STIMULATING IMMUNOGLOBULIN: <0.1 IU/L

## 2022-07-14 ENCOUNTER — HOSPITAL ENCOUNTER (OUTPATIENT)
Dept: PHYSICAL THERAPY | Age: 82
Discharge: HOME | End: 2022-07-14

## 2022-07-14 NOTE — FLOWSHEET NOTE
Physical Therapy  Cancellation/No-show Note  Patient Name:  Lila Sweet  :  1940   Date:  2022  Cancelled visits to date: 1  No-shows to date: 0    For today's appointment patient:  [x]  Cancelled  []  Rescheduled appointment  []  No-show     Reason given by patient:  []  Patient ill  []  Conflicting appointment  []  No transportation    []  Conflict with work  []  No reason given  []  Other:     Comments:   Wife is sick    Electronically signed by:  Lobo Ulrich, PT, DPT

## 2022-07-19 ENCOUNTER — HOSPITAL ENCOUNTER (OUTPATIENT)
Dept: PHYSICAL THERAPY | Age: 82
Setting detail: THERAPIES SERIES
Discharge: HOME | End: 2022-07-19
Payer: MEDICARE

## 2022-07-19 PROCEDURE — 97112 NEUROMUSCULAR REEDUCATION: CPT

## 2022-07-19 NOTE — FLOWSHEET NOTE
is going back to the eye specialist but he has not had any dizziness recently. He is able to bend over and get back up without issues but will get dizzy when he gets back up if he stays bent over for too long. Pt has had no falls since therapy start. Any changes in Ambulatory Summary Sheet? None    Objective:   COVID screening questions were asked and patient attested that there had been no contact or symptoms    Mild dizziness w/ cog task and head turn on foam.    Exercises: (No more than 4 columns)   Exercise/Equipment 6/20/22 #2 6/30/22 #3 7/19/22 #4           WARM UP                     TABLE      VOR x1 Horizontal and vertical Horizontal and vertical x1 and x2 Reading colored words while on foam romberg    Scanning for colored objects while on foam romberg                    PROPRIOCEPTION      Static standing Foam: romberg EO/EC, 1/2 tandem EO/EC Foam: romberg EO/EC, 1/2 tandem EO/EC  Foam: romberg EO/EC, 1/2 tandem EO/EC    Foam strip Sidestep  tandem Sidestep EO/EC  tandem    Cone taps Foam between 1 Foam between 1 Marching  EO/EC foam EO/EC foam Foam EC   Dynamic balance Back EC  Fwd head turns Back EC  Head turn fwd/back    Dual tasking Cone and ball fwd/ back  Cone and ball w/ cog task fwd and back   Rocker board AP/ML AP/ML          MODALITIES                      Other Therapeutic Activities/Education:  HEP    Home Exercise Program: Issued, practiced and pt demo ability to perform 6/13/2022      Manual Treatments:  none      Modalities:  none      Communication with other providers:  POC Sent 6/13      Assessment:  Pt tolerated today's treatment without any adverse reactions or complications this date. Pt did have some mild difficulty with increased difficulty of tasks this date, but no significant increase in dizziness or major LOB, just sway. Goal re-check at next visit to determine dc vs continuation of POC.  Pt would continue to benefit from skilled therapy interventions to address remaining impairments, improve mobility and strength and progress toward goal completion while reducing risk for re-injury or further decline.   End pain: same    Plan for Next Session:   Specific Instructions for Next Treatment: vestibular therapy, balance, VOR    Time In / Time Out:  1430 - 1454    Timed Code/Total Treatment Minutes:  24': 24' NMR x2    Next Progress Note due: 10th visit     Plan of Care Interventions:  [x] Therapeutic Exercise  [] Modalities:  [x] Therapeutic Activity     [] Ultrasound  [] Estim  [x] Gait Training      [] Cervical Traction [] Lumbar Traction  [x] Neuromuscular Re-education    [] Cold/hotpack [] Iontophoresis   [x] Instruction in HEP      [] Vasopneumatic   [] Dry Needling    [x] Manual Therapy               [x] Canalith repositioning              Electronically signed by:  Allie Buck PT, DPT 7/19/2022, 8:14 AM

## 2022-07-29 ENCOUNTER — HOSPITAL ENCOUNTER (OUTPATIENT)
Dept: PHYSICAL THERAPY | Age: 82
Setting detail: THERAPIES SERIES
Discharge: HOME | End: 2022-07-29
Payer: MEDICARE

## 2022-07-29 PROCEDURE — 97112 NEUROMUSCULAR REEDUCATION: CPT

## 2022-07-29 NOTE — FLOWSHEET NOTE
Outpatient Physical Therapy  [place]           [x] Phone: [phone]   Fax: [phone]  [facility]           [] Phone: [phone]   Fax: [phone]        Physical Therapy Daily Treatment Note  Date:  2022    Patient Name:  Dina Cushing    :  1940  MRN: [id number]  Restrictions/Precautions: n/a  Diagnosis:    Diagnosis: Disequilibrium syndrome  Date of Injury/Surgery:   Treatment Diagnosis:  impaired balance  Insurance/Certification information: [facility] medicare  Referring Physician:   Hermelindo Shane DO   Next Doctor Visit:    Plan of care signed (Y/N):  Y  Outcome Measure: DHI: 46. DGI:   Visit# / total visits:     Pain level: 0/10   Goals:     Patient goals: improve balance and eye movements  Short term goals  Time Frame for Short term goals: Refer to [facility]      Long Term Goals  Time Frame for Long Term Goals: 8 visits  Pt will improve DGI to 22 or more to show improved balanace and cut off score for safe ambulators  Pt will improve DHI to 34 or less to show MDC and subjective improvement in condition  Pt will be IND with HEP in order to maximize recovery outside of clinic  Pt will report overall improvement in condition by 50%       Summary of Evaluation:  Assessment: Pt is a 53-year-old male who presents to therapy with worsening balance and eye movement issues since his fall due to passing out from Afib a year and a half ago. Pt denies any dizziness, including no room spinning, therefore BPPV was not tested this date. Pt does present with mild VOR impairment and impaired balance, most likely due to vestibular hypofunction. Pt would benefit from skilled therapy interventions to address listed impairments, progress toward goal completion and improve ADL/IADL status. PT also warranted to reduce risk for further injury or decline. Subjective:  Pt notes that he is doing well and feels he is somewhat better than last session. No current dizziness.     Any changes in Ambulatory Summary Sheet? None    Objective:   COVID screening questions were asked and patient attested that there had been no contact or symptoms    One LOB off foam min A to correct    Exercises: (No more than 4 columns)   Exercise/Equipment 6/30/22 #3 7/19/22 #4 7/29/22 #5           WARM UP                     TABLE      VOR x1 Horizontal and vertical x1 and x2 Reading colored words while on foam romberg    Scanning for colored objects while on foam romberg Reading colored words while on foam romberg    Scanning for colored objects while on foam romberg    Head circles while reading numbers    smooth pursuits with numbers on foam                    PROPRIOCEPTION      Static standing Foam: romberg EO/EC, 1/2 tandem EO/EC  Foam: romberg EO/EC, 1/2 tandem EO/EC  Foam: romberg EO/EC, 1/2 tandem EO/EC    Foam strip Sidestep EO/EC  tandem     Cone taps Foam between 1     Marching  EO/EC foam Foam EC EO/EC foam   Dynamic balance Back EC  Head turn fwd/back     Dual tasking  Cone and ball w/ cog task fwd and back Ball behind back in various directions w/ cog task   Rocker board AP/ML           MODALITIES                      Other Therapeutic Activities/Education:  [facility]    Home Exercise Program: Issued, practiced and pt demo ability to perform 6/13/2022      Manual Treatments:  none      Modalities:  none      Communication with other providers:  POC Sent 6/13      Assessment:  Pt tolerated today's treatment without any adverse reactions or complications this date. Pt did have one LOB EC marching on foam this date. Also had difficulty w/ coordination during ball pass w/ cog task while waking. He is improving and had no dizziness, even with more challenging head turn activities this date. Pt would continue to benefit from skilled therapy interventions to address remaining impairments, improve mobility and strength and progress toward goal completion while reducing risk for re-injury or further decline.   End pain: same    Plan for Next Session:   Specific Instructions for Next Treatment: vestibular therapy, balance, VOR    Time In / Time Out:  6954 - 1097    Timed Code/Total Treatment Minutes:  26': 26' NMR x2    Next Progress Note due: 10th visit     Plan of Care Interventions:  [x] Therapeutic Exercise  [] Modalities:  [x] Therapeutic Activity     [] Ultrasound  [] Estim  [x] Gait Training      [] Cervical Traction [] Lumbar Traction  [x] Neuromuscular Re-education    [] Cold/hotpack [] Iontophoresis   [x] Instruction in HEP      [] Vasopneumatic   [] Dry Needling    [x] Manual Therapy               [x] Canalith repositioning              Electronically signed by:  Sanchez Qiu PT, DPT 7/29/2022, 7:01 AM

## 2022-08-04 ENCOUNTER — HOSPITAL ENCOUNTER (OUTPATIENT)
Dept: PHYSICAL THERAPY | Age: 82
Setting detail: THERAPIES SERIES
Discharge: HOME | End: 2022-08-04
Payer: MEDICARE

## 2022-08-04 PROCEDURE — 97112 NEUROMUSCULAR REEDUCATION: CPT

## 2022-08-04 NOTE — FLOWSHEET NOTE
Outpatient Physical Therapy  [place]           [x] Phone: [phone]   Fax: [phone]  [place]           [] Phone: [phone]   Fax: [phone]        Physical Therapy Daily Treatment Note  Date:  2022    Patient Name:  Grace Yusuf    :  1940  MRN: [id number]  Restrictions/Precautions: n/a  Diagnosis:    Diagnosis: Disequilibrium syndrome  Date of Injury/Surgery:   Treatment Diagnosis:  impaired balance  Insurance/Certification information: [facility] medicare  Referring Physician:   Debbie Sanchez DO   Next Doctor Visit:    Plan of care signed (Y/N):  Y  Outcome Measure: DHI: 46. DGI:   Visit# / total visits:     Pain level: 0/10   Goals:     Patient goals: improve balance and eye movements  Short term goals  Time Frame for Short term goals: Refer to [facility]      Long Term Goals  Time Frame for Long Term Goals: 8 visits  Pt will improve DGI to 22 or more to show improved balanace and cut off score for safe ambulators  Pt will improve DHI to 34 or less to show MDC and subjective improvement in condition  Pt will be IND with HEP in order to maximize recovery outside of clinic  Pt will report overall improvement in condition by 50%       Summary of Evaluation:  Assessment: Pt is a 68-year-old male who presents to therapy with worsening balance and eye movement issues since his fall due to passing out from Afib a year and a half ago. Pt denies any dizziness, including no room spinning, therefore BPPV was not tested this date. Pt does present with mild VOR impairment and impaired balance, most likely due to vestibular hypofunction. Pt would benefit from skilled therapy interventions to address listed impairments, progress toward goal completion and improve ADL/IADL status. PT also warranted to reduce risk for further injury or decline. Subjective:  pt is doing well this date.  He is having a little more unsteadiness today but is no longer having dizziness and has not been dizzy all last week.    Any changes in Ambulatory Summary Sheet? None    Objective:   COVID screening questions were asked and patient attested that there had been no contact or symptoms    Mild sway w/ EC fwd gait in both directions    Exercises: (No more than 4 columns)   Exercise/Equipment 7/19/22 #4 7/29/22 #5 8/4/22 #6           WARM UP                     TABLE      VOR x1 Reading colored words while on foam romberg    Scanning for colored objects while on foam romberg Reading colored words while on foam romberg    Scanning for colored objects while on foam romberg    Head circles while reading numbers    smooth pursuits with numbers on foam                     PROPRIOCEPTION      Static standing Foam: romberg EO/EC, 1/2 tandem EO/EC  Foam: romberg EO/EC, 1/2 tandem EO/EC  Foam: romberg EO/EC, 1/2 tandem EO/EC    Foam strip   Sidestepping   tandem   Cone taps   Foam between 1   Marching  Foam EC EO/EC foam EO/EC foam   Dynamic balance   Gait w/ head turns  Fwd EC  Back EO   Dual tasking Cone and ball w/ cog task fwd and back Ball behind back in various directions w/ cog task Cone and ball w/ cog task fwd and back   Rocker board   AP and ML EO   Foam  taps to cone   Alternating x10   MODALITIES                      Other Therapeutic Activities/Education:  HEP    Home Exercise Program: Issued, practiced and pt demo ability to perform 6/13/2022      Manual Treatments:  none      Modalities:  none      Communication with other providers:  POC Sent 6/13      Assessment:  Pt tolerated today's treatment without any adverse reactions or complications this date. Pt did well this date, no dizziness with any activity. Pt did have some sway with balance challenges but no major LOB. Following sessions to focus on balance due to pt no longer having dizziness.  Pt would continue to benefit from skilled therapy interventions to address remaining impairments, improve mobility and strength and progress toward goal completion while reducing risk for re-injury or further decline.   End pain: same    Plan for Next Session:   Specific Instructions for Next Treatment: vestibular therapy, balance, VOR    Time In / Time Out:  1400 - 1424    Timed Code/Total Treatment Minutes:  24': 24' NMR x2    Next Progress Note due: 10th visit     Plan of Care Interventions:  [x] Therapeutic Exercise  [] Modalities:  [x] Therapeutic Activity     [] Ultrasound  [] Estim  [x] Gait Training      [] Cervical Traction [] Lumbar Traction  [x] Neuromuscular Re-education    [] Cold/hotpack [] Iontophoresis   [x] Instruction in HEP      [] Vasopneumatic   [] Dry Needling    [x] Manual Therapy               [x] Canalith repositioning              Electronically signed by:  Benedict Al PT, DPT 8/4/2022, 11:38 AM

## 2022-08-11 ENCOUNTER — HOSPITAL ENCOUNTER (OUTPATIENT)
Dept: PHYSICAL THERAPY | Age: 82
Setting detail: THERAPIES SERIES
Discharge: HOME | End: 2022-08-11
Payer: MEDICARE

## 2022-08-11 PROCEDURE — 97112 NEUROMUSCULAR REEDUCATION: CPT

## 2022-08-11 NOTE — FLOWSHEET NOTE
Outpatient Physical Therapy  [place]           [x] Phone: [phone]   Fax: [phone]  [facility]           [] Phone: [phone]   Fax: [phone]        Physical Therapy Daily Treatment Note  Date:  2022    Patient Name:  Kasey Cintron    :  1940  MRN: [id number]  Restrictions/Precautions: n/a  Diagnosis:    Diagnosis: Disequilibrium syndrome  Date of Injury/Surgery:   Treatment Diagnosis:  impaired balance  Insurance/Certification information: [facility] medicare  Referring Physician:   Shelia Conway DO   Next Doctor Visit:    Plan of care signed (Y/N):  Y  Outcome Measure: DHI: 46. DGI:   Visit# / total visits:   /  Pain level: 0/10   Goals:     Patient goals: improve balance and eye movements  Short term goals  Time Frame for Short term goals: Refer to [facility]      Long Term Goals  Time Frame for Long Term Goals: 8 visits  Pt will improve DGI to 22 or more to show improved balanace and cut off score for safe ambulators  Pt will improve DHI to 34 or less to show MDC and subjective improvement in condition  Pt will be IND with HEP in order to maximize recovery outside of clinic  Pt will report overall improvement in condition by 50%       Summary of Evaluation:  Assessment: Pt is a 22-year-old male who presents to therapy with worsening balance and eye movement issues since his fall due to passing out from Afib a year and a half ago. Pt denies any dizziness, including no room spinning, therefore BPPV was not tested this date. Pt does present with mild VOR impairment and impaired balance, most likely due to vestibular hypofunction. Pt would benefit from skilled therapy interventions to address listed impairments, progress toward goal completion and improve ADL/IADL status. PT also warranted to reduce risk for further injury or decline. Subjective:  Pt reports no dizziness today or any point this week. Pt states he feels good this date. Any changes in Ambulatory Summary Sheet? None    Objective:   COVID screening questions were asked and patient attested that there had been no contact or symptoms    Pt had increased sway during EC balance exercises and difficulty responding correctly during dual task interventions    Exercises: (No more than 4 columns)   Exercise/Equipment 7/29/22 #5 8/4/22 #6 8/11/22 #7           WARM UP                     TABLE      VOR x1 Reading colored words while on foam romberg    Scanning for colored objects while on foam romberg    Head circles while reading numbers    smooth pursuits with numbers on foam                      PROPRIOCEPTION      Static standing Foam: romberg EO/EC, 1/2 tandem EO/EC  Foam: romberg EO/EC, 1/2 tandem EO/EC  Foam: romberg EO/EC, 1/2 tandem EO/EC    Foam strip  Sidestepping   tandem Sidestepping   tandem   Cone taps  Foam between 1 Foam, 2 cones, tap both before back to normal    Marching  EO/EC foam EO/EC foam EO/EC foam   Dynamic balance  Gait w/ head turns  Fwd EC  Back EO Gait w/ head turns   Fwd EC  Back EC   Dual tasking Ball behind back in various directions w/ cog task Cone and ball w/ cog task fwd and back Cone and ball w/ cog task fwd and back    Rocker board  AP and ML EO    Foam  taps to cone  Alternating x10 Alternating 2x10    MODALITIES                      Other Therapeutic Activities/Education:  HEP    Home Exercise Program: Issued, practiced and pt demo ability to perform 6/13/2022      Manual Treatments:  none      Modalities:  none      Communication with other providers:  POC Sent 6/13      Assessment:  Pt tolerated today's treatment without any adverse reactions or complications this date. Pt did well this date, no dizziness with any activity. Pt did have some sway with balance challenges but no major LOB. Following sessions to focus on balance due to pt no longer having dizziness.  Pt would continue to benefit from skilled therapy interventions to address remaining impairments, improve mobility and strength and progress toward goal completion while reducing risk for re-injury or further decline.   End pain: same    Plan for Next Session:   Specific Instructions for Next Treatment: vestibular therapy, balance, VOR    Time In / Time Out: 1762-1374     Timed Code/Total Treatment Minutes:  34'; 34' NMR x2     Next Progress Note due: 10th visit     Plan of Care Interventions:  [x] Therapeutic Exercise  [] Modalities:  [x] Therapeutic Activity     [] Ultrasound  [] Estim  [x] Gait Training      [] Cervical Traction [] Lumbar Traction  [x] Neuromuscular Re-education    [] Cold/hotpack [] Iontophoresis   [x] Instruction in HEP      [] Vasopneumatic   [] Dry Needling    [x] Manual Therapy               [x] Canalith repositioning              Electronically signed by:  CAROLE Day 8/11/2022, 2:18 PM

## 2022-08-18 ENCOUNTER — HOSPITAL ENCOUNTER (OUTPATIENT)
Dept: PHYSICAL THERAPY | Age: 82
Setting detail: THERAPIES SERIES
Discharge: HOME | End: 2022-08-18
Payer: MEDICARE

## 2022-08-18 ENCOUNTER — OFFICE VISIT (OUTPATIENT)
Dept: NEUROLOGY | Age: 82
End: 2022-08-18
Payer: MEDICARE

## 2022-08-18 VITALS
SYSTOLIC BLOOD PRESSURE: 136 MMHG | DIASTOLIC BLOOD PRESSURE: 68 MMHG | HEIGHT: 70 IN | HEART RATE: 67 BPM | WEIGHT: 171 LBS | OXYGEN SATURATION: 98 % | BODY MASS INDEX: 24.48 KG/M2

## 2022-08-18 DIAGNOSIS — E87.8 DISEQUILIBRIUM SYNDROME: ICD-10-CM

## 2022-08-18 DIAGNOSIS — G60.9 IDIOPATHIC PERIPHERAL NEUROPATHY: Primary | ICD-10-CM

## 2022-08-18 PROCEDURE — 1036F TOBACCO NON-USER: CPT | Performed by: NURSE PRACTITIONER

## 2022-08-18 PROCEDURE — G8420 CALC BMI NORM PARAMETERS: HCPCS | Performed by: NURSE PRACTITIONER

## 2022-08-18 PROCEDURE — 1123F ACP DISCUSS/DSCN MKR DOCD: CPT | Performed by: NURSE PRACTITIONER

## 2022-08-18 PROCEDURE — G8427 DOCREV CUR MEDS BY ELIG CLIN: HCPCS | Performed by: NURSE PRACTITIONER

## 2022-08-18 PROCEDURE — 99213 OFFICE O/P EST LOW 20 MIN: CPT | Performed by: NURSE PRACTITIONER

## 2022-08-18 PROCEDURE — 97112 NEUROMUSCULAR REEDUCATION: CPT

## 2022-08-18 NOTE — FLOWSHEET NOTE
Outpatient Physical Therapy  [place]           [x] Phone: [phone]   Fax: [phone]  [facility] [place]           [] Phone: [phone]   Fax: [phone]        Physical Therapy Daily Treatment Note  Date:  2022    Patient Name:  Silvana Amado    :  1940  MRN: [id number]  Restrictions/Precautions: n/a  Diagnosis:    Diagnosis: Disequilibrium syndrome  Date of Injury/Surgery:   Treatment Diagnosis:  impaired balance  Insurance/Certification information: [facility] medicare  Referring Physician:   Elizabeth Lugo DO   Next Doctor Visit:    Plan of care signed (Y/N):  Y  Outcome Measure: DHI: 2. DGI:   Visit# / total visits:     Pain level: 0/10   Goals:     Patient goals: improve balance and eye movements  Short term goals  Time Frame for Short term goals: Refer to [facility]      Long Term Goals  Time Frame for Long Term Goals: 8 visits  Pt will improve DGI to 22 or more to show improved balanace and cut off score for safe ambulators: MET   Pt will improve DHI to 34 or less to show [address] and subjective improvement in condition: MET   Pt will be IND with HEP in order to maximize recovery outside of clinic: MET   Pt will report overall improvement in condition by 50%: MET        Summary of Evaluation:  Assessment: Pt is a 32-year-old male who presents to therapy with worsening balance and eye movement issues since his fall due to passing out from Afib a year and a half ago. Pt denies any dizziness, including no room spinning, therefore BPPV was not tested this date. Pt does present with mild VOR impairment and impaired balance, most likely due to vestibular hypofunction. Pt would benefit from skilled therapy interventions to address listed impairments, progress toward goal completion and improve ADL/IADL status. PT also warranted to reduce risk for further injury or decline. Subjective:  Pt is doing well this date.  He is no longer having any dizziness and has not had any for the last few weeks. He has had no falls since eval. He notes that he still will have some periods of unsteadiness but overall feels his balance has improved. Pt reports overall his balance is about 85% better than eval and dizziness is 100% better. DHI: 2    Any changes in Ambulatory Summary Sheet? None    Objective:   COVID screening questions were asked and patient attested that there had been no contact or symptoms    DGI: 23/24    Exercises: (No more than 4 columns)   Exercise/Equipment 8/4/22 #6 8/11/22 #7 8/18/22 #8           WARM UP                     TABLE      VOR x1                       PROPRIOCEPTION      Static standing Foam: romberg EO/EC, 1/2 tandem EO/EC  Foam: romberg EO/EC, 1/2 tandem EO/EC  Foam: romberg EO/EC, 1/2 tandem EO/EC    Foam strip Sidestepping   tandem Sidestepping   tandem    Cone taps Foam between 1 Foam, 2 cones, tap both before back to normal  Foam, 2 cones, tap both before back to normal    Marching  EO/EC foam EO/EC foam EO/EC foam   Dynamic balance Gait w/ head turns  Fwd EC  Back EO Gait w/ head turns   Fwd EC  Back EC Gait w/ head turns   Fwd EC  Back EO  Turning  Obstacles    Dual tasking Cone and ball w/ cog task fwd and back Cone and ball w/ cog task fwd and back  Cone and ball w/ cog task fwd and back    Rocker board AP and ML EO     Foam  taps to cone Alternating x10 Alternating 2x10     MODALITIES                      Other Therapeutic Activities/Education:  HEP after dc for max benefits    Home Exercise Program: Issued, practiced and pt demo ability to perform 6/13/2022      Manual Treatments:  none      Modalities:  none      Communication with other providers:  POC Sent 6/13      Assessment:  Pt tolerated today's treatment without any adverse reactions or complications this date. Pt has shown very good progress since therapy start, no longer having any dizziness and has significantly improved balance, is not having any falls at home.  He has met all goals at this time and is no longer having any major limitations with functional mobility. PT educated pt on continuation of HEP after discharge for max benefits and reduced risk for future decline. Pt dc this date.   End pain: same    Plan for Next Session:   Specific Instructions for Next Treatment: vestibular therapy, balance, VOR    Time In / Time Out:  0144 - 6531    Timed Code/Total Treatment Minutes:  23': 23' NMR x2     Next Progress Note due: 10th visit     Plan of Care Interventions:  [x] Therapeutic Exercise  [] Modalities:  [x] Therapeutic Activity     [] Ultrasound  [] Estim  [x] Gait Training      [] Cervical Traction [] Lumbar Traction  [x] Neuromuscular Re-education    [] Cold/hotpack [] Iontophoresis   [x] Instruction in HEP      [] Vasopneumatic   [] Dry Needling    [x] Manual Therapy               [x] Canalith repositioning              Electronically signed by:  Nicole Carson, PT, DPT 8/18/2022, 8:29 AM

## 2022-08-18 NOTE — PROGRESS NOTES
8/18/22    [facility]  1940    Chief Complaint   Patient presents with    Dizziness     Pt presents for f/u of dizziness, pt states no dizzy episodes but is still uneasy on his feet due to balance issues       History of Present Illness  Kati Cole is a 80 y.o. male presenting today for follow-up of headaches and dizziness. Howard King does have a cardiac history he has had stents in the past, he has atrial fibrillation and he is on anticoagulation. At his initial visit with Dr. Breezy Rojo described difficulty with his balance. He had double vision when he looks far left or right and pressure behind his eyes. He reported  29 out of 30 headache days. He does have sleep apnea and wears a CPAP. He has a history of hearing loss and tinnitus. He was initiated on a scopolamine patch for his peripheral vertigo symptoms as well as vestibular therapy. He was evaluated for peripheral neuropathy with an EMG and labs. His labs for reversible causes of neuropathy came back normal.  He did not get an EMG yet. It was recommended that if Sherry Sanchez did not have an improvement with these measures we could discuss a low-dose gabapentin 100 mg 2-3 times daily for headache prevention. On today's visit, Osito's headaches have resolved completely. He is participating in vestibular and balance therapy at this time and states it is helping. He denies any falls. He states he thinks his off balance feeling is related to his vision. He has an ophthalmology appointment tomorrow. He states the Scopalamine patch did not help for the vertigo. EMG is scheduled for next month. He states he feels numbness in his feet, he denies neuropathic pain.          Current Outpatient Medications   Medication Sig Dispense Refill    losartan (COZAAR) 25 MG tablet TAKE 1 TABLET BY MOUTH EVERY DAY      montelukast (SINGULAIR) 10 MG tablet TAKE 1 TABLET BY MOUTH EVERY DAY 90 tablet 1    AMLODIPINE BENZOATE PO Take by mouth      ELIQUIS 5 MG TABS tablet TAKE 1 Jonathan Webb was seen in neurological follow up in regards to balance issues. Hammad Colunga has had much improvement in his symptoms from his initial visit. His headaches and eye pressure have subsided. He still feels off balance however he tells me therapy has been very beneficial. He will continue to follow up with ophthalmology. We will plan on completing his EMG in one month to evaluate for peripheral neuropathy contributing to feeling off balance. Return in about 3 months (around 11/18/2022).     Alvaro Golden, TYRA - CNP

## 2022-08-18 NOTE — DISCHARGE SUMMARY
Outpatient Physical Therapy           [place]           [x] Phone: [phone]   Fax: [phone]  [place] Her[facility]           [] Phone: [phone]   Fax: [phone]      To: Armani Howard DO     From: Ronna Peters, PT, DPT     Patient: Sanjuanita Sierra                    : 1940  Diagnosis:  Disequilibrium syndrome  Treatment Diagnosis:   impaired balance    Date: 2022  []  Progress Note                [x]  Discharge Note    Evaluation Date:  22  Total Visits to date:  8 Cancels/No-shows to date:  1    Subjective:  Pt is doing well this date. He is no longer having any dizziness and has not had any for the last few weeks. He has had no falls since eval. He notes that he still will have some periods of unsteadiness but overall feels his balance has improved. Pt reports overall his balance is about 85% better than eval and dizziness is 100% better. DHI: 2    Plan of Care/Treatment to date:  [x] Therapeutic Exercise    [] Modalities:  [x] Therapeutic Activity     [] Ultrasound  [] Electrical Stimulation  [x] Gait Training      [] Cervical Traction   [] Lumbar Traction  [x] Neuromuscular Re-education  [] Cold/hotpack [] Iontophoresis  [x] Instruction in HEP      Other:  [x] Manual Therapy       []  Vasopneumatic  [] Aquatic Therapy       []   Dry Needle Therapy                      Objective/Significant Findings At Last Visit/Comments:    DGI:     Assessment:    Pt has shown very good progress since therapy start, no longer having any dizziness and has significantly improved balance, is not having any falls at home. He has met all goals at this time and is no longer having any major limitations with functional mobility. PT educated pt on continuation of HEP after discharge for max benefits and reduced risk for future decline. Pt dc this date.     Goal Status:  [x] Achieved [] Partially Achieved  [] Not Achieved     Patient goals: improve balance and eye movements  Short term goals  Time Frame for Short term goals: Refer to LTG   Long Term Goals  Time Frame for Long Term Goals: 8 visits  Pt will improve DGI to 22 or more to show improved balanace and cut off score for safe ambulators: MET 8/18  Pt will improve DHI to 34 or less to show [address] and subjective improvement in condition: MET 8/18  Pt will be IND with HEP in order to maximize recovery outside of clinic: MET 8/18  Pt will report overall improvement in condition by 50%: MET 8/18    Patient Status: [] Continue per initial plan of Care     [x] Patient now discharged     [] Additional visits requested, Please re-certify for additional visits: If we are requesting more visits, we fully anticipate the patient's condition is expected to improve within the treatment timeframe we are requesting. Electronically signed by:  Myrla Mcburney, PT, DPT, 8/18/2022, 8:30 AM    If you have any questions or concerns, please don't hesitate to call.   Thank you for your referral.    Physician Signature:______________________ Date:______ Time: ________  By signing above, therapists plan is approved by physician

## 2022-09-01 ENCOUNTER — HOSPITAL ENCOUNTER (OUTPATIENT)
Dept: CT IMAGING | Age: 82
Discharge: HOME | End: 2022-09-01
Payer: MEDICARE

## 2022-09-01 DIAGNOSIS — H05.89 ORBITAL FLOOR SYNDROME: ICD-10-CM

## 2022-09-01 PROCEDURE — 70480 CT ORBIT/EAR/FOSSA W/O DYE: CPT

## 2022-09-19 ENCOUNTER — OFFICE VISIT (OUTPATIENT)
Dept: NEUROLOGY | Age: 82
End: 2022-09-19
Payer: MEDICARE

## 2022-09-19 DIAGNOSIS — G60.9 IDIOPATHIC PERIPHERAL NEUROPATHY: ICD-10-CM

## 2022-09-19 PROCEDURE — 95886 MUSC TEST DONE W/N TEST COMP: CPT | Performed by: PHYSICAL MEDICINE & REHABILITATION

## 2022-09-19 PROCEDURE — 95910 NRV CNDJ TEST 7-8 STUDIES: CPT | Performed by: PHYSICAL MEDICINE & REHABILITATION

## 2022-09-19 NOTE — PROGRESS NOTES
EMG    Risks and benefits of study discussed. Specific and common risks of pain and bleeding as well as uncommon side effects of infection, hematoma and vasovagal episodes    Patient agreeable to testing and consents to such. Clinical: Foot and lower leg numbness for several years. Mild to moderate balance difficulties also for several years. History of upper lumbar compression fracture L1, but without surgery or radiculopathy at lower lumbar segments. Motor NCS:  Tibial amplitudes are borderline with normal latencies  Peroneal amplitudes are severely depressed with mildly slow latencies and conduction velocities. 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 #2  No findings to suggest a proximal or focal lesion such as radiculopathy, plexopathy or mononeuropathy.

## 2022-10-12 ENCOUNTER — HOSPITAL ENCOUNTER (EMERGENCY)
Age: 82
Discharge: HOME | End: 2022-10-12
Payer: MEDICARE

## 2022-10-12 VITALS
HEIGHT: 70 IN | DIASTOLIC BLOOD PRESSURE: 85 MMHG | WEIGHT: 160 LBS | SYSTOLIC BLOOD PRESSURE: 141 MMHG | BODY MASS INDEX: 22.9 KG/M2 | OXYGEN SATURATION: 99 % | RESPIRATION RATE: 18 BRPM | HEART RATE: 61 BPM | TEMPERATURE: 98.1 F

## 2022-10-12 DIAGNOSIS — R79.89 ELEVATED SERUM CREATININE: ICD-10-CM

## 2022-10-12 DIAGNOSIS — R09.89 LABILE BLOOD PRESSURE: Primary | ICD-10-CM

## 2022-10-12 LAB
ALBUMIN SERPL-MCNC: 4.2 GM/DL (ref 3.4–5)
ALP BLD-CCNC: 67 IU/L (ref 40–129)
ALT SERPL-CCNC: 27 U/L (ref 10–40)
ANION GAP SERPL CALCULATED.3IONS-SCNC: 8 MMOL/L (ref 4–16)
AST SERPL-CCNC: 30 IU/L (ref 15–37)
BASOPHILS ABSOLUTE: 0 K/CU MM
BASOPHILS RELATIVE PERCENT: 0.5 % (ref 0–1)
BILIRUB SERPL-MCNC: 0.3 MG/DL (ref 0–1)
BILIRUBIN URINE: NEGATIVE
BLOOD, URINE: NEGATIVE
BUN BLDV-MCNC: 19 MG/DL (ref 6–23)
CALCIUM SERPL-MCNC: 9.1 MG/DL (ref 8.3–10.6)
CHLORIDE BLD-SCNC: 101 MMOL/L (ref 99–110)
CLARITY: CLEAR
CO2: 21 MMOL/L (ref 21–32)
COLOR: YELLOW
CREAT SERPL-MCNC: 1.4 MG/DL (ref 0.9–1.3)
DIFFERENTIAL TYPE: ABNORMAL
EKG ATRIAL RATE: 60 BPM
EKG DIAGNOSIS: NORMAL
EKG P AXIS: 14 DEGREES
EKG P-R INTERVAL: 136 MS
EKG Q-T INTERVAL: 424 MS
EKG QRS DURATION: 90 MS
EKG QTC CALCULATION (BAZETT): 424 MS
EKG R AXIS: -6 DEGREES
EKG T AXIS: -22 DEGREES
EKG VENTRICULAR RATE: 60 BPM
EOSINOPHILS ABSOLUTE: 0.1 K/CU MM
EOSINOPHILS RELATIVE PERCENT: 2.2 % (ref 0–3)
GFR AFRICAN AMERICAN: 59 ML/MIN/1.73M2
GFR NON-AFRICAN AMERICAN: 49 ML/MIN/1.73M2
GLUCOSE BLD-MCNC: 99 MG/DL (ref 70–99)
GLUCOSE, URINE: NEGATIVE MG/DL
HCT VFR BLD CALC: 44 % (ref 42–52)
HEMOGLOBIN: 14.5 GM/DL (ref 13.5–18)
IMMATURE NEUTROPHIL %: 0.4 % (ref 0–0.43)
KETONES, URINE: NEGATIVE MG/DL
LEUKOCYTE ESTERASE, URINE: NEGATIVE
LYMPHOCYTES ABSOLUTE: 1.1 K/CU MM
LYMPHOCYTES RELATIVE PERCENT: 19.5 % (ref 24–44)
MCH RBC QN AUTO: 30.2 PG (ref 27–31)
MCHC RBC AUTO-ENTMCNC: 33 % (ref 32–36)
MCV RBC AUTO: 91.7 FL (ref 78–100)
MONOCYTES ABSOLUTE: 0.7 K/CU MM
MONOCYTES RELATIVE PERCENT: 12 % (ref 0–4)
NITRITE URINE, QUANTITATIVE: NEGATIVE
NUCLEATED RBC %: 0 %
PDW BLD-RTO: 12.8 % (ref 11.7–14.9)
PH, URINE: 6.5
PLATELET # BLD: 177 K/CU MM (ref 140–440)
PMV BLD AUTO: 10.5 FL (ref 7.5–11.1)
POTASSIUM SERPL-SCNC: 5.4 MMOL/L (ref 3.5–5.1)
PROTEIN UA: NEGATIVE MG/DL
RBC # BLD: 4.8 M/CU MM (ref 4.6–6.2)
SEGMENTED NEUTROPHILS ABSOLUTE COUNT: 3.6 K/CU MM
SEGMENTED NEUTROPHILS RELATIVE PERCENT: 65.4 % (ref 36–66)
SODIUM BLD-SCNC: 130 MMOL/L (ref 135–145)
SPECIFIC GRAVITY UA: <1.005
TOTAL IMMATURE NEUTOROPHIL: 0.02 K/CU MM
TOTAL NUCLEATED RBC: 0 K/CU MM
TOTAL PROTEIN: 6.9 GM/DL (ref 6.4–8.2)
TROPONIN T: <0.01 NG/ML
UROBILINOGEN, URINE: 0.2 MG/DL
WBC # BLD: 5.5 K/CU MM (ref 4–10.5)

## 2022-10-12 PROCEDURE — 80053 COMPREHEN METABOLIC PANEL: CPT

## 2022-10-12 PROCEDURE — 84484 ASSAY OF TROPONIN QUANT: CPT

## 2022-10-12 PROCEDURE — 81003 URINALYSIS AUTO W/O SCOPE: CPT

## 2022-10-12 PROCEDURE — 93010 ELECTROCARDIOGRAM REPORT: CPT | Performed by: INTERNAL MEDICINE

## 2022-10-12 PROCEDURE — 99284 EMERGENCY DEPT VISIT MOD MDM: CPT

## 2022-10-12 PROCEDURE — 85025 COMPLETE CBC W/AUTO DIFF WBC: CPT

## 2022-10-12 PROCEDURE — 93005 ELECTROCARDIOGRAM TRACING: CPT | Performed by: PHYSICIAN ASSISTANT

## 2022-10-12 ASSESSMENT — PAIN - FUNCTIONAL ASSESSMENT: PAIN_FUNCTIONAL_ASSESSMENT: NONE - DENIES PAIN

## 2022-10-12 NOTE — ED NOTES
Orthostatic BP    Lay   Oxygen 98%  /76    Sit  Oxygen 98%  /79    Stand  Oxygen 95%  /74     Berna Hall RN  10/12/22 0911

## 2022-10-12 NOTE — ED TRIAGE NOTES
Pt presents to the ED with c/o hypotension over the last 3 days. Pt states he is on blood pressure medication and does not check his pressure before administering his medications. Pt takes Losartan, Amlodipine and Eliquis. NAD. RR even and unlabored. Pt reports when his pressure becomes low he is symptomatic with dizziness. Pt states his pressure standing was 95/59 and 114/60 while sitting. Pt is A&Ox4.

## 2022-10-12 NOTE — ED PROVIDER NOTES
EMERGENCY DEPARTMENT [facility] EMERGENCY DEPARTMENT        TRIAGE CHIEF COMPLAINT:   Hypotension      [place]:  Daylin Barrios is a [de-identified] y.o. male that presents for hypotension. Onset was been intermittent for the last 3 days. Context is patient states that he has been taking his blood pressure frequently throughout the day and is noted some systolic blood pressures in the 80-90 range. States that he typically runs in the 1 4090 range. He is compliant with his antihypertensives, takes amlodipine, losartan as well as Eliquis for history of atrial fibrillation. Katelyn Tavarez He does endorse that when his blood pressure is low in the 80s he does feel somewhat lightheaded and dizzy but is denying any symptoms at this time. Denies any recent dosage changes in his antihypertensives but does state he has had some intentional weight loss over the last month. He is also concerned as he started on oral Bactrim for sinusitis prescribed by ENT, started on 10/1/2022 and is not sure this is contributing to his blood pressure issues. No other chest pain shortness of breath nausea vomiting or diarrhea. Has otherwise had normal appetite. No hematemesis coffee-ground emesis black tarry stools. Has had increased urine output without dysuria hematuria.   Denying any palpitations      ROS:  General:  No fevers, no chills   Cardiovascular:  No chest pain, no palpitations  Respiratory:  No shortness of breath, no cough, no wheezing  Gastrointestinal:  No pain, no nausea, no vomiting, no diarrhea  Musculoskeletal:  No muscle pain, no joint pain  Skin:  No rash, no pruritis, no easy bruising  Neurologic:  No speech problems, no headache, no extremity numbness, no extremity tingling, no extremity weakness  Psychiatric:  No anxiety  Genitourinary:  No dysuria, no hematuria  Extremities:  No edema    Past Medical History:   Diagnosis Date    Arthritis     CAD (coronary artery disease) Hyperlipidemia     Hypertension     S/P ablation of atrial fibrillation 01/21/2021    Atrial fibrillation and atrial flutter ablation by Dr. Jose R Child. Past Surgical History:   Procedure Laterality Date    COLONOSCOPY  2/4/13    Diverticulosis    CYST REMOVAL      baker cyst on right knee    HERNIA REPAIR      PTCA       History reviewed. No pertinent family history. Social History     Socioeconomic History    Marital status:      Spouse name: Not on file    Number of children: Not on file    Years of education: Not on file    Highest education level: Not on file   Occupational History    Not on file   Tobacco Use    Smoking status: Never    Smokeless tobacco: Never   Vaping Use    Vaping Use: Not on file   Substance and Sexual Activity    Alcohol use: Yes     Comment: occas    Drug use: No     Comment: 2 cups of coffee daily     Sexual activity: Not Currently     Partners: Female   Other Topics Concern    Not on file   Social History Narrative    Not on file     Social Determinants of Health     Financial Resource Strain: Not on file   Food Insecurity: Not on file   Transportation Needs: Not on file   Physical Activity: Not on file   Stress: Not on file   Social Connections: Not on file   Intimate Partner Violence: Not on file   Housing Stability: Not on file     No current facility-administered medications for this encounter.      Current Outpatient Medications   Medication Sig Dispense Refill    losartan (COZAAR) 25 MG tablet TAKE 1 TABLET BY MOUTH EVERY DAY      montelukast (SINGULAIR) 10 MG tablet TAKE 1 TABLET BY MOUTH EVERY DAY 90 tablet 1    AMLODIPINE BENZOATE PO Take by mouth      ELIQUIS 5 MG TABS tablet TAKE 1 TABLET BY MOUTH TWICE A DAY 60 tablet 5    sotalol (BETAPACE) 80 MG tablet Take 0.5 tablets by mouth 2 times daily 60 tablet 0    rosuvastatin (CRESTOR) 20 MG tablet Take 20 mg by mouth nightly      albuterol sulfate  (90 Base) MCG/ACT inhaler Inhale 2 puffs into the lungs every 6 hours as needed for Wheezing 1 Inhaler 5     No Known Allergies    Nursing Notes Reviewed  PHYSICAL EXAM    VITAL SIGNS: BP (!) 141/85   Pulse 61   Temp 98.1 °F (36.7 °C) (Oral)   Resp 18   Ht 5' 10\" (1.778 m)   Wt 160 lb (72.6 kg)   SpO2 99%   BMI 22.96 kg/m²    Constitutional:  Well developed, Well nourished, In no acute distress  Head:  Normocephalic, Atraumatic  Eyes: PERRL. EOMI. Sclera clear. Conjunctiva normal, No discharge. Neck/Lymphatics: Supple, no JVD, No lymphadenopathy  Cardiovascular:  RRR, Normal S1 & S2     Peripheral Vascular: Distal pulses 2+, Capillary refill <2seconds  Respiratory:  Respirations nonnlabored, Clear to auscultation bilaterally, No retractions  Abdomen: Bowel sounds normal in all quadrants, Soft, Non tender/Nondistended, No palpable abdominal masses. Musculoskeletal: BUE/BLE symmetrical without atrophy or deformities  Integument:  Warm, Dry, Intact, Skin turgor and texture normal  Neurologic:  Alert & oriented x3 , No focal deficits noted. Cranial nerves II through XII grossly intact. No slurred speech. No facial droop. Normal gross motor coordination & motor strength bilateral upper and lower extremities.     Psychiatric:  Affect appropriate      I have reviewed and interpreted all of the currently available lab results from this visit (if applicable):  Results for orders placed or performed during the hospital encounter of 10/12/22   Urinalysis   Result Value Ref Range    Color, UA YELLOW     Clarity, UA CLEAR     Glucose, Urine NEGATIVE MG/DL    Bilirubin Urine NEGATIVE     Ketones, Urine NEGATIVE MG/DL    Specific Gravity, UA <1.005     Blood, Urine NEGATIVE     pH, Urine 6.5     Protein, UA NEGATIVE MG/DL    Urobilinogen, Urine 0.2 MG/DL    Nitrite Urine, Quantitative NEGATIVE     Leukocyte Esterase, Urine NEGATIVE    CBC with Auto Differential   Result Value Ref Range    WBC 5.5 4.0 - 10.5 K/CU MM    RBC 4.80 4.6 - 6.2 M/CU MM    Hemoglobin 14.5 13.5 - 18.0 GM/DL    Hematocrit 44.0 42 - 52 %    MCV 91.7 78 - 100 FL    MCH 30.2 27 - 31 PG    MCHC 33.0 32.0 - 36.0 %    RDW 12.8 11.7 - 14.9 %    Platelets 375 289 - 893 K/CU MM    MPV 10.5 7.5 - 11.1 FL    Differential Type AUTOMATED DIFFERENTIAL     Segs Relative 65.4 36 - 66 %    Lymphocytes % 19.5 (L) 24 - 44 %    Monocytes % 12.0 (H) 0 - 4 %    Eosinophils % 2.2 0 - 3 %    Basophils % 0.5 0 - 1 %    Segs Absolute 3.6 K/CU MM    Lymphocytes Absolute 1.1 K/CU MM    Monocytes Absolute 0.7 K/CU MM    Eosinophils Absolute 0.1 K/CU MM    Basophils Absolute 0.0 K/CU MM    Nucleated RBC % 0.0 %    Total Nucleated RBC 0.0 K/CU MM    Total Immature Neutrophil 0.02 K/CU MM    Immature Neutrophil % 0.4 0 - 0.43 %   Comprehensive Metabolic Panel   Result Value Ref Range    Sodium 130 (L) 135 - 145 MMOL/L    Potassium 5.4 (H) 3.5 - 5.1 MMOL/L    Chloride 101 99 - 110 mMol/L    CO2 21 21 - 32 MMOL/L    BUN 19 6 - 23 MG/DL    Creatinine 1.4 (H) 0.9 - 1.3 MG/DL    Glucose 99 70 - 99 MG/DL    Calcium 9.1 8.3 - 10.6 MG/DL    Albumin 4.2 3.4 - 5.0 GM/DL    Total Protein 6.9 6.4 - 8.2 GM/DL    Total Bilirubin 0.3 0.0 - 1.0 MG/DL    ALT 27 10 - 40 U/L    AST 30 15 - 37 IU/L    Alkaline Phosphatase 67 40 - 129 IU/L    GFR Non- 49 (L) >60 mL/min/1.73m2    GFR  59 (L) >60 mL/min/1.73m2    Anion Gap 8 4 - 16   Troponin   Result Value Ref Range    Troponin T <0.010 <0.01 NG/ML   EKG 12 Lead   Result Value Ref Range    Ventricular Rate 60 BPM    Atrial Rate 60 BPM    P-R Interval 136 ms    QRS Duration 90 ms    Q-T Interval 424 ms    QTc Calculation (Bazett) 424 ms    P Axis 14 degrees    R Axis -6 degrees    T Axis -22 degrees    Diagnosis       Normal sinus rhythm  Septal infarct , age undetermined  Abnormal ECG  When compared with ECG of 19-OCT-2021 08:17,  Septal infarct is now present          Radiographs (if obtained):  [] The following radiograph was interpreted by myself in the absence of a radiologist: [] Radiologist's Report Reviewed:  No orders to display         EKG Interpretation  Please see ED physician's note - Dr. Ac Rivera - for EKG interpretation      Chart review shows recent radiographs:  No results found. ED COURSE & MEDICAL DECISION MAKING       Vital signs and nursing notes reviewed during ED course. I have independently evaluated this patient . Supervising physician - Dr. Ac Rivera - was present in ED and available for consultation throughout entirety of patient's care. All pertinent Lab data and radiographic results reviewed with patient at bedside. The patient and/or the family were informed of the results of any tests/labs/imaging, the treatment plan, and time was allotted to answer questions. Clinical Impression:  1. Labile blood pressure    2. Elevated serum creatinine        Patient presents with concern for imminent episodes of hypotension. On exam, well-appearing nontoxic 44-year-old male, in no acute respiratory distress. Vitals are stable, /89, not tachycardic hypoxic or tachypneic. Currently asymptomatic for any chest pain shortness of breath dizziness or lightheadedness. Unremarkable cardiopulmonary exam.  Abdomen soft nontender. No pitting edema or asymmetry in the lower extremities. Patient placed on telemetry and continuous pulse ox monitoring. Orthostatic vital signs do show a change in BP from laying 123/76 to standing 100/74 however patient was not denied for any dizziness or lightheadedness. No acute ischemic changes on EKG. Labs otherwise reassuring. Normal white count, hemoglobin and platelets. CMP with mild hyperkalemia 5.4, creatinine 1.4 with a BUN of 19. Normal troponin. UA unremarkable for ketones or proteins. At this time, patient's blood pressure continue remain above 347 systolic in the ED is not symptomatic for any chest pain lightheadedness or dizziness.   Although she does have some mild lab abnormalities on CMP, on chart review, has had similar creatinine levels several years ago however last comparative labs this year creatinine was 1.0. At this time, plan to consult with patient's PCP to discuss holding blood pressure medications and close follow-up for serial lab work. I did consult with Dr. Savita Barriga - PCP - and discussed patient's history, ED presentation/course including any pertinent laboratory findings and imaging study findings. He/she is comfortable patient being discharged home, is requesting that patient hold his losartan blood pressure med, continue rigorous clear oral fluid hydration and follow-up next week for recheck of blood pressure as well as repeat labs. Plan was discussed with patient who verbalized understanding agreement. Low clinical suspicion for bacteremia/sepsis, ACS/MI, dissection, severe anemia, severe electrolyte disturbance, tachyarrhythmia or other acute emergent process requiring admission. Patient is discharged stable condition with instructions to hold blood pressure medications as discussed above and hold radiation. Given a low threshold to return back to the ED or follow-up sooner with family doctor should he continue to have persistent hypotensive blood pressure readings. Otherwise he will continue his other home medications as normal.  Encouraged him to continue keeping a blood pressure diary. Return warnings discussed. Attending physician - Dr. Minal Matos - was consulted on this case, but did not independently evaluate the patient          Diagnosis and plan discussed in detail with patient who understands and agrees. Patient agrees to return emergency department if symptoms worsen or any new symptoms develop. Disposition referral (if applicable):   Shirlene Escobar MD  [address]  [phone]    Schedule an appointment as soon as possible for a visit in 3 days  Schedule followup appt with PCP next Tuesday 10/18/2022    [facility] [address]  [place] [facility] [id number]  [phone]  Go to   As needed, If symptoms worsen    Disposition medications (if applicable):  Discharge Medication List as of 10/12/2022  3:48 PM            (Please note that portions of this note may have been completed with a voice recognition program. Efforts were made to edit the dictations but occasionally words are mis-transcribed.)          Fran Archibald PA-C  10/12/22 0286

## 2022-10-12 NOTE — DISCHARGE INSTRUCTIONS
HOLD your LOSARTAN blood pressure medication until PCP followup    Continue other medications    Push oral fluid hydration at home    Continue blood pressure diary

## 2022-10-12 NOTE — ED PROVIDER NOTES
EKG shows sinus rhythm at 60. Good R wave progression. No ST elevation or depression. No ectopy. Appears similar to prior tracing.      Christyilee Sicks, DO  10/12/22 2913

## 2022-11-05 ENCOUNTER — HOSPITAL ENCOUNTER (EMERGENCY)
Age: 82
Discharge: HOME | End: 2022-11-05
Attending: EMERGENCY MEDICINE
Payer: MEDICARE

## 2022-11-05 VITALS
OXYGEN SATURATION: 98 % | DIASTOLIC BLOOD PRESSURE: 107 MMHG | SYSTOLIC BLOOD PRESSURE: 156 MMHG | TEMPERATURE: 97.8 F | HEART RATE: 63 BPM | HEIGHT: 70 IN | RESPIRATION RATE: 16 BRPM | WEIGHT: 170 LBS | BODY MASS INDEX: 24.34 KG/M2

## 2022-11-05 DIAGNOSIS — I10 ASYMPTOMATIC HYPERTENSION: Primary | ICD-10-CM

## 2022-11-05 PROCEDURE — 99283 EMERGENCY DEPT VISIT LOW MDM: CPT

## 2022-11-05 PROCEDURE — 93005 ELECTROCARDIOGRAM TRACING: CPT | Performed by: EMERGENCY MEDICINE

## 2022-11-05 ASSESSMENT — PAIN SCALES - GENERAL: PAINLEVEL_OUTOF10: 0

## 2022-11-05 ASSESSMENT — PAIN - FUNCTIONAL ASSESSMENT: PAIN_FUNCTIONAL_ASSESSMENT: 0-10

## 2022-11-05 NOTE — ED TRIAGE NOTES
Hx of high BP, This morning 190/94 denies other symptoms. Took meds this morning. Recently taken off losartan.

## 2022-11-05 NOTE — ED PROVIDER NOTES
EMERGENCY DEPARTMENT ENCOUNTER    [facility] EMERGENCY DEPARTMENT        TRIAGE CHIEF COMPLAINT:   Hypertension (Hx of high BP, This morning 190/94 denies other symptoms. Took BP meds this morning. )      [place]:  Candance Blare is a [de-identified] y.o. male that presents with concern for hypertension. Context is patient has a long history of hypertension, is managed by PCP for blood pressure with amlodipine, sotalol as well as losartan. He is compliant with all of all of his medications. He does state his family doctor recently took him off of his losartan 25 mg once daily dose proximately 3 weeks ago as \"my pressures were going up and down too much. \"  He has noted that since stopping the losartan, his blood pressures have been gradually trending upward. He has been asymptomatic for any chest pain shortness of breath headache dizziness or lightheadedness. Today when he checked his blood pressure, he noted it with systolic 697/24, which is the highest that it ever been and this concerned him so he came to the ED for evaluation. Continues to be asymptomatic for any chest pain headache dizziness numbness or tingling the upper or lower extremities. No nausea vomiting diarrhea. No changes in appetite.   Denying any increased stress in his life or salt intake    ROS:  General:  No fevers, no chills   Cardiovascular:  No chest pain, no palpitations  Respiratory:  No shortness of breath, no cough, no wheezing  Gastrointestinal:  No pain, no nausea, no vomiting, no diarrhea  Musculoskeletal:  No muscle pain, no joint pain  Skin:  No rash, no pruritis, no easy bruising  Neurologic:  No speech problems, no headache, no extremity numbness, no extremity tingling, no extremity weakness  Psychiatric:  No anxiety  Genitourinary:  No dysuria, no hematuria  Extremities:  No edema    Past Medical History:   Diagnosis Date    Arthritis     CAD (coronary artery disease)     Hyperlipidemia Hypertension     S/P ablation of atrial fibrillation 01/21/2021    Atrial fibrillation and atrial flutter ablation by Dr. Anisha Hargrove. Past Surgical History:   Procedure Laterality Date    COLONOSCOPY  2/4/13    Diverticulosis    CYST REMOVAL      baker cyst on right knee    HERNIA REPAIR      PTCA       No family history on file. Social History     Socioeconomic History    Marital status:      Spouse name: Not on file    Number of children: Not on file    Years of education: Not on file    Highest education level: Not on file   Occupational History    Not on file   Tobacco Use    Smoking status: Never    Smokeless tobacco: Never   Vaping Use    Vaping Use: Not on file   Substance and Sexual Activity    Alcohol use: Yes     Comment: occas    Drug use: No     Comment: 2 cups of coffee daily     Sexual activity: Not Currently     Partners: Female   Other Topics Concern    Not on file   Social History Narrative    Not on file     Social Determinants of Health     Financial Resource Strain: Not on file   Food Insecurity: Not on file   Transportation Needs: Not on file   Physical Activity: Not on file   Stress: Not on file   Social Connections: Not on file   Intimate Partner Violence: Not on file   Housing Stability: Not on file     No current facility-administered medications for this encounter.      Current Outpatient Medications   Medication Sig Dispense Refill    losartan (COZAAR) 25 MG tablet TAKE 1 TABLET BY MOUTH EVERY DAY (Patient not taking: Reported on 11/5/2022)      montelukast (SINGULAIR) 10 MG tablet TAKE 1 TABLET BY MOUTH EVERY DAY 90 tablet 1    AMLODIPINE BENZOATE PO Take by mouth      ELIQUIS 5 MG TABS tablet TAKE 1 TABLET BY MOUTH TWICE A DAY 60 tablet 5    sotalol (BETAPACE) 80 MG tablet Take 0.5 tablets by mouth 2 times daily 60 tablet 0    rosuvastatin (CRESTOR) 20 MG tablet Take 20 mg by mouth nightly      albuterol sulfate  (90 Base) MCG/ACT inhaler Inhale 2 puffs into the lungs every 6 hours as needed for Wheezing 1 Inhaler 5     No Known Allergies    Nursing Notes Reviewed  PHYSICAL EXAM    VITAL SIGNS: BP (!) 156/107   Pulse 63   Temp 97.8 °F (36.6 °C) (Oral)   Resp 16   Ht 5' 10\" (1.778 m)   Wt 170 lb (77.1 kg)   SpO2 98%   BMI 24.39 kg/m²    Constitutional:  Well developed, Well nourished, In no acute distress  Head:  Normocephalic, Atraumatic  Eyes:  LATANYA. Sclera clear. Conjunctiva normal, No discharge. Neck/Lymphatics: Supple, no JVD,    Cardiovascular:  RRR, Normal S1 & S2  No murmurs/gallops/rubs  Peripheral Vascular: Distal pulses 2+, Capillary refill <2seconds  Respiratory:  Respirations nonnlabored, Clear to auscultation bilaterally, No retractions  Abdomen: Bowel sounds normal in all quadrants, Soft, Non tender/Nondistended, No palpable abdominal masses. Musculoskeletal: BUE/BLE symmetrical without atrophy or deformities  Integument:  Warm, Dry, Intact, Skin turgor and texture normal  Neurologic:  Alert & oriented x3 , No focal deficits noted. Cranial nerves II through XII grossly intact. No slurred speech. No facial droop. Normal gross motor coordination & motor strength bilateral upper and lower extremities. Psychiatric:  Affect appropriate      I have reviewed and interpreted all of the currently available lab results from this visit (if applicable):  No results found for this visit on 11/05/22. Radiographs (if obtained):  [] The following radiograph was interpreted by myself in the absence of a radiologist:   [] Radiologist's Report Reviewed:  No orders to display         EKG Interpretation  Please see ED physician's note - Dr. Nabeel Cartagena - for EKG interpretation      Chart review shows recent radiographs:  No results found. ED COURSE & MEDICAL DECISION MAKING       Vital signs and nursing notes reviewed during ED course. I have independently evaluated this patient .  Supervising physician - Dr. Nabeel Cartagena - was present in ED and available for consultation throughout entirety of patient's care. All pertinent Lab data and radiographic results reviewed with patient at bedside. The patient and/or the family were informed of the results of any tests/labs/imaging, the treatment plan, and time was allotted to answer questions. Clinical Impression:  1. Asymptomatic hypertension        Patient presents  with concern for hypertension following recent medication change. On exam, pleasant nontoxic 53-year-old male, sitting upright in bed, no acute distress. BP noted 160/83, remaining vitals within normal limits. Currently asymptomatic for any chest pain or shortness of breath complaints. Patient has a nonfocal in exam today. Lungs are clear auscultation. No significant pitting edema in the lower legs. No acute ischemic changes on EKG. At this time, suspect patient's elevated blood pressures likely secondary to recent medication change/removal of his losartan. He is otherwise asymptomatic for any acute complaints concern for chest/MI, CVA/TIA or other acute hypertensive emergency/crisis that requires further work-up or rapid blood pressure lowering at this time. Discussed restarting his losartan at half the amount, 12.5 mg once daily through the weekend and to call PCP on Monday to discuss his medication regimen. Should continue taking his blood pressures regularly and keeping a log. Educated on other lifestyle diet modifications to blood pressure including DASH diet. Patient is discharged at this time stable condition with the above follow-up with PCP. Return warnings back to the ED discussed    Diagnosis and plan discussed in detail with patient who understands and agrees. Patient agrees to return emergency department if symptoms worsen or any new symptoms develop. Disposition referral (if applicable):   Doretha Uribe MD  [address]  [address] [place] [address]     Schedule an appointment as soon as possible for a visit in 2 days      Disposition medications (if applicable):  New Prescriptions    No medications on file         (Please note that portions of this note may have been completed with a voice recognition program. Efforts were made to edit the dictations but occasionally words are mis-transcribed.)          Hilary Butler PA-C  11/05/22 [id number]

## 2022-11-05 NOTE — DISCHARGE INSTRUCTIONS
Restart your Losartan medication, half a pill (12.5mg) once daily and call your PCP on Monday for recheck

## 2022-11-08 LAB
EKG ATRIAL RATE: 65 BPM
EKG DIAGNOSIS: NORMAL
EKG P AXIS: 18 DEGREES
EKG P-R INTERVAL: 134 MS
EKG Q-T INTERVAL: 418 MS
EKG QRS DURATION: 86 MS
EKG QTC CALCULATION (BAZETT): 434 MS
EKG R AXIS: -10 DEGREES
EKG T AXIS: -44 DEGREES
EKG VENTRICULAR RATE: 65 BPM

## 2022-11-08 PROCEDURE — 93010 ELECTROCARDIOGRAM REPORT: CPT | Performed by: INTERNAL MEDICINE

## 2022-11-21 ENCOUNTER — OFFICE VISIT (OUTPATIENT)
Dept: NEUROLOGY | Age: 82
End: 2022-11-21
Payer: MEDICARE

## 2022-11-21 VITALS
OXYGEN SATURATION: 99 % | BODY MASS INDEX: 24.34 KG/M2 | SYSTOLIC BLOOD PRESSURE: 136 MMHG | HEIGHT: 70 IN | HEART RATE: 65 BPM | DIASTOLIC BLOOD PRESSURE: 76 MMHG | WEIGHT: 170 LBS

## 2022-11-21 DIAGNOSIS — G47.33 OSA ON CPAP: ICD-10-CM

## 2022-11-21 DIAGNOSIS — Z99.89 OSA ON CPAP: ICD-10-CM

## 2022-11-21 DIAGNOSIS — E87.8 DISEQUILIBRIUM SYNDROME: ICD-10-CM

## 2022-11-21 DIAGNOSIS — I48.91 ATRIAL FIBRILLATION, UNSPECIFIED TYPE (HCC): ICD-10-CM

## 2022-11-21 DIAGNOSIS — G60.9 IDIOPATHIC PERIPHERAL NEUROPATHY: Primary | ICD-10-CM

## 2022-11-21 PROCEDURE — 1123F ACP DISCUSS/DSCN MKR DOCD: CPT | Performed by: NURSE PRACTITIONER

## 2022-11-21 PROCEDURE — 99214 OFFICE O/P EST MOD 30 MIN: CPT | Performed by: NURSE PRACTITIONER

## 2022-11-21 NOTE — PROGRESS NOTES
11/21/22    Candance Blare  1940    Chief Complaint   Patient presents with    Peripheral Neuropathy     Pt presents for ble neuropathy f/u, pt states no improvement, pt states they are the same as before        History of Present Illness  Eliel Harvey is a [de-identified] y.o. male presenting today for follow-up of:  balance issues, headache. Rebekah Anderson has had much improvement in his symptoms from his initial visit. His headaches and eye pressure have subsided. He still feels off balance however he tells me therapy has been very beneficial. He will continue to follow up with ophthalmology. 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 He remains on CPAP for sleep apnea. He remains on Crestor and Eliquis for management of Afib and stroke prevention. He states he gets pressure in his bilateral temples and pressure behind his eyes. He states his ophthalmologist could not find any abnormalities on his eye exam. He will get stabbing pain behind his eye that feels like a \"brain freeze\" that lasts less than a minute. He states it is random when it happens, sometimes it is once per week. It can happen in either eye but not at the same time. He tells me he thinks the pressure in his temples is related to his labile blood pressure. He was recently in the ED for hypertension, has followed up with his PCP last week, was referred to nephrology. He denies vision changes, autonomic symptoms. He states his vision affects his balance, he completed vestibular therapy twice and states this last time it wasn't as helpful. He feels off balance, especially in the dark. He denies falls. He has numbness in his bilateral feet, denies neuropathic pain.        Current Outpatient Medications Medication Sig Dispense Refill    losartan (COZAAR) 25 MG tablet Take 12.5 mg by mouth daily      montelukast (SINGULAIR) 10 MG tablet TAKE 1 TABLET BY MOUTH EVERY DAY 90 tablet 1    AMLODIPINE BENZOATE PO Take by mouth      ELIQUIS 5 MG TABS tablet TAKE 1 TABLET BY MOUTH TWICE A DAY 60 tablet 5    sotalol (BETAPACE) 80 MG tablet Take 0.5 tablets by mouth 2 times daily 60 tablet 0    rosuvastatin (CRESTOR) 20 MG tablet Take 20 mg by mouth nightly      albuterol sulfate  (90 Base) MCG/ACT inhaler Inhale 2 puffs into the lungs every 6 hours as needed for Wheezing 1 Inhaler 5     No current facility-administered medications for this visit.        Physical Exam:  Mental Status              Orientation: oriented to person, oriented to place, oriented to problem, and oriented to time                        Mood/affectappropriate mood and appropriate affect              Memory/Other: recent memory intact, remote memory intact, fund of knowledge intact, attention span normal, and concentration normal  Language  Language: (normal) language, no dysarthria, (normal) articulation, and no dysphasia/aphasia  Cranial Nerves              Eyes: pupils normal size and reactive to light and visual fields appear full              CN III, IV, VI : extraocular muscle strength normal, normal pursuit, no nystagmus, and no ptosis              Facial Motor: normal facial motor              CN XII: tongue protrudes midline  Motor/Coordination Exam              Power: motor strength appears intact throughout, no arm drift, and normal tone              Coordination: normal finger-to-nose, forearm rotation intact, and rapid alternating movement normal  Gait and Stance               Gait/Posture: station normal, ambulates independently, Romberg's test normal, wide based stance and wide-based gait  Sensory              Sensation: normal light touch, normal pinprick, normal vibration, no neglect and decreased vibration lowers Severe with vibration loss up to the knees bilaterally    /76 (Site: Left Upper Arm, Position: Sitting, Cuff Size: Large Adult)   Pulse 65   Ht 5' 10\" (1.778 m)   Wt 170 lb (77.1 kg)   SpO2 99%   BMI 24.39 kg/m²     Assessment and Plan   Diagnosis Orders   1. Idiopathic peripheral neuropathy        2. Disequilibrium syndrome        3. Atrial fibrillation, unspecified type ([address]        4. LUCILLE on CPAP          Candi Pruitt was seen in neurological follow up in regards to idiopathic peripheral neuropathy, headaches. We discussed his EMG findings of peripheral neuropathy and he understands that his feeling of ataxia is likely secondary to his neuropathy. He has not had any recent falls and declines balance therapy at this time. He denies neuropathic pain. In regards to his headaches, he states they are not bothersome as they do not last long. He does not want any medication for his headaches, he will let me know if the frequency and intensity worsens and I would plan on initiating Gabapentin 100 mg 2-3 times daily. Dr Angelito Chambers recommended avoiding TCA's for migraine prevention therapy. We discussed the importance of keeping a detailed headache diary. We discussed trying to identify headache triggers. We discussed the importance of staying well hydrated, eating three regular meals each day, getting plenty of hours of fitful sleep, and reducing stress to help prevent headaches. Return in about 6 months (around 5/21/2023).     TYRA Almendarez - CNP

## 2022-11-21 NOTE — PATIENT INSTRUCTIONS
Please contact our office around 3/20/2023 to get your follow up appointment made. Our scheduling phone number is [phone]. Thank you!

## 2022-11-22 ENCOUNTER — HOSPITAL ENCOUNTER (OUTPATIENT)
Dept: CT IMAGING | Age: 82
Discharge: HOME | End: 2022-11-22
Payer: MEDICARE

## 2022-11-22 DIAGNOSIS — M89.9 BONE DISEASE: ICD-10-CM

## 2022-11-22 PROCEDURE — 72192 CT PELVIS W/O DYE: CPT

## 2022-12-07 PROBLEM — G62.9 NEUROPATHY: Status: ACTIVE | Noted: 2022-12-07

## 2022-12-07 PROBLEM — I10 PRIMARY HYPERTENSION: Status: ACTIVE | Noted: 2022-12-07

## 2022-12-07 PROBLEM — F41.9 ANXIETY: Status: ACTIVE | Noted: 2022-12-07

## 2022-12-07 PROBLEM — N17.0 ACUTE RENAL FAILURE WITH TUBULAR NECROSIS (HCC): Status: ACTIVE | Noted: 2022-12-07

## 2023-01-09 ENCOUNTER — HOSPITAL ENCOUNTER (OUTPATIENT)
Age: 83
Discharge: HOME | End: 2023-01-09
Payer: MEDICARE

## 2023-01-09 DIAGNOSIS — G62.9 NEUROPATHY: ICD-10-CM

## 2023-01-09 DIAGNOSIS — N17.0 ACUTE RENAL FAILURE WITH TUBULAR NECROSIS (HCC): ICD-10-CM

## 2023-01-09 DIAGNOSIS — F41.9 ANXIETY: ICD-10-CM

## 2023-01-09 DIAGNOSIS — Z86.79 S/P ABLATION OF ATRIAL FIBRILLATION: ICD-10-CM

## 2023-01-09 DIAGNOSIS — Z98.890 S/P ABLATION OF ATRIAL FIBRILLATION: ICD-10-CM

## 2023-01-09 DIAGNOSIS — I10 PRIMARY HYPERTENSION: ICD-10-CM

## 2023-01-09 LAB
ALBUMIN SERPL-MCNC: 4.5 GM/DL (ref 3.4–5)
ANION GAP SERPL CALCULATED.3IONS-SCNC: 12 MMOL/L (ref 4–16)
BASOPHILS ABSOLUTE: 0 K/CU MM
BASOPHILS RELATIVE PERCENT: 0.5 % (ref 0–1)
BILIRUBIN URINE: NEGATIVE MG/DL
BLOOD, URINE: NEGATIVE
BUN BLDV-MCNC: 28 MG/DL (ref 6–23)
CALCIUM SERPL-MCNC: 9.2 MG/DL (ref 8.3–10.6)
CHLORIDE BLD-SCNC: 103 MMOL/L (ref 99–110)
CLARITY: CLEAR
CO2: 25 MMOL/L (ref 21–32)
COLOR: YELLOW
COMMENT UA: NORMAL
CREAT SERPL-MCNC: 1.4 MG/DL (ref 0.9–1.3)
CREATININE URINE: 37.5 MG/DL (ref 39–259)
DIFFERENTIAL TYPE: ABNORMAL
EOSINOPHILS ABSOLUTE: 0.2 K/CU MM
EOSINOPHILS RELATIVE PERCENT: 4.3 % (ref 0–3)
GFR SERPL CREATININE-BSD FRML MDRD: 51 ML/MIN/1.73M2
GLUCOSE BLD-MCNC: 88 MG/DL (ref 70–99)
GLUCOSE, URINE: NEGATIVE MG/DL
HCT VFR BLD CALC: 44.6 % (ref 42–52)
HEMOGLOBIN: 14.3 GM/DL (ref 13.5–18)
IMMATURE NEUTROPHIL %: 0.2 % (ref 0–0.43)
KETONES, URINE: NEGATIVE MG/DL
LEUKOCYTE ESTERASE, URINE: NEGATIVE
LYMPHOCYTES ABSOLUTE: 1.4 K/CU MM
LYMPHOCYTES RELATIVE PERCENT: 24.6 % (ref 24–44)
MCH RBC QN AUTO: 29.8 PG (ref 27–31)
MCHC RBC AUTO-ENTMCNC: 32.1 % (ref 32–36)
MCV RBC AUTO: 92.9 FL (ref 78–100)
MONOCYTES ABSOLUTE: 0.6 K/CU MM
MONOCYTES RELATIVE PERCENT: 10.3 % (ref 0–4)
NITRITE URINE, QUANTITATIVE: NEGATIVE
NUCLEATED RBC %: 0 %
PDW BLD-RTO: 12.5 % (ref 11.7–14.9)
PH, URINE: 6 (ref 5–8)
PHOSPHORUS: 3.8 MG/DL (ref 2.5–4.9)
PLATELET # BLD: 143 K/CU MM (ref 140–440)
PMV BLD AUTO: 11 FL (ref 7.5–11.1)
POTASSIUM SERPL-SCNC: 5.1 MMOL/L (ref 3.5–5.1)
PROT/CREAT RATIO, UR: 0.2
PROTEIN UA: NEGATIVE MG/DL
RBC # BLD: 4.8 M/CU MM (ref 4.6–6.2)
SEGMENTED NEUTROPHILS ABSOLUTE COUNT: 3.4 K/CU MM
SEGMENTED NEUTROPHILS RELATIVE PERCENT: 60.1 % (ref 36–66)
SODIUM BLD-SCNC: 140 MMOL/L (ref 135–145)
SPECIFIC GRAVITY UA: 1.01 (ref 1–1.03)
TOTAL IMMATURE NEUTOROPHIL: 0.01 K/CU MM
TOTAL NUCLEATED RBC: 0 K/CU MM
TSH HIGH SENSITIVITY: 1.82 UIU/ML (ref 0.27–4.2)
URINE TOTAL PROTEIN: 7.1 MG/DL
UROBILINOGEN, URINE: 0.2 MG/DL (ref 0.2–1)
WBC # BLD: 5.6 K/CU MM (ref 4–10.5)

## 2023-01-09 PROCEDURE — 84100 ASSAY OF PHOSPHORUS: CPT

## 2023-01-09 PROCEDURE — 86160 COMPLEMENT ANTIGEN: CPT

## 2023-01-09 PROCEDURE — 81003 URINALYSIS AUTO W/O SCOPE: CPT

## 2023-01-09 PROCEDURE — 84443 ASSAY THYROID STIM HORMONE: CPT

## 2023-01-09 PROCEDURE — 36415 COLL VENOUS BLD VENIPUNCTURE: CPT

## 2023-01-09 PROCEDURE — 82040 ASSAY OF SERUM ALBUMIN: CPT

## 2023-01-09 PROCEDURE — 80048 BASIC METABOLIC PNL TOTAL CA: CPT

## 2023-01-09 PROCEDURE — 84155 ASSAY OF PROTEIN SERUM: CPT

## 2023-01-09 PROCEDURE — 85025 COMPLETE CBC W/AUTO DIFF WBC: CPT

## 2023-01-09 PROCEDURE — 84165 PROTEIN E-PHORESIS SERUM: CPT

## 2023-01-09 PROCEDURE — 82570 ASSAY OF URINE CREATININE: CPT

## 2023-01-09 PROCEDURE — 84156 ASSAY OF PROTEIN URINE: CPT

## 2023-01-11 LAB
COMPLEMENT C3: 136 MG/DL (ref 90–180)
COMPLEMENT C4: 25 MG/DL (ref 10–40)

## 2023-01-13 LAB
ALBUMIN ELP: 3.8 GM/DL (ref 3.2–5.6)
ALPHA-1-GLOBULIN: 0.2 GM/DL (ref 0.1–0.4)
ALPHA-2-GLOBULIN: 0.7 GM/DL (ref 0.4–1.2)
BETA GLOBULIN: 0.9 GM/DL (ref 0.5–1.3)
GAMMA GLOBULIN: 1.1 GM/DL (ref 0.5–1.6)
SPEP INTERPRETATION: NORMAL
TOTAL PROTEIN: 6.7 GM/DL (ref 6.4–8.2)

## 2023-01-18 PROBLEM — N18.31 STAGE 3A CHRONIC KIDNEY DISEASE (HCC): Status: ACTIVE | Noted: 2023-01-18

## 2023-02-08 ENCOUNTER — APPOINTMENT (OUTPATIENT)
Dept: CT IMAGING | Age: 83
End: 2023-02-08
Payer: MEDICARE

## 2023-02-08 ENCOUNTER — HOSPITAL ENCOUNTER (EMERGENCY)
Age: 83
Discharge: HOME | End: 2023-02-09
Attending: EMERGENCY MEDICINE
Payer: MEDICARE

## 2023-02-08 ENCOUNTER — APPOINTMENT (OUTPATIENT)
Dept: GENERAL RADIOLOGY | Age: 83
End: 2023-02-08
Payer: MEDICARE

## 2023-02-08 DIAGNOSIS — R27.0 ATAXIA: ICD-10-CM

## 2023-02-08 DIAGNOSIS — E87.1 HYPONATREMIA: ICD-10-CM

## 2023-02-08 DIAGNOSIS — J32.0 CHRONIC MAXILLARY SINUSITIS: ICD-10-CM

## 2023-02-08 DIAGNOSIS — I16.0 HYPERTENSIVE URGENCY: Primary | ICD-10-CM

## 2023-02-08 LAB
ALBUMIN SERPL-MCNC: 4.2 GM/DL (ref 3.4–5)
ALP BLD-CCNC: 73 IU/L (ref 40–128)
ALT SERPL-CCNC: 22 U/L (ref 10–40)
ANION GAP SERPL CALCULATED.3IONS-SCNC: 11 MMOL/L (ref 4–16)
ANION GAP SERPL CALCULATED.3IONS-SCNC: 7 MMOL/L (ref 4–16)
AST SERPL-CCNC: 28 IU/L (ref 15–37)
BASOPHILS ABSOLUTE: 0 K/CU MM
BASOPHILS RELATIVE PERCENT: 0.4 % (ref 0–1)
BILIRUB SERPL-MCNC: 0.7 MG/DL (ref 0–1)
BILIRUBIN URINE: NEGATIVE MG/DL
BLOOD, URINE: NEGATIVE
BUN SERPL-MCNC: 22 MG/DL (ref 6–23)
BUN SERPL-MCNC: 24 MG/DL (ref 6–23)
CALCIUM SERPL-MCNC: 8.9 MG/DL (ref 8.3–10.6)
CALCIUM SERPL-MCNC: 9.1 MG/DL (ref 8.3–10.6)
CHLORIDE BLD-SCNC: 94 MMOL/L (ref 99–110)
CHLORIDE BLD-SCNC: 95 MMOL/L (ref 99–110)
CLARITY: CLEAR
CO2: 24 MMOL/L (ref 21–32)
CO2: 25 MMOL/L (ref 21–32)
COLOR: YELLOW
COMMENT UA: NORMAL
CREAT SERPL-MCNC: 1.3 MG/DL (ref 0.9–1.3)
CREAT SERPL-MCNC: 1.4 MG/DL (ref 0.9–1.3)
DIFFERENTIAL TYPE: ABNORMAL
EOSINOPHILS ABSOLUTE: 0.1 K/CU MM
EOSINOPHILS RELATIVE PERCENT: 2.3 % (ref 0–3)
GFR SERPL CREATININE-BSD FRML MDRD: 51 ML/MIN/1.73M2
GFR SERPL CREATININE-BSD FRML MDRD: 56 ML/MIN/1.73M2
GLUCOSE BLD-MCNC: 94 MG/DL (ref 70–99)
GLUCOSE SERPL-MCNC: 88 MG/DL (ref 70–99)
GLUCOSE SERPL-MCNC: 92 MG/DL (ref 70–99)
GLUCOSE, URINE: NEGATIVE MG/DL
HCT VFR BLD CALC: 41.9 % (ref 42–52)
HEMOGLOBIN: 13.9 GM/DL (ref 13.5–18)
IMMATURE NEUTROPHIL %: 0.2 % (ref 0–0.43)
KETONES, URINE: NEGATIVE MG/DL
LEUKOCYTE ESTERASE, URINE: NEGATIVE
LYMPHOCYTES ABSOLUTE: 1.2 K/CU MM
LYMPHOCYTES RELATIVE PERCENT: 22 % (ref 24–44)
MAGNESIUM: 2.3 MG/DL (ref 1.8–2.4)
MCH RBC QN AUTO: 29.9 PG (ref 27–31)
MCHC RBC AUTO-ENTMCNC: 33.2 % (ref 32–36)
MCV RBC AUTO: 90.1 FL (ref 78–100)
MONOCYTES ABSOLUTE: 0.6 K/CU MM
MONOCYTES RELATIVE PERCENT: 11.9 % (ref 0–4)
NITRITE URINE, QUANTITATIVE: NEGATIVE
NUCLEATED RBC %: 0 %
PDW BLD-RTO: 12.2 % (ref 11.7–14.9)
PH, URINE: 7 (ref 5–8)
PLATELET # BLD: 157 K/CU MM (ref 140–440)
PMV BLD AUTO: 10.2 FL (ref 7.5–11.1)
POTASSIUM SERPL-SCNC: 4.5 MMOL/L (ref 3.5–5.1)
POTASSIUM SERPL-SCNC: 5.1 MMOL/L (ref 3.5–5.1)
PROTEIN UA: NEGATIVE MG/DL
RAPID INFLUENZA  B AGN: NEGATIVE
RAPID INFLUENZA A AGN: NEGATIVE
RBC # BLD: 4.65 M/CU MM (ref 4.6–6.2)
SARS-COV-2 RDRP RESP QL NAA+PROBE: NOT DETECTED
SEGMENTED NEUTROPHILS ABSOLUTE COUNT: 3.4 K/CU MM
SEGMENTED NEUTROPHILS RELATIVE PERCENT: 63.2 % (ref 36–66)
SODIUM BLD-SCNC: 127 MMOL/L (ref 135–145)
SODIUM BLD-SCNC: 129 MMOL/L (ref 135–145)
SOURCE: NORMAL
SPECIFIC GRAVITY UA: <1.005 (ref 1–1.03)
TOTAL IMMATURE NEUTOROPHIL: 0.01 K/CU MM
TOTAL NUCLEATED RBC: 0 K/CU MM
TOTAL PROTEIN: 6.8 GM/DL (ref 6.4–8.2)
TROPONIN T: <0.01 NG/ML
UROBILINOGEN, URINE: 0.2 MG/DL (ref 0.2–1)
WBC # BLD: 5.3 K/CU MM (ref 4–10.5)

## 2023-02-08 PROCEDURE — 70450 CT HEAD/BRAIN W/O DYE: CPT

## 2023-02-08 PROCEDURE — 83735 ASSAY OF MAGNESIUM: CPT

## 2023-02-08 PROCEDURE — 71046 X-RAY EXAM CHEST 2 VIEWS: CPT

## 2023-02-08 PROCEDURE — 87635 SARS-COV-2 COVID-19 AMP PRB: CPT

## 2023-02-08 PROCEDURE — 93005 ELECTROCARDIOGRAM TRACING: CPT | Performed by: EMERGENCY MEDICINE

## 2023-02-08 PROCEDURE — 85025 COMPLETE CBC W/AUTO DIFF WBC: CPT

## 2023-02-08 PROCEDURE — 84484 ASSAY OF TROPONIN QUANT: CPT

## 2023-02-08 PROCEDURE — 2580000003 HC RX 258: Performed by: EMERGENCY MEDICINE

## 2023-02-08 PROCEDURE — 87804 INFLUENZA ASSAY W/OPTIC: CPT

## 2023-02-08 PROCEDURE — 80048 BASIC METABOLIC PNL TOTAL CA: CPT

## 2023-02-08 PROCEDURE — 70496 CT ANGIOGRAPHY HEAD: CPT

## 2023-02-08 PROCEDURE — 99285 EMERGENCY DEPT VISIT HI MDM: CPT | Performed by: EMERGENCY MEDICINE

## 2023-02-08 PROCEDURE — 6360000004 HC RX CONTRAST MEDICATION: Performed by: EMERGENCY MEDICINE

## 2023-02-08 PROCEDURE — 81003 URINALYSIS AUTO W/O SCOPE: CPT

## 2023-02-08 PROCEDURE — 80053 COMPREHEN METABOLIC PANEL: CPT

## 2023-02-08 PROCEDURE — 82962 GLUCOSE BLOOD TEST: CPT

## 2023-02-08 RX ORDER — 0.9 % SODIUM CHLORIDE 0.9 %
1000 INTRAVENOUS SOLUTION INTRAVENOUS ONCE
Status: COMPLETED | OUTPATIENT
Start: 2023-02-08 | End: 2023-02-08

## 2023-02-08 RX ORDER — 0.9 % SODIUM CHLORIDE 0.9 %
1000 INTRAVENOUS SOLUTION INTRAVENOUS ONCE
Status: DISCONTINUED | OUTPATIENT
Start: 2023-02-08 | End: 2023-02-09 | Stop reason: HOSPADM

## 2023-02-08 RX ORDER — OXYMETAZOLINE HYDROCHLORIDE 0.05 G/100ML
2 SPRAY NASAL 2 TIMES DAILY PRN
Qty: 15 ML | Refills: 0 | Status: ON HOLD | OUTPATIENT
Start: 2023-02-08 | End: 2023-02-12 | Stop reason: HOSPADM

## 2023-02-08 RX ORDER — AZITHROMYCIN 250 MG/1
250 TABLET, FILM COATED ORAL SEE ADMIN INSTRUCTIONS
Qty: 6 TABLET | Refills: 0 | Status: SHIPPED | OUTPATIENT
Start: 2023-02-08 | End: 2023-02-13

## 2023-02-08 RX ADMIN — SODIUM CHLORIDE 1000 ML: 9 INJECTION, SOLUTION INTRAVENOUS at 20:21

## 2023-02-08 RX ADMIN — IOPAMIDOL 75 ML: 755 INJECTION, SOLUTION INTRAVENOUS at 22:12

## 2023-02-09 VITALS
HEART RATE: 65 BPM | DIASTOLIC BLOOD PRESSURE: 77 MMHG | HEIGHT: 70 IN | TEMPERATURE: 97.6 F | OXYGEN SATURATION: 97 % | WEIGHT: 162 LBS | SYSTOLIC BLOOD PRESSURE: 123 MMHG | RESPIRATION RATE: 13 BRPM | BODY MASS INDEX: 23.19 KG/M2

## 2023-02-09 LAB
ANION GAP SERPL CALCULATED.3IONS-SCNC: 9 MMOL/L (ref 4–16)
BUN SERPL-MCNC: 22 MG/DL (ref 6–23)
CALCIUM SERPL-MCNC: 8.6 MG/DL (ref 8.3–10.6)
CHLORIDE BLD-SCNC: 105 MMOL/L (ref 99–110)
CO2: 23 MMOL/L (ref 21–32)
CREAT SERPL-MCNC: 1.4 MG/DL (ref 0.9–1.3)
EKG ATRIAL RATE: 61 BPM
EKG DIAGNOSIS: NORMAL
EKG Q-T INTERVAL: 412 MS
EKG QRS DURATION: 88 MS
EKG QTC CALCULATION (BAZETT): 421 MS
EKG R AXIS: -6 DEGREES
EKG T AXIS: -4 DEGREES
EKG VENTRICULAR RATE: 63 BPM
GFR SERPL CREATININE-BSD FRML MDRD: 51 ML/MIN/1.73M2
GLUCOSE SERPL-MCNC: 82 MG/DL (ref 70–99)
POTASSIUM SERPL-SCNC: 4.8 MMOL/L (ref 3.5–5.1)
SODIUM BLD-SCNC: 137 MMOL/L (ref 135–145)

## 2023-02-09 PROCEDURE — 80048 BASIC METABOLIC PNL TOTAL CA: CPT

## 2023-02-09 PROCEDURE — 93010 ELECTROCARDIOGRAM REPORT: CPT | Performed by: INTERNAL MEDICINE

## 2023-02-09 NOTE — ED NOTES
Pt ambulated well with no symptoms of dizziness or discomfort. Dr. Juan Mcknight notified.       Drea Amaral RN  02/08/23 [id number]

## 2023-02-09 NOTE — ED PROVIDER NOTES
CARE RECEIVED FROM: Dr. Kita Agarwal  I reviewed the herrera elements of the history, physical exam and initial treatment plan at the bedside. ANCILLARY DATA:  I reviewed the images. Radiologist interpretation:   CTA HEAD NECK W CONTRAST   Final Result   1. No significant stenosis or occlusion of the cervical vasculature. 2. No large vessel occlusion intracranially. RECOMMENDATIONS:   Unavailable         CT Head W/O Contrast   Final Result   No acute intracranial abnormality. Air-fluid level in the right maxillary sinus suggests acute sinusitis. XR CHEST (2 VW)   Final Result   Stable chest x-ray. No acute disease. Labs Reviewed   CBC WITH AUTO DIFFERENTIAL - Abnormal; Notable for the following components:       Result Value    Hematocrit 41.9 (*)     Lymphocytes % 22.0 (*)     Monocytes % 11.9 (*)     All other components within normal limits   COMPREHENSIVE METABOLIC PANEL - Abnormal; Notable for the following components:    Sodium 129 (*)     Chloride 94 (*)     BUN 24 (*)     Creatinine 1.4 (*)     Est, Glom Filt Rate 51 (*)     All other components within normal limits   BASIC METABOLIC PANEL - Abnormal; Notable for the following components:    Sodium 127 (*)     Chloride 95 (*)     Est, Glom Filt Rate 56 (*)     All other components within normal limits   BASIC METABOLIC PANEL - Abnormal; Notable for the following components:    Creatinine 1.4 (*)     Est, Glom Filt Rate 51 (*)     All other components within normal limits   COVID-19, RAPID   RAPID INFLUENZA A/B ANTIGENS   MAGNESIUM   TROPONIN   URINALYSIS   POCT GLUCOSE     MEDICAL DECISION MAKING / PLAN:  This is an [de-identified] yr old male with history of coronary artery disease, hypertension, hyperlipidemia, chronic kidney disease who presented to the emergency department complaining of feeling off balance.   He also complained of feeling cold with a temperature measured at home of 92.0 °F.  He had work-up here including CT of the head and CTA of the head and neck which were reassuring. He was able to ambulate in the emergency department without difficulty. His BMP showed stable chronic kidney disease but also demonstrated hyponatremia with a sodium of 129. His EKG was without evidence of dysrhythmia and troponin was nonelevated. At time of signout fluid bolus have been ordered for the patient as well as repeat sodium with plan for discharge home. In speaking with the patient after signout he tells me that he has been feeling off balance for a few years. He states the primary reason he came in tonight was that he was feeling cold and measured a temperature 92.0 °F.  He is not hypothermic here with temperature of 97.6 °F.  A repeat MP was collected showing a sodium of 127 but it seems the patient had received less than 200 cc of fluid on this repeat. Therefore, we will allow the fluids to infuse it and then recheck his BMP. Patient's repeat BMP after receiving IV fluids shows sodium of 137 and repeat neurological exam he remains nonfocal.  At this time I feel he is appropriate with outpatient follow-up with neurology. He voiced understanding. Provided with outpatient follow-up resource information for neurology. Return precautions provided. FINAL IMPRESSION:  1. Hypertensive urgency    2. Chronic maxillary sinusitis    3. Hyponatremia    4. Ataxia      ? Electronically signed by:  Chago Hearn DO, 2/9/2023        Chago Hearn DO  02/09/23 2127

## 2023-02-10 ENCOUNTER — HOSPITAL ENCOUNTER (OUTPATIENT)
Age: 83
Setting detail: OBSERVATION
Discharge: HOME | End: 2023-02-12
Attending: EMERGENCY MEDICINE
Payer: MEDICARE

## 2023-02-10 DIAGNOSIS — R27.0 ATAXIA: ICD-10-CM

## 2023-02-10 DIAGNOSIS — R47.1 DYSARTHRIA: Primary | ICD-10-CM

## 2023-02-10 PROBLEM — R42 DIZZINESS: Status: ACTIVE | Noted: 2023-02-10

## 2023-02-10 LAB
ALBUMIN SERPL-MCNC: 3.9 GM/DL (ref 3.4–5)
ALP BLD-CCNC: 71 IU/L (ref 40–129)
ALT SERPL-CCNC: 21 U/L (ref 10–40)
ANION GAP SERPL CALCULATED.3IONS-SCNC: 9 MMOL/L (ref 4–16)
AST SERPL-CCNC: 28 IU/L (ref 15–37)
BILIRUB SERPL-MCNC: 0.5 MG/DL (ref 0–1)
BILIRUBIN URINE: NEGATIVE MG/DL
BLOOD, URINE: NEGATIVE
BUN SERPL-MCNC: 22 MG/DL (ref 6–23)
CALCIUM SERPL-MCNC: 8.7 MG/DL (ref 8.3–10.6)
CHLORIDE BLD-SCNC: 99 MMOL/L (ref 99–110)
CLARITY: CLEAR
CO2: 21 MMOL/L (ref 21–32)
COLOR: YELLOW
COMMENT UA: NORMAL
CREAT SERPL-MCNC: 1.4 MG/DL (ref 0.9–1.3)
EKG ATRIAL RATE: 56 BPM
EKG DIAGNOSIS: NORMAL
EKG P AXIS: 33 DEGREES
EKG P-R INTERVAL: 148 MS
EKG Q-T INTERVAL: 452 MS
EKG QRS DURATION: 90 MS
EKG QTC CALCULATION (BAZETT): 436 MS
EKG R AXIS: -24 DEGREES
EKG T AXIS: -54 DEGREES
EKG VENTRICULAR RATE: 56 BPM
GFR SERPL CREATININE-BSD FRML MDRD: 51 ML/MIN/1.73M2
GLUCOSE SERPL-MCNC: 123 MG/DL (ref 70–99)
GLUCOSE, URINE: NEGATIVE MG/DL
HCT VFR BLD CALC: 39.3 % (ref 42–52)
HEMOGLOBIN: 13 GM/DL (ref 13.5–18)
KETONES, URINE: NEGATIVE MG/DL
LEUKOCYTE ESTERASE, URINE: NEGATIVE
MAGNESIUM: 2.3 MG/DL (ref 1.8–2.4)
MCH RBC QN AUTO: 30.2 PG (ref 27–31)
MCHC RBC AUTO-ENTMCNC: 33.1 % (ref 32–36)
MCV RBC AUTO: 91.2 FL (ref 78–100)
NITRITE URINE, QUANTITATIVE: NEGATIVE
OSMOLALITY UR: 153 MOS/L (ref 292–1090)
OSMOLALITY UR: 286 MOS/L (ref 280–300)
PDW BLD-RTO: 12.1 % (ref 11.7–14.9)
PH, URINE: 7 (ref 5–8)
PLATELET # BLD: 169 K/CU MM (ref 140–440)
PMV BLD AUTO: 10.3 FL (ref 7.5–11.1)
POTASSIUM SERPL-SCNC: 4.8 MMOL/L (ref 3.5–5.1)
PROTEIN UA: NEGATIVE MG/DL
RBC # BLD: 4.31 M/CU MM (ref 4.6–6.2)
SODIUM BLD-SCNC: 129 MMOL/L (ref 135–145)
SODIUM URINE: 32 MMOL/L (ref 35–167)
SPECIFIC GRAVITY UA: <1.005 (ref 1–1.03)
TOTAL PROTEIN: 6.6 GM/DL (ref 6.4–8.2)
TROPONIN T: <0.01 NG/ML
TROPONIN T: <0.01 NG/ML
TSH SERPL DL<=0.005 MIU/L-ACNC: 1.49 UIU/ML (ref 0.27–4.2)
UROBILINOGEN, URINE: 0.2 MG/DL (ref 0.2–1)
WBC # BLD: 5.8 K/CU MM (ref 4–10.5)

## 2023-02-10 PROCEDURE — 80053 COMPREHEN METABOLIC PANEL: CPT

## 2023-02-10 PROCEDURE — 83735 ASSAY OF MAGNESIUM: CPT

## 2023-02-10 PROCEDURE — G0378 HOSPITAL OBSERVATION PER HR: HCPCS

## 2023-02-10 PROCEDURE — 84443 ASSAY THYROID STIM HORMONE: CPT

## 2023-02-10 PROCEDURE — 83935 ASSAY OF URINE OSMOLALITY: CPT

## 2023-02-10 PROCEDURE — 84484 ASSAY OF TROPONIN QUANT: CPT

## 2023-02-10 PROCEDURE — 84300 ASSAY OF URINE SODIUM: CPT

## 2023-02-10 PROCEDURE — 93005 ELECTROCARDIOGRAM TRACING: CPT | Performed by: EMERGENCY MEDICINE

## 2023-02-10 PROCEDURE — 36415 COLL VENOUS BLD VENIPUNCTURE: CPT

## 2023-02-10 PROCEDURE — 6370000000 HC RX 637 (ALT 250 FOR IP): Performed by: NURSE PRACTITIONER

## 2023-02-10 PROCEDURE — 93010 ELECTROCARDIOGRAM REPORT: CPT | Performed by: INTERNAL MEDICINE

## 2023-02-10 PROCEDURE — 99285 EMERGENCY DEPT VISIT HI MDM: CPT

## 2023-02-10 PROCEDURE — 96361 HYDRATE IV INFUSION ADD-ON: CPT

## 2023-02-10 PROCEDURE — 85027 COMPLETE CBC AUTOMATED: CPT

## 2023-02-10 PROCEDURE — 2580000003 HC RX 258: Performed by: NURSE PRACTITIONER

## 2023-02-10 PROCEDURE — 96360 HYDRATION IV INFUSION INIT: CPT

## 2023-02-10 PROCEDURE — 81003 URINALYSIS AUTO W/O SCOPE: CPT

## 2023-02-10 PROCEDURE — 83930 ASSAY OF BLOOD OSMOLALITY: CPT

## 2023-02-10 RX ORDER — SOTALOL HYDROCHLORIDE 80 MG/1
40 TABLET ORAL 2 TIMES DAILY
Status: DISCONTINUED | OUTPATIENT
Start: 2023-02-10 | End: 2023-02-12 | Stop reason: HOSPADM

## 2023-02-10 RX ORDER — BUPROPION HYDROCHLORIDE 150 MG/1
150 TABLET, EXTENDED RELEASE ORAL DAILY
Status: DISCONTINUED | OUTPATIENT
Start: 2023-02-11 | End: 2023-02-12 | Stop reason: HOSPADM

## 2023-02-10 RX ORDER — MECLIZINE HCL 12.5 MG/1
25 TABLET ORAL 3 TIMES DAILY PRN
Status: DISCONTINUED | OUTPATIENT
Start: 2023-02-10 | End: 2023-02-12 | Stop reason: HOSPADM

## 2023-02-10 RX ORDER — ONDANSETRON 4 MG/1
4 TABLET, ORALLY DISINTEGRATING ORAL EVERY 8 HOURS PRN
Status: DISCONTINUED | OUTPATIENT
Start: 2023-02-10 | End: 2023-02-12 | Stop reason: HOSPADM

## 2023-02-10 RX ORDER — ASPIRIN 81 MG/1
81 TABLET ORAL DAILY
Status: DISCONTINUED | OUTPATIENT
Start: 2023-02-10 | End: 2023-02-12 | Stop reason: HOSPADM

## 2023-02-10 RX ORDER — ASPIRIN 300 MG/1
300 SUPPOSITORY RECTAL DAILY
Status: DISCONTINUED | OUTPATIENT
Start: 2023-02-10 | End: 2023-02-12 | Stop reason: HOSPADM

## 2023-02-10 RX ORDER — SODIUM CHLORIDE 9 MG/ML
INJECTION, SOLUTION INTRAVENOUS CONTINUOUS
Status: DISCONTINUED | OUTPATIENT
Start: 2023-02-10 | End: 2023-02-11

## 2023-02-10 RX ORDER — SPIRONOLACTONE 50 MG/1
25 TABLET, FILM COATED ORAL DAILY
Status: DISCONTINUED | OUTPATIENT
Start: 2023-02-11 | End: 2023-02-11

## 2023-02-10 RX ORDER — ONDANSETRON 2 MG/ML
4 INJECTION INTRAMUSCULAR; INTRAVENOUS EVERY 6 HOURS PRN
Status: DISCONTINUED | OUTPATIENT
Start: 2023-02-10 | End: 2023-02-12 | Stop reason: HOSPADM

## 2023-02-10 RX ORDER — LABETALOL HYDROCHLORIDE 5 MG/ML
10 INJECTION, SOLUTION INTRAVENOUS EVERY 10 MIN PRN
Status: DISCONTINUED | OUTPATIENT
Start: 2023-02-10 | End: 2023-02-12 | Stop reason: HOSPADM

## 2023-02-10 RX ORDER — ROSUVASTATIN CALCIUM 20 MG/1
20 TABLET, COATED ORAL NIGHTLY
Status: DISCONTINUED | OUTPATIENT
Start: 2023-02-10 | End: 2023-02-12 | Stop reason: HOSPADM

## 2023-02-10 RX ORDER — CITALOPRAM 20 MG/1
10 TABLET ORAL DAILY
Status: DISCONTINUED | OUTPATIENT
Start: 2023-02-11 | End: 2023-02-12 | Stop reason: HOSPADM

## 2023-02-10 RX ORDER — POLYETHYLENE GLYCOL 3350 17 G/17G
17 POWDER, FOR SOLUTION ORAL DAILY PRN
Status: DISCONTINUED | OUTPATIENT
Start: 2023-02-10 | End: 2023-02-12 | Stop reason: HOSPADM

## 2023-02-10 RX ORDER — CLONIDINE HYDROCHLORIDE 0.1 MG/1
0.1 TABLET ORAL DAILY
Status: DISCONTINUED | OUTPATIENT
Start: 2023-02-11 | End: 2023-02-11

## 2023-02-10 RX ADMIN — SODIUM CHLORIDE: 9 INJECTION, SOLUTION INTRAVENOUS at 21:26

## 2023-02-10 RX ADMIN — ASPIRIN 81 MG: 81 TABLET, COATED ORAL at 21:23

## 2023-02-10 RX ADMIN — APIXABAN 5 MG: 5 TABLET, FILM COATED ORAL at 21:23

## 2023-02-10 RX ADMIN — SOTALOL HYDROCHLORIDE 40 MG: 80 TABLET ORAL at 21:23

## 2023-02-10 RX ADMIN — ROSUVASTATIN CALCIUM 20 MG: 20 TABLET, COATED ORAL at 21:23

## 2023-02-10 ASSESSMENT — PAIN SCALES - GENERAL: PAINLEVEL_OUTOF10: 0

## 2023-02-10 ASSESSMENT — ENCOUNTER SYMPTOMS
NAUSEA: 1
VOMITING: 0
ABDOMINAL PAIN: 0
SHORTNESS OF BREATH: 0
COUGH: 0
SINUS PRESSURE: 1

## 2023-02-10 NOTE — ED NOTES
ED TO INPATIENT SBAR HANDOFF    Patient Name: Ana Lilia Goel   :  1940  [de-identified] y.o. MRN:  [id number]  Preferred Name  [facility]  ED Room #:  ED03/ED-03  Family/Caregiver Present no   Restraints no   Sitter no   Sepsis Risk Score Sepsis Risk Score: 3.49    Situation  Code Status: Prior No additional code details. Allergies: Patient has no known allergies. Weight: Patient Vitals for the past 96 hrs (Last 3 readings):   Weight   02/10/23 1304 162 lb (73.5 kg)     Arrived from: home  Chief Complaint:   Chief Complaint   Patient presents with    Gait Problem     States when he got out of bed this am, he was off balance     Altered Mental Status     Pt reports feeling like he's got 'brain fog'; seen yesterday for same but reports it is worse today    Aphasia     New onset as of this morning, resolved prior to arrival but states this has never happened this morning      Hospital Problem/Diagnosis:  Active Problems:    * No active hospital problems. *  Resolved Problems:    * No resolved hospital problems. *    Imaging:   No orders to display     Abnormal labs:   Abnormal Labs Reviewed   CBC - Abnormal; Notable for the following components:       Result Value    RBC 4.31 (*)     Hemoglobin 13.0 (*)     Hematocrit 39.3 (*)     All other components within normal limits   COMPREHENSIVE METABOLIC PANEL - Abnormal; Notable for the following components:    Sodium 129 (*)     Creatinine 1.4 (*)     Est, Glom Filt Rate 51 (*)     Glucose 123 (*)     All other components within normal limits     Critical values: no     Abnormal Assessment Findings: n/a    Background  History:   Past Medical History:   Diagnosis Date    Arthritis     CAD (coronary artery disease)     Hyperlipidemia     Hypertension     S/P ablation of atrial fibrillation 2021    Atrial fibrillation and atrial flutter ablation by Dr. Paradise Castro.        Assessment    Vitals/MEWS: MEWS Score: 0  Level of Consciousness: Alert (0)   Vitals:    02/10/23 1516 02/10/23 1532 02/10/23 1632 02/10/23 1732   BP: (!) 145/84 (!) 166/84 (!) 155/95 (!) 170/93   Pulse: 61 57 60 63   Resp: 22 10  12   Temp:    (!) 96.4 °F (35.8 °C)   TempSrc:       SpO2: 100% 99% 99% 100%   Weight:       Height:         FiO2 (%): n/a  O2 Flow Rate: O2 Device: None (Room air)    Cardiac Rhythm:   Pain Assessment:  [x] Verbal [] Verla Abby Scale  Pain Scale:    Last documented pain score (0-10 scale)    Last documented pain medication administered: none  Mental Status: oriented, alert, coherent, logical, thought processes intact, and able to concentrate and follow conversation  NIH Score: NIH NIH Stroke Scale  Interval: Baseline  Level of Consciousness (1a): Alert  LOC Questions (1b): Answers both correctly  LOC Commands (1c): Performs both tasks correctly  Best Gaze (2): Normal  Visual (3): No visual loss  Facial Palsy (4): Normal symmetrical movement  Motor Arm, Left (5a): No drift  Motor Arm, Right (5b): No drift  Motor Leg, Left (6a): No drift  Motor Leg, Right (6b): No drift  Limb Ataxia (7): Absent  Sensory (8): Normal  Best Language (9): No aphasia  Dysarthria (10): Normal  Extinction and Inattention (11): No abnormality  Total: 0   C-SSRS:    Bedside swallow:    Marian Coma Scale (GCS): [place] Coma Scale  Eye Opening: Spontaneous  Best Verbal Response: Oriented  Best Motor Response: Obeys commands  [place] Coma Scale Score: 15  Active LDA's:   Peripheral IV 02/10/23 Left Wrist (Active)     PO Status: regular  Pertinent or High Risk Medications/Drips: no   If Yes, please provide details: n/a  Pending Blood Product Administration: no     You may also review the ED PT Care Timeline found under the Summary Nursing Index tab. Recommendation    Pending orders admit orders  Plan for Discharge (if known): Additional Comments: pt from home. Pt independent at baseline. Pt able to use urinal and take meds whole.     If any further questions, please call Sending RN at [phone]    Electronically signed by: Electronically signed by Cindy Salazar RN on 2/10/2023 at 5:37 PM      Cindy Salazar, [facility]  02/10/23 [id number]

## 2023-02-10 NOTE — ED PROVIDER NOTES
Triage Chief Complaint:    Gait Problem (States when he got out of bed this am, he was off balance ), Altered Mental Status (Pt reports feeling like he's got 'brain fog'; seen yesterday for same but reports it is worse today), and Aphasia (New onset as of this morning, resolved prior to arrival but states this has never happened this morning )    HPI   Charles Banks is a [de-identified] y.o. male that presents for evaluation of unsteadiness. Patient states he has been feeling unsteady and having dysarthria for the last day. Was seen yesterday for this and states that he did not have dysarthria at that time but he did have the unsteady gait. Ghislaine Walls this is not normal for him is never experienced this in the past.  He does state that he has some peripheral neuropathy. He does report that yesterday when he was seen was told that it might be neurologic and was told to follow-up with Dr. Francisco White but has not had a chance to call him yet. Today he could not even drive himself so he had called the medics to come pick him up. Denies any headache. He has not noticed any neck pain. No chest pain or shortness of breath. Denies any recent cough or congestion. History from : Patient    Limitations to history : None    ROS:  10 systems reviewed and negative except as above. Past Medical History:   Diagnosis Date    Arthritis     CAD (coronary artery disease)     CKD (chronic kidney disease), stage III ([address]     Hyperlipidemia     Hypertension     S/P ablation of atrial fibrillation 01/21/2021    Atrial fibrillation and atrial flutter ablation by Dr. Bradshaw Daughters.      Past Surgical History:   Procedure Laterality Date    COLONOSCOPY  2/4/13    Diverticulosis    CYST REMOVAL      baker cyst on right knee    HERNIA REPAIR      PTCA       Family History   Problem Relation Age of Onset    Hypertension Mother     Cancer Sister      Social History     Socioeconomic History    Marital status:      Spouse name: Not on file    Number of children: Not on file    Years of education: Not on file    Highest education level: Not on file   Occupational History    Not on file   Tobacco Use    Smoking status: Never    Smokeless tobacco: Never   Vaping Use    Vaping Use: Not on file   Substance and Sexual Activity    Alcohol use: Yes     Comment: occas    Drug use: No     Comment: 2 cups of coffee daily     Sexual activity: Not Currently     Partners: Female   Other Topics Concern    Not on file   Social History Narrative    Not on file     Social Determinants of Health     Financial Resource Strain: Not on file   Food Insecurity: Not on file   Transportation Needs: Not on file   Physical Activity: Not on file   Stress: Not on file   Social Connections: Not on file   Intimate Partner Violence: Not on file   Housing Stability: Not on file     Current Facility-Administered Medications   Medication Dose Route Frequency Provider Last Rate Last Admin    ondansetron (ZOFRAN-ODT) disintegrating tablet 4 mg  4 mg Oral Q8H PRN TYRA Bess CNP        Or    ondansetron (ZOFRAN) injection 4 mg  4 mg IntraVENous Q6H PRN TYRA Bess CNP        polyethylene glycol (GLYCOLAX) packet 17 g  17 g Oral Daily PRN TYRA Bess CNP        aspirin EC tablet 81 mg  81 mg Oral Daily TYRA Bess CNP        Or    aspirin suppository 300 mg  300 mg Rectal Daily TYRA Bess CNP        labetalol (NORMODYNE;TRANDATE) injection 10 mg  10 mg IntraVENous Q10 Min PRN TYRA Bess CNP        [START ON 2/11/2023] buPROPion [facility]) extended release tablet 150 mg  150 mg Oral Daily TYRA Bess CNP        [START ON 2/11/2023] citalopram (CELEXA) tablet 10 mg  10 mg Oral Daily TYRA Bess CNP        [Held by provider] cloNIDine (CATAPRES) tablet 0.1 mg  0.1 mg Oral Daily TYRA Bess CNP        apixaban (ELIQUIS) tablet 5 mg  5 mg Oral BID TYRA Bess CNP        rosuvastatin (Esther Lacks) tablet 20 mg  20 mg Oral Nightly Gean Cheadle, APRN - CNP        sotalol (BETAPACE) tablet 40 mg  40 mg Oral BID Gean Cheadle, APRN - CNP        [Held by provider] spironolactone (ALDACTONE) tablet 25 mg  25 mg Oral Daily Gean Cheadle, APRN - CNP        0.9 % sodium chloride infusion   IntraVENous Continuous Milad CheMALIK crumN - CNP        meclizine (ANTIVERT) tablet 25 mg  25 mg Oral TID PRN Gean Cheadle, APRTRINO - CNP         No Known Allergies    Nursing Notes Reviewed    Physical Exam:     ED Triage Vitals   Enc Vitals Group      BP 02/10/23 1304 (!) 185/84      Heart Rate 02/10/23 1304 60      Resp 02/10/23 1304 18      Temp 02/10/23 1335 (!) 96 °F (35.6 °C)      Temp Source 02/10/23 1335 Oral      SpO2 02/10/23 1304 99 %      Weight 02/10/23 1304 162 lb (73.5 kg)      Height 02/10/23 1304 5' 10\" (1.778 m)      Head Circumference --       Peak Flow --       Pain Score --       Pain Loc --       Pain Edu? --       Excl. in [address]? --        My pulse ox interpretation is - normal    General appearance:  No acute distress. Skin:  Warm. Dry. Eye:  Extraocular movements intact. Ears, nose, mouth and throat:  Oral mucosa moist   Neck:  Trachea midline. Extremity:  No swelling. Normal ROM     Heart:  Regular rate and rhythm. Perfusion:  intact  Respiratory:  Lungs clear to auscultation bilaterally. Respirations nonlabored. Abdominal:  Normal bowel sounds. Soft. Nontender. Non distended. Back:  No CVA tenderness to palpation     Neurological:  Alert and oriented times 3.   Motor and sensory grossly intact, coordination intact          Psychiatric:  Appropriate      I have reviewed and interpreted all of the currently available lab results from this visit (if applicable):  Results for orders placed or performed during the hospital encounter of 02/10/23   CBC   Result Value Ref Range    WBC 5.8 4.0 - 10.5 K/CU MM    RBC 4.31 (L) 4.6 - 6.2 M/CU MM    Hemoglobin 13.0 (L) 13.5 - 18.0 GM/DL Hematocrit 39.3 (L) 42 - 52 %    MCV 91.2 78 - 100 FL    MCH 30.2 27 - 31 PG    MCHC 33.1 32.0 - 36.0 %    RDW 12.1 11.7 - 14.9 %    Platelets 182 456 - 076 K/CU MM    MPV 10.3 7.5 - 11.1 FL   Comprehensive Metabolic Panel   Result Value Ref Range    Sodium 129 (L) 135 - 145 MMOL/L    Potassium 4.8 3.5 - 5.1 MMOL/L    Chloride 99 99 - 110 mMol/L    CO2 21 21 - 32 MMOL/L    BUN 22 6 - 23 MG/DL    Creatinine 1.4 (H) 0.9 - 1.3 MG/DL    Est, Glom Filt Rate 51 (L) >60 mL/min/1.73m2    Glucose 123 (H) 70 - 99 MG/DL    Calcium 8.7 8.3 - 10.6 MG/DL    Albumin 3.9 3.4 - 5.0 GM/DL    Total Protein 6.6 6.4 - 8.2 GM/DL    Total Bilirubin 0.5 0.0 - 1.0 MG/DL    ALT 21 10 - 40 U/L    AST 28 15 - 37 IU/L    Alkaline Phosphatase 71 40 - 129 IU/L    Anion Gap 9 4 - 16   Troponin   Result Value Ref Range    Troponin T <0.010 <0.01 NG/ML   Troponin   Result Value Ref Range    Troponin T <0.010 <0.01 NG/ML   Magnesium   Result Value Ref Range    Magnesium 2.3 1.8 - 2.4 mg/dl   TSH   Result Value Ref Range    TSH, High Sensitivity 1.490 0.270 - 4.20 uIu/ml   Sodium, urine, random   Result Value Ref Range    Sodium, Ur 32 (L) 35 - 167 MMOL/L   Osmolality   Result Value Ref Range    Osmolality 286 280 - 300 MOS/L   Osmolality, urine   Result Value Ref Range    Osmolality, Ur 153 (L) 292 - 1090 MOS/L   EKG 12 Lead   Result Value Ref Range    Ventricular Rate 56 BPM    Atrial Rate 56 BPM    P-R Interval 148 ms    QRS Duration 90 ms    Q-T Interval 452 ms    QTc Calculation (Bazett) 436 ms    P Axis 33 degrees    R Axis -24 degrees    T Axis -54 degrees    Diagnosis       Sinus bradycardia  Low voltage QRS  Nonspecific T wave abnormality  Abnormal ECG  When compared with ECG of 08-FEB-2023 19:50,  Inverted T waves have replaced nonspecific T wave abnormality in Inferior leads  Confirmed by Leyla Escobedo ([id number]) on 2/10/2023 5:15:45 PM        Radiographs (if obtained):  [] The following radiograph was interpreted by myself in the absence of a radiologist:   [] Radiologist's Report Reviewed:  MRI brain without contrast    (Results Pending)       Procedures:  12 lead EKG per my interpretation:  12 lead EKG per my interpretation:  Normal Sinus Rhythm  Rate: 56  QTc is  normal  There are nonspecific T wave inversions in inferior leads. There are no ST wave changes appreciated. Prior EKG to compare with was available and is similar    (All EKG's are interpreted by myself in the absence of a cardiologist)      Chart review shows recent radiographs:  XR CHEST (2 VW)    Result Date: 2/8/2023  EXAMINATION: TWO XRAY VIEWS OF THE CHEST 2/8/2023 8:05 pm COMPARISON: 10/16/2021 and 01/21/2020 HISTORY: ORDERING SYSTEM PROVIDED HISTORY: Dizziness TECHNOLOGIST PROVIDED HISTORY: Reason for exam:->Dizziness Reason for Exam: Dizziness Additional signs and symptoms: none Relevant Medical/Surgical History: CAD FINDINGS: Heart size is within normal limits. Thoracic aorta is tortuous and slightly ectatic, unchanged. There is mild chronic atelectasis/scarring in the anterior clear space. Lungs are otherwise clear. No pneumothorax or pleural fluid. There are prominent nipple shadows. No acute bone finding. Thoracic spondylosis. Stable chest x-ray. No acute disease. CT Head W/O Contrast    Result Date: 2/8/2023  EXAMINATION: CT OF THE HEAD WITHOUT CONTRAST  2/8/2023 10:11 pm TECHNIQUE: CT of the head was performed without the administration of intravenous contrast. Automated exposure control, iterative reconstruction, and/or weight based adjustment of the mA/kV was utilized to reduce the radiation dose to as low as reasonably achievable. COMPARISON: 12/17/2020 HISTORY: ORDERING SYSTEM PROVIDED HISTORY: Dizziness, Afib Hx TECHNOLOGIST PROVIDED HISTORY: Reason for exam:->Dizziness, Afib Hx Has a \"code stroke\" or \"stroke alert\" been called? ->No Decision Support Exception - unselect if not a suspected or confirmed emergency medical condition->Emergency Medical Condition (MA) Reason for Exam: Dizziness, Afib Hx Additional signs and symptoms: no Relevant Medical/Surgical History: 75 ml isovue 370 used FINDINGS: BRAIN/VENTRICLES: There is no acute intracranial hemorrhage, mass effect or midline shift. No abnormal extra-axial fluid collection. The gray-white differentiation is maintained without evidence of an acute infarct. There is no evidence of hydrocephalus. ORBITS: The visualized portion of the orbits demonstrate no acute abnormality. SINUSES: There is an air-fluid level in the right maxillary sinus. Paranasal sinuses visualized are otherwise clear. Mastoids and middle ears are aerated bilaterally. SOFT TISSUES/SKULL:  No acute abnormality of the visualized skull or soft tissues. No acute intracranial abnormality. Air-fluid level in the right maxillary sinus suggests acute sinusitis. CTA HEAD NECK W CONTRAST    Result Date: 2/8/2023  EXAMINATION: CTA OF THE HEAD AND NECK WITH CONTRAST 2/8/2023 10:12 pm: TECHNIQUE: CTA of the head and neck was performed with the administration of intravenous contrast. Multiplanar reformatted images are provided for review. MIP images are provided for review. Stenosis of the internal carotid arteries measured using NASCET criteria. Automated exposure control, iterative reconstruction, and/or weight based adjustment of the mA/kV was utilized to reduce the radiation dose to as low as reasonably achievable. COMPARISON: Same-day CT head HISTORY: ORDERING SYSTEM PROVIDED HISTORY: Dizziness, Vertigo type symptoms, Afib Hx TECHNOLOGIST PROVIDED HISTORY: Risks and benefits of IV contrast in this patient with decreased renal function discussed. The patient is aware of the potential of contrast-induced nephropathy and has agreed to proceed with CT angiography with IV contrast Reason for exam:->Dizziness, Vertigo type symptoms, Afib Hx Has a \"code stroke\" or \"stroke alert\" been called? ->No Decision Support Exception - unselect if not a suspected or confirmed emergency medical condition->Emergency Medical Condition (MA) Reason for Exam: Dizziness, Afib Hx Additional signs and symptoms: no Relevant Medical/Surgical History: none FINDINGS: CTA NECK: AORTIC ARCH/ARCH VESSELS: No dissection or arterial injury. No significant stenosis of the brachiocephalic or subclavian arteries. CAROTID ARTERIES: No dissection, arterial injury, or hemodynamically significant stenosis by NASCET criteria. VERTEBRAL ARTERIES: No dissection, arterial injury, or significant stenosis. SOFT TISSUES: The lung apices are clear. No cervical or superior mediastinal lymphadenopathy. The larynx and pharynx are unremarkable. No acute abnormality of the salivary and thyroid glands. BONES: There are multilevel degenerative changes of the cervical spine. CTA HEAD: ANTERIOR CIRCULATION: No occlusion of the intracranial internal carotid, anterior cerebral, or middle cerebral arteries. Stenosis in the supraclinoid right ICA. There is also stenosis in the distal right MCA branches. No aneurysm. POSTERIOR CIRCULATION: No significant stenosis of the vertebral, basilar, or posterior cerebral arteries. No aneurysm. OTHER: No dural venous sinus thrombosis on this non-dedicated study. BRAIN: No mass effect or midline shift. No extra-axial fluid collection. The gray-white differentiation is maintained. 1. No significant stenosis or occlusion of the cervical vasculature. 2. No large vessel occlusion intracranially. RECOMMENDATIONS: Unavailable         MDM:     Discussion with Other Professionals : Admitting Team Dr. Ashley Trujillo and Consultant Dr. Katarina Leos    Social Determinants: None    Records Reviewed : Outpatient Notes she has a history of CAD and atrial fibrillation    Chronic conditions affecting care: CAD, A.  Fib    Labs ordered and results as above (interpreted by myself), concerning for hyponatremia and slight elevation in his creatinine but close to baseline for both mild anemia but again close to baseline  Given history: MRI but this can be done inpatient. Also considered repeat CT imaging but deferred for now based on clinical judgement/or after discussion with patient/patient's family. EKG interpreted by myself as above, not acutely concerning    Patient was given the following medications:  Medications   ondansetron (ZOFRAN-ODT) disintegrating tablet 4 mg (has no administration in time range)     Or   ondansetron (ZOFRAN) injection 4 mg (has no administration in time range)   polyethylene glycol (GLYCOLAX) packet 17 g (has no administration in time range)   aspirin EC tablet 81 mg (has no administration in time range)     Or   aspirin suppository 300 mg (has no administration in time range)   labetalol (NORMODYNE;TRANDATE) injection 10 mg (has no administration in time range)   buPROPion [facility]) extended release tablet 150 mg (has no administration in time range)   citalopram (CELEXA) tablet 10 mg (has no administration in time range)   cloNIDine (CATAPRES) tablet 0.1 mg ( Oral Automatically Held 2/14/23 0900)   apixaban (ELIQUIS) tablet 5 mg (has no administration in time range)   rosuvastatin (CRESTOR) tablet 20 mg (has no administration in time range)   sotalol (BETAPACE) tablet 40 mg (has no administration in time range)   spironolactone (ALDACTONE) tablet 25 mg ( Oral Automatically Held 2/14/23 0900)   0.9 % sodium chloride infusion (has no administration in time range)   meclizine (ANTIVERT) tablet 25 mg (has no administration in time range)       Disposition Discussion:  After discussion with Dr. Arianna Cheng we discussed hospitalization given the progression of symptoms now including dysarthria. Last known well is still 3 days ago. The patient be hospitalized for further care. All questions and concerns answered at this time. The patient still feels unsteady but this does not appear to be peripheral vertigo related. Patient was agreeable with the hospitalization.   Discussed the case with  team as well, Dr. Hamm who was agreeable with plan of care.    Disposition: Hospitalized     I am the primary physician of record    Clinical Impression:  1. Dysarthria    2. Ataxia      Disposition referral (if applicable):  No follow-up provider specified.  Disposition medications (if applicable):  Current Discharge Medication List          Comment: Please note this report has been produced using speech recognition software and may contain errors related to that system including errors in grammar, punctuation, and spelling, as well as words and phrases that may be inappropriate. If there are any questions or concerns please feel free to contact the dictating provider for clarification.       Vini Hackett MD  02/10/23 3710

## 2023-02-10 NOTE — H&P
V2.0  History and Physical      Name:  Caryle Almond /Age/Sex: 1940  ([de-identified] y.o. male)   MRN & CSN:  [id number] & [id number] Encounter Date/Time: 2/10/2023 6:00 PM EST   Location:  [address] [place] [address]- PCP: Hank Srivastava MD       Hospital Day: 1    Assessment and Plan:   Caryle Almond is a [de-identified] y.o. male with a past medical history of CAD, hyperlipidemia, hypertension, atrial fibrillation, CKD stage III who presents with Dizziness. Patient returned to the emergency room today with complaint of dizziness now having aphasia and difficulty ambulating. States she was seen in the emergency room 2 days ago with similar complaints found to have low sodium was given IV fluids, admission CT had no acute abnormality, CTA head and neck no stenosis or LVO noted. Patient was discharged home to follow-up with neurology as outpatient. Prior to discharge she was able to ambulate in the emergency room without difficulty. He did state that he has felt off balance for years. States this morning his symptoms have worsened states his gait was unsteady and he had difficulty ambulating and getting out of bed he felt really off balance and felt like he was spinning and made him nauseated. Dizziness: Patient was seen in the emergency room 2 days ago for similar symptoms. CTA head and neck and CT head was obtained-CTA head and neck no significant stenosis or occlusion no LVO. CT head no intracranial anomaly. Patient returns emergency room today with worsening symptoms difficulty ambulating, sensation-spinning, nausea. Denies any focal lateralizing weaknesses  -Observation  -Consult to neurology  -MRI brain without contrast  -Patient was discussed with neurology from ER we will hold off on repeat imaging as it was just done 48 hours ago.   -NIH stroke scale  -Stroke pathway ordered  -Labetalol with parameters  -Meclizine 25 mg every 8 hours for dizziness  -2D echo pending-last echo on file EF 50 to 45%, grade 2 diastolic dysfunction    Uncontrolled hypertension: Managed on clonidine as needed for SBP> 170, spironolactone  -Lipase out from as above  -Monitor blood pressure trends  -Allow for permissive hypertension    CKD stage IIIa-follows nephrology outpatient. Hypotonic hypovolemic hyponatremia: 129   Creatinine 1.4 on admission  -Consult to nephrology  -Urine studies pending, urinalysis, urine sodium and serum osmolality  -For nephrotoxic agents  -BMP daily  -Monitor sodium levels every 8 hours  -NS at 100 mL/h  - urine osmolality 151, urine sodium 32    HFpEF-last echo on file EF 50 to 55% grade 2 DD. -OP regimen includes Beta-blocker and spironolactone    Paroxysmal atrial fibrillation: S/p ablation, anticoagulated on Eliquis, rate controlled with sotalol-follows with cardiology  -Telemetry monitoring    LUCILLE on CPAP continue    Centrilobular emphysema, follows pulmonology outpatient    Stress: Celexa 10 mg daily and Wellbutrin 50 mg daily    Chronic maxillary sinusitis: Patient does endorse having facial pressure states he was ordered azithromycin when he was in the ER 2 days ago but he did not start medication. Hyperlipidemia: Continue rosuvastatin 20 mg nightly    Chronic Conditions: Continue all home medications except as stated above or contraindicated. BMI 23.24: lifestyle modifications  -Follow-up PCP for longitudinal care    This patient was seen and examined in conjunction with Dr. Florina Crowe. He was agreeable with the plan and management as dictated above. Total Time: [de-identified]    Hospital Problems             Last Modified POA    * (Principal) Dizziness 2/10/2023 Yes       Disposition:   Current Living situation: home  Expected Disposition: home  Estimated D/C: 2 days    Diet ADULT DIET;  Regular   DVT Prophylaxis [] Lovenox, []  Heparin, [] SCDs, [] Ambulation,  [x] Eliquis, [] Xarelto   Code Status Full Code   Surrogate Decision Maker/ POA spouse     History from:     patient, Quality of history:  good historian      History of Present Illness:     Chief Complaint: Dizziness  Guadalupe Eisenmenger is a [de-identified] y.o. male who presents with to the emergency room complaining of dizziness, difficulty ambulating, states feels like he has \"brain fog \". Patient states she was seen 2 days ago in the emergency room for similar symptoms but they are worse today. States he has some difficulty speaking earlier which is new this morning. Patient states this morning his gait was unsteady he does endorse having bilateral peripheral neuropathy. Patient denies any known history of diabetes mellitus. States his wife is prediabetic and she checks her blood sugar so she will use her equipment to check his in his blood sugars were normal.  He states he feels like he is spinning and the room is not spinning he feels like he is moving it makes him feel nauseated. Denies any recent falls or injuries. Denies any chest pain or palpitations no recent illnesses no fever, chills or body aches. States he was told the other day that he had sinusitis and was prescribed antibiotic but did not start medication as of yet. He states he does have a history of chronic sinusitis. Review of Systems:   Review of Systems   HENT:  Positive for congestion and sinus pressure. Respiratory:  Negative for cough and shortness of breath. Cardiovascular:  Negative for chest pain and leg swelling. Gastrointestinal:  Positive for nausea. Negative for abdominal pain and vomiting. Endocrine: Positive for polyuria. Negative for polydipsia and polyphagia. Genitourinary:  Positive for frequency. Negative for dysuria. Musculoskeletal: Negative. Skin: Negative. Neurological:  Positive for dizziness and light-headedness. Negative for weakness and numbness. Hematological:  Bruises/bleeds easily. Psychiatric/Behavioral: Negative.         Objective:   No intake or output data in the 24 hours ending 02/10/23 1907   Vitals:   Vitals:    02/10/23 1632 02/10/23 1732 02/10/23 1802 02/10/23 1838   BP: (!) 155/95 (!) 170/93 (!) 141/87 (!) 162/80   Pulse: 60 63 63 63   Resp:  12  18   Temp:  (!) 96.4 °F (35.8 °C)  97.7 °F (36.5 °C)   TempSrc:    Oral   SpO2: 99% 100% 99% 98%   Weight:       Height:           Medications Prior to Admission     Prior to Admission medications    Medication Sig Start Date End Date Taking? Authorizing Provider   azithromycin (ZITHROMAX) 250 MG tablet Take 1 tablet by mouth See Admin Instructions for 5 days 500mg on day 1 followed by 250mg on days 2 - 5 2/8/23 2/13/23  Matias Phillip MD   oxymetazoline (12 HOUR NASAL SPRAY) 0.05 % nasal spray 2 sprays by Nasal route 2 times daily as needed for Congestion 3 day maximum use.  2/8/23 2/11/23  Matias Phillip MD   buPROPion [facility] SR) 150 MG extended release tablet Take 1 tablet by mouth daily 1/20/23   Sherlon Gosselin, MD   citalopram (CELEXA) 10 MG tablet Take 1 tablet by mouth daily 1/18/23   Sherlon Gosselin, MD   spironolactone (ALDACTONE) 25 MG tablet Take 1 tablet by mouth daily 1/18/23   Bolivar Saxena MD   cloNIDine (CATAPRES) 0.1 MG tablet TAKE 1 TABLET BY MOUTH DAILY PATIENT TO TAKE IF SBP IS GREATER THAN 170 12/28/22   Bolivar Mendez MD   azelastine (ASTELIN) 0.1 % nasal spray 1 spray by Nasal route 2 times daily Use in each nostril as directed    Historical Provider, MD   fluticasone (FLONASE) 50 MCG/ACT nasal spray 1 spray by Each Nostril route 2 times daily    Historical Provider, MD   acetaminophen (TYLENOL) 325 MG tablet Take 650 mg by mouth as needed for Pain    Historical Provider, MD   Multiple Vitamins-Minerals (MULTIVITAMIN MEN 50+ PO) Take 1 tablet by mouth daily    Historical Provider, MD   APPLE CIDER VINEGAR PO Take by mouth daily Gummies    Historical Provider, MD   COLLAGEN PO Take by mouth daily Gummies    Historical Provider, MD   ELIQUIS 5 MG TABS tablet TAKE 1 TABLET BY MOUTH TWICE A DAY 10/28/21   TYRA Chahal - CNP   sotalol (Mariza Mari) 80 MG tablet Take 0.5 tablets by mouth 2 times daily 10/19/21   Smita Leonard MD   rosuvastatin (CRESTOR) 20 MG tablet Take 20 mg by mouth nightly    Historical Provider, MD       Physical Exam:       GEN Awake male, sitting upright in bed in no apparent distress. Appears given age. EYES   No scleral erythema, discharge, or conjunctivitis. HENT Mucous membranes are moist.  NECK Supple  RESP Clear to auscultation bilaterally, no wheezes, rales or rhonchi. Respirations even and unlabored on RA. CARDIO/VASC  bradycardic,  Regular rate without appreciable murmurs. No peripheral edema. GI Abdomen is soft without significant tenderness, masses, or guarding.   Dexter catheter is not present. MSK No gross joint deformities. SKIN Normal coloration, warm, dry. NEURO Cranial nerves appear grossly intact, normal speech, no lateralizing weakness. PSYCH Awake, alert, oriented x 4. Affect appropriate. Past Medical History:   PMHx   Past Medical History:   Diagnosis Date    Arthritis     CAD (coronary artery disease)     CKD (chronic kidney disease), stage III ([facility] [address]     Hyperlipidemia     Hypertension     S/P ablation of atrial fibrillation 01/21/2021    Atrial fibrillation and atrial flutter ablation by Dr. Damaris Dandy. PSHX:  has a past surgical history that includes Percutaneous Transluminal Coronary Angio; hernia repair; cyst removal; and Colonoscopy (2/4/13). Allergies: No Known Allergies  Fam HX:  family history includes Cancer in his sister; Hypertension in his mother.   Soc HX:   Social History     Socioeconomic History    Marital status:    Tobacco Use    Smoking status: Never    Smokeless tobacco: Never   Substance and Sexual Activity    Alcohol use: Yes     Comment: occas    Drug use: No     Comment: 2 cups of coffee daily     Sexual activity: Not Currently     Partners: Female       Medications:   Medications:    Infusions:   PRN Meds:     Labs    CBC:   Recent Labs     02/08/23  1950 02/10/23  1327   WBC 5.3 5.8   HGB 13.9 13.0*    169     BMP:    Recent Labs     02/08/23  2330 02/09/23  0210 02/10/23  1327   * 137 129*   K 4.5 4.8 4.8   CL 95* 105 99   CO2 25 23 21   BUN 22 22 22   CREATININE 1.3 1.4* 1.4*   GLUCOSE 92 82 123*     Hepatic:   Recent Labs     02/08/23  1950 02/10/23  1327   AST 28 28   ALT 22 21   BILITOT 0.7 0.5   ALKPHOS 73 71     Lipids:   Lab Results   Component Value Date/Time    CHOL 151 06/26/2013 07:57 AM    HDL 56 06/26/2013 07:57 AM    TRIG 143 06/26/2013 07:57 AM     Hemoglobin A1C:   Lab Results   Component Value Date/Time    LABA1C 5.3 05/19/2022 09:18 AM     TSH:   Lab Results   Component Value Date/Time    TSH 1.72 05/19/2022 09:18 AM     Troponin:   Lab Results   Component Value Date/Time    TROPONINT <0.010 02/10/2023 04:38 PM    TROPONINT <0.010 02/10/2023 01:27 PM    TROPONINT <0.010 02/08/2023 07:50 PM     Lactic Acid: No results for input(s): LACTA in the last 72 hours. BNP: No results for input(s): PROBNP in the last 72 hours.   UA:  Lab Results   Component Value Date/Time    NITRU NEGATIVE 02/08/2023 09:30 PM    COLORU YELLOW 02/08/2023 09:30 PM    WBCUA <1 05/27/2022 01:30 PM    RBCUA <1 05/27/2022 01:30 PM    MUCUS RARE 05/27/2022 01:30 PM    TRICHOMONAS NONE SEEN 05/27/2022 01:30 PM    BACTERIA NEGATIVE 05/27/2022 01:30 PM    CLARITYU CLEAR 02/08/2023 09:30 PM    SPECGRAV <1.005 02/08/2023 09:30 PM    LEUKOCYTESUR NEGATIVE 02/08/2023 09:30 PM    UROBILINOGEN 0.2 02/08/2023 09:30 PM    BILIRUBINUR NEGATIVE 02/08/2023 09:30 PM    BLOODU NEGATIVE 02/08/2023 09:30 PM    KETUA NEGATIVE 02/08/2023 09:30 PM     Urine Cultures: No results found for: [place] Rosangela  Blood Cultures: No results found for: BC  No results found for: BLOODCULT2  Organism: No results found for: ORG    Imaging/Diagnostics Last 24 Hours   XR CHEST (2 VW)    Result Date: 2/8/2023  EXAMINATION: TWO XRAY VIEWS OF THE CHEST 2/8/2023 8:05 pm COMPARISON: 10/16/2021 and 01/21/2020 HISTORY: ORDERING SYSTEM PROVIDED HISTORY: Dizziness TECHNOLOGIST PROVIDED HISTORY: Reason for exam:->Dizziness Reason for Exam: Dizziness Additional signs and symptoms: none Relevant Medical/Surgical History: CAD FINDINGS: Heart size is within normal limits. Thoracic aorta is tortuous and slightly ectatic, unchanged. There is mild chronic atelectasis/scarring in the anterior clear space. Lungs are otherwise clear. No pneumothorax or pleural fluid. There are prominent nipple shadows. No acute bone finding. Thoracic spondylosis. Stable chest x-ray. No acute disease. CT Head W/O Contrast    Result Date: 2/8/2023  EXAMINATION: CT OF THE HEAD WITHOUT CONTRAST  2/8/2023 10:11 pm TECHNIQUE: CT of the head was performed without the administration of intravenous contrast. Automated exposure control, iterative reconstruction, and/or weight based adjustment of the mA/kV was utilized to reduce the radiation dose to as low as reasonably achievable. COMPARISON: 12/17/2020 HISTORY: ORDERING SYSTEM PROVIDED HISTORY: Dizziness, Afib Hx TECHNOLOGIST PROVIDED HISTORY: Reason for exam:->Dizziness, Afib Hx Has a \"code stroke\" or \"stroke alert\" been called? ->No Decision Support Exception - unselect if not a suspected or confirmed emergency medical condition->Emergency Medical Condition (MA) Reason for Exam: Dizziness, Afib Hx Additional signs and symptoms: no Relevant Medical/Surgical History: 75 ml isovue 370 used FINDINGS: BRAIN/VENTRICLES: There is no acute intracranial hemorrhage, mass effect or midline shift. No abnormal extra-axial fluid collection. The gray-white differentiation is maintained without evidence of an acute infarct. There is no evidence of hydrocephalus. ORBITS: The visualized portion of the orbits demonstrate no acute abnormality. SINUSES: There is an air-fluid level in the right maxillary sinus. Paranasal sinuses visualized are otherwise clear. Mastoids and middle ears are aerated bilaterally. SOFT TISSUES/SKULL:  No acute abnormality of the visualized skull or soft tissues. No acute intracranial abnormality. Air-fluid level in the right maxillary sinus suggests acute sinusitis. CTA HEAD NECK W CONTRAST    Result Date: 2/8/2023  EXAMINATION: CTA OF THE HEAD AND NECK WITH CONTRAST 2/8/2023 10:12 pm: TECHNIQUE: CTA of the head and neck was performed with the administration of intravenous contrast. Multiplanar reformatted images are provided for review. MIP images are provided for review. Stenosis of the internal carotid arteries measured using NASCET criteria. Automated exposure control, iterative reconstruction, and/or weight based adjustment of the mA/kV was utilized to reduce the radiation dose to as low as reasonably achievable. COMPARISON: Same-day CT head HISTORY: ORDERING SYSTEM PROVIDED HISTORY: Dizziness, Vertigo type symptoms, Afib Hx TECHNOLOGIST PROVIDED HISTORY: Risks and benefits of IV contrast in this patient with decreased renal function discussed. The patient is aware of the potential of contrast-induced nephropathy and has agreed to proceed with CT angiography with IV contrast Reason for exam:->Dizziness, Vertigo type symptoms, Afib Hx Has a \"code stroke\" or \"stroke alert\" been called? ->No Decision Support Exception - unselect if not a suspected or confirmed emergency medical condition->Emergency Medical Condition (MA) Reason for Exam: Dizziness, Afib Hx Additional signs and symptoms: no Relevant Medical/Surgical History: none FINDINGS: CTA NECK: AORTIC ARCH/ARCH VESSELS: No dissection or arterial injury. No significant stenosis of the brachiocephalic or subclavian arteries. CAROTID ARTERIES: No dissection, arterial injury, or hemodynamically significant stenosis by NASCET criteria. VERTEBRAL ARTERIES: No dissection, arterial injury, or significant stenosis. SOFT TISSUES: The lung apices are clear. No cervical or superior mediastinal lymphadenopathy.   The larynx and pharynx are unremarkable. No acute abnormality of the salivary and thyroid glands. BONES: There are multilevel degenerative changes of the cervical spine. CTA HEAD: ANTERIOR CIRCULATION: No occlusion of the intracranial internal carotid, anterior cerebral, or middle cerebral arteries. Stenosis in the supraclinoid right ICA. There is also stenosis in the distal right MCA branches. No aneurysm. POSTERIOR CIRCULATION: No significant stenosis of the vertebral, basilar, or posterior cerebral arteries. No aneurysm. OTHER: No dural venous sinus thrombosis on this non-dedicated study. BRAIN: No mass effect or midline shift. No extra-axial fluid collection. The gray-white differentiation is maintained. 1. No significant stenosis or occlusion of the cervical vasculature. 2. No large vessel occlusion intracranially. RECOMMENDATIONS: Unavailable       I discussed this patient with the ED provider and Dr. Emilie Chahal. I did a review of patient's medical records, lab results and imaging conducted today. I personally reviewed patient's vital signs including pulse ox and EKG Sinus bradycardia HR 56 Qtc 436. Nonspecific T wave abnormality.     Electronically signed by Vista Oppenheim, APRN - CNP on 2/10/2023 at 7:07 PM

## 2023-02-10 NOTE — ED NOTES
Estefani repaged Dr Valeria Sinclair  02/10/23 [id number] Dr Chris Hughes returned call      Rosemary Hall  02/10/23 [id number]

## 2023-02-11 LAB
ANION GAP SERPL CALCULATED.3IONS-SCNC: 8 MMOL/L (ref 4–16)
BUN SERPL-MCNC: 19 MG/DL (ref 6–23)
CALCIUM SERPL-MCNC: 8.7 MG/DL (ref 8.3–10.6)
CHLORIDE BLD-SCNC: 103 MMOL/L (ref 99–110)
CHOLEST SERPL-MCNC: 126 MG/DL
CO2: 23 MMOL/L (ref 21–32)
CREAT SERPL-MCNC: 1.2 MG/DL (ref 0.9–1.3)
ERYTHROCYTE SEDIMENTATION RATE: 8 MM/HR (ref 0–20)
ESTIMATED AVERAGE GLUCOSE: 105 MG/DL
GFR SERPL CREATININE-BSD FRML MDRD: >60 ML/MIN/1.73M2
GLUCOSE BLD-MCNC: 169 MG/DL (ref 70–99)
GLUCOSE SERPL-MCNC: 105 MG/DL (ref 70–99)
HBA1C MFR BLD: 5.3 % (ref 4.2–6.3)
HCT VFR BLD CALC: 38.5 % (ref 42–52)
HDLC SERPL-MCNC: 54 MG/DL
HEMOGLOBIN: 13 GM/DL (ref 13.5–18)
HIGH SENSITIVE C-REACTIVE PROTEIN: 0.6 MG/L (ref 0–5)
LDLC SERPL CALC-MCNC: 51 MG/DL
MCH RBC QN AUTO: 30.4 PG (ref 27–31)
MCHC RBC AUTO-ENTMCNC: 33.8 % (ref 32–36)
MCV RBC AUTO: 90 FL (ref 78–100)
PDW BLD-RTO: 12.2 % (ref 11.7–14.9)
PLATELET # BLD: 153 K/CU MM (ref 140–440)
PMV BLD AUTO: 10 FL (ref 7.5–11.1)
POTASSIUM SERPL-SCNC: 5.1 MMOL/L (ref 3.5–5.1)
RBC # BLD: 4.28 M/CU MM (ref 4.6–6.2)
SODIUM BLD-SCNC: 134 MMOL/L (ref 135–145)
SODIUM BLD-SCNC: 138 MMOL/L (ref 135–145)
TRIGL SERPL-MCNC: 107 MG/DL
WBC # BLD: 5.9 K/CU MM (ref 4–10.5)

## 2023-02-11 PROCEDURE — 83036 HEMOGLOBIN GLYCOSYLATED A1C: CPT

## 2023-02-11 PROCEDURE — 92610 EVALUATE SWALLOWING FUNCTION: CPT

## 2023-02-11 PROCEDURE — 97165 OT EVAL LOW COMPLEX 30 MIN: CPT

## 2023-02-11 PROCEDURE — G0378 HOSPITAL OBSERVATION PER HR: HCPCS

## 2023-02-11 PROCEDURE — 96361 HYDRATE IV INFUSION ADD-ON: CPT

## 2023-02-11 PROCEDURE — 6370000000 HC RX 637 (ALT 250 FOR IP): Performed by: NURSE PRACTITIONER

## 2023-02-11 PROCEDURE — 36415 COLL VENOUS BLD VENIPUNCTURE: CPT

## 2023-02-11 PROCEDURE — 99205 OFFICE O/P NEW HI 60 MIN: CPT | Performed by: STUDENT IN AN ORGANIZED HEALTH CARE EDUCATION/TRAINING PROGRAM

## 2023-02-11 PROCEDURE — 94761 N-INVAS EAR/PLS OXIMETRY MLT: CPT

## 2023-02-11 PROCEDURE — 80061 LIPID PANEL: CPT

## 2023-02-11 PROCEDURE — 86141 C-REACTIVE PROTEIN HS: CPT

## 2023-02-11 PROCEDURE — 85652 RBC SED RATE AUTOMATED: CPT

## 2023-02-11 PROCEDURE — 80048 BASIC METABOLIC PNL TOTAL CA: CPT

## 2023-02-11 PROCEDURE — 85027 COMPLETE CBC AUTOMATED: CPT

## 2023-02-11 PROCEDURE — 84295 ASSAY OF SERUM SODIUM: CPT

## 2023-02-11 PROCEDURE — 82962 GLUCOSE BLOOD TEST: CPT

## 2023-02-11 PROCEDURE — 97161 PT EVAL LOW COMPLEX 20 MIN: CPT

## 2023-02-11 RX ORDER — MIDODRINE HYDROCHLORIDE 5 MG/1
5 TABLET ORAL 2 TIMES DAILY PRN
Status: DISCONTINUED | OUTPATIENT
Start: 2023-02-11 | End: 2023-02-12 | Stop reason: HOSPADM

## 2023-02-11 RX ADMIN — CITALOPRAM HYDROBROMIDE 10 MG: 20 TABLET ORAL at 09:36

## 2023-02-11 RX ADMIN — MECLIZINE 25 MG: 12.5 TABLET ORAL at 12:16

## 2023-02-11 RX ADMIN — APIXABAN 5 MG: 5 TABLET, FILM COATED ORAL at 09:37

## 2023-02-11 RX ADMIN — ROSUVASTATIN CALCIUM 20 MG: 20 TABLET, COATED ORAL at 21:27

## 2023-02-11 RX ADMIN — ASPIRIN 81 MG: 81 TABLET, COATED ORAL at 09:36

## 2023-02-11 RX ADMIN — SOTALOL HYDROCHLORIDE 40 MG: 80 TABLET ORAL at 21:27

## 2023-02-11 RX ADMIN — SOTALOL HYDROCHLORIDE 40 MG: 80 TABLET ORAL at 09:35

## 2023-02-11 RX ADMIN — APIXABAN 5 MG: 5 TABLET, FILM COATED ORAL at 21:27

## 2023-02-11 RX ADMIN — BUPROPION HYDROCHLORIDE 150 MG: 150 TABLET, EXTENDED RELEASE ORAL at 09:36

## 2023-02-11 ASSESSMENT — PAIN SCALES - GENERAL
PAINLEVEL_OUTOF10: 0

## 2023-02-11 NOTE — CONSULTS
[address], [address] [facility]                                  CONSULTATION    PATIENT NAME: Monty Barahona                     :        1940  MED REC NO:   [id number]                          ROOM:       [id number]  ACCOUNT NO:   [de-identified]                           ADMIT DATE: 02/10/2023  PROVIDER:     Mi Paulino MD    CONSULT DATE:  2023    INDICATIONS:  Dizziness. HISTORY OF PRESENT ILLNESS:  This is an 82-year-old male patient, who  came in with having symptoms since Thursday of dizziness present and his  blood pressure had been very labile also. He denies nausea. He feels  flushed, and he gets nauseated. He had an episode of dizziness and  flushing and the monitor did not show any arrhythmias and he was given  meclizine this time. He feels a little bit better. The patient was  seen by Renal also today. The patient denies any chest pain. No fevers  and no chills. No cough or sputum production. No other  or GI  complaints present. The patient was in the office not long ago. The patient had a heart catheterization done back in  and at that  time, he was found to have moderate disease noted. He was treated  medically at that time and I did a heart catheterization on him in  2020. The left main was patent. LAD stent patent. Circ had mild  disease. RCA had mild disease. Normal right heart pressure was noted. The patient was started on Toprol 25 mg a day. Verapamil in the morning  and Toprol at nighttime. The patient has a history of having paroxysmal atrial fibrillation. He  has been on anticoagulation. His blood pressure has been very labile  also. The patient was seen by Dr. Monserrat Esteban in 2021 for paroxysmal atrial  fibrillation. He underwent EP study at that time. This was done in  2021.   He had, I think, ablation done, successful isolation of  prominent veins, ablation of her proximal atrial fibrillation. Successful ablation of cavotricuspid isthmus for typical atrial flutter  also. PAST MEDICAL HISTORY:  The patient denies any history of stroke. No  seizures present. History of atrial fibrillation present and paroxysmal  atrial fibrillation. History of orthostatic blood pressures present. No diabetes present. He has a renal insufficiency present. He follows  with Dr. Tony Cagle. PAST SURGICAL HISTORY:  Hernia repair and cyst removal.    SOCIAL HISTORY:  He does not smoke. He does not drink. He has never  worked. MEDICATIONS AT HOME:  Aldactone, clonidine, Crestor, Betapace 40 mg  b.i.d., and Eliquis 5 mg b.i.d. ALLERGIES:  NKDA. PHYSICAL EXAMINATION:  GENERAL:  The patient is awake, alert, and answering questions, not in  acute distress. VITAL SIGNS:  Temperature is afebrile. Pulse is 73, sinus rhythm. Blood pressure is 107/76. HEENT:  Head is atraumatic. Pupils are equal and reactive. CHEST:  Equal expansion. LUNGS:  Clear to auscultation. No wheezing or rhonchi present. HEART:  Regular rhythm. ABDOMEN:  Soft and nontender. Bowel sounds are present. No  hepatosplenomegaly or guarding appreciated. EXTREMITIES:  No cyanosis noted. NEUROLOGIC:  Cranial nerves are grossly intact. LABORATORY DATA:  Sodium is improved to 138. Troponins are negative. LFTs are normal.  CBC is normal.    IMPRESSION AND PLAN:  This is an 82-year-old male who comes in with  having dizziness and lightheadedness present. The patient was seen by  Neurology. The patient is undergoing an MRI today. The patient had an  echo done in 10/2021. Normal LV function and last heart catheterization  with patent stents were noted. At this time, we will wait for a neuro  workup. The patient is being treated with meclizine. We will wait for  that also to see how he responds. History of paroxysmal atrial fibrillation and history of sinus rhythm.    He is on anticoagulation also.    History of orthostatic blood pressure history. The patient is  undergoing orthostatic blood pressure right now. I will review that and  we will make further recommendations. He is currently on medications. He is on Eliquis, Crestor, and low-dose  Betapace.         Karlie Nolasco MD    D: 02/11/2023 13:13:26       T: 02/11/2023 14:44:43     [id number]  Job#: [id number]     Doc#: [id number]    CC:

## 2023-02-11 NOTE — PROGRESS NOTES
V2.0  [facility] Progress Note      Name:  Charles Banks /Age/Sex: 1940  ([de-identified] y.o. male)   MRN & CSN:  [id number] & [id number] Encounter Date/Time: 2023 12:20 PM EST    Location:  [address] [place] [address]-A PCP: Skip Sever, MD       Hospital Day: 2    Assessment and Plan:   Charles Banks is a [de-identified] y.o. male who presents with dizziness      Plan:  Dizziness and dysarthria-CTA head and neck: No occlusion or stenosis. CT head: No acute process. MRI brain pending. Neurology consult. Antivert. Echo pending. Cardiology consult. May be related to elevated blood pressures. Patient may also be orthostatic. Orthostatic positive. Recheck orthostatics today. Troponins negative. On antiplatelet, statin, anticoagulation. LKW . PT/OT/SLP  Uncontrolled hypertension-on as needed labetalol. Allowing for permissive hypertension. Hyponatremia-sodium 129. Nephrology consulted. Off IVF. Improved sodium 138. Urine sodium 32. Paroxysmal A-fib-Hx ablation. On Eliquis. On sotalol. CKD 3  Diastolic CHF  Paroxysmal A-fib  LUCILLE  Hyperlipidemia      Diet ADULT DIET; Regular    DVT Prophylaxis [] Lovenox, []  Heparin, [] SCDs, [] Ambulation,  [] Eliquis, [] Xarelto  [] Coumadin   Code Status Full Code   Disposition From: Home  Expected Disposition: Home  Estimated Date of Discharge:   Patient requires continued admission due to dizziness   Home O2 None     Subjective/Interval history:   Chief Complaint: Gait Problem (States when he got out of bed this am, he was off balance ), Altered Mental Status (Pt reports feeling like he's got 'brain fog'; seen yesterday for same but reports it is worse today), and Aphasia (New onset as of this morning, resolved prior to arrival but states this has never happened this morning )     Has been feeling flush and nauseous when sitting. When standing he feels dizzy and has difficulty keeping his balance. Denies any chest pain or palpitations. Hx afib.      Review of Systems:    Negative unless mentioned above    Objective: Intake/Output Summary (Last 24 hours) at 2/11/2023 1220  Last data filed at 2/11/2023 0404  Gross per 24 hour   Intake 120 ml   Output 1200 ml   Net -1080 ml        Vitals:   /68   Pulse 73   Temp 98.5 °F (36.9 °C) (Oral)   Resp 16   Ht 5' 10\" (1.778 m)   Wt 162 lb (73.5 kg)   SpO2 97%   BMI 23.24 kg/m²     Physical Exam:   General: NAD, sitting in chair  Eyes: no discharge  HENT: NCAT  Cardiovascular: RRR  Respiratory: Clear to auscultation   Gastrointestinal: Soft, non tender, nondistended  Genitourinary: no suprapubic tenderness  Musculoskeletal: No edema, nontender  Skin: warm, dry, no gross lesions  Neuro: Alert. No gross deficits  Psych: Mood appropriate. Medications:   Medications:    aspirin  81 mg Oral Daily    Or    aspirin  300 mg Rectal Daily    buPROPion  150 mg Oral Daily    citalopram  10 mg Oral Daily    apixaban  5 mg Oral BID    rosuvastatin  20 mg Oral Nightly    sotalol  40 mg Oral BID      Infusions:   PRN Meds: midodrine, 5 mg, BID PRN  ondansetron, 4 mg, Q8H PRN   Or  ondansetron, 4 mg, Q6H PRN  polyethylene glycol, 17 g, Daily PRN  labetalol, 10 mg, Q10 Min PRN  meclizine, 25 mg, TID PRN        Labs      Recent Labs     02/08/23  1950 02/10/23  1327 02/11/23  0059   WBC 5.3 5.8 5.9   HGB 13.9 13.0* 13.0*   HCT 41.9* 39.3* 38.5*    169 153      Recent Labs     02/08/23  2330 02/09/23  0210 02/10/23  1327 02/11/23  0334   * 137 129* 138   K 4.5 4.8 4.8  --    CL 95* 105 99  --    CO2 25 23 21  --    BUN 22 22 22  --    CREATININE 1.3 1.4* 1.4*  --      Recent Labs     02/08/23  1950 02/10/23  1327   AST 28 28   ALT 22 21   BILITOT 0.7 0.5   ALKPHOS 73 71     No results for input(s): INR in the last 72 hours.   Recent Labs     02/08/23  1950 02/10/23  1327 02/10/23  1638   TROPONINT <0.010 <0.010 <0.010     Lab Results   Component Value Date/Time    LABA1C 5.3 02/11/2023 12:59 AM     CALCIUM:  8.7/21 (02/10 1327)  Lab Results   Component Value Date/Time    MG 2.3 02/10/2023 01:27 PM           Electronically signed by Antonia Mora MD on 2/11/2023 at 12:20 PM

## 2023-02-11 NOTE — CONSULTS
Nephrology Service Consultation    Patient:  Dhaval Beasley  MRN: [id number]  Consulting physician:  Maggie Salas MD  Reason for Consult: Hypertension with acute renal failure    History Obtained From: Patient  PCP: Carlie Gastelum MD    HISTORY OF PRESENT ILLNESS:   The patient is a [de-identified] y.o. male who presents with weakness fatigue some mild confusion. Also developed with some possible aphasia in the ER and states was unsteady in his gait for the last few days. He does see neurology outpatient. And presents to the hospital for evaluation work-up in the above setting the patient has a history of coronary disease and atrial fibrillation and noted to have mild hyponatremia as well. Patient known to me with underlying stage III kidney disease and a recent work-up with imaging with contrast and renal function likely increased related to that and being worked up for possible dizziness in etiology in the setting of hypertension stage III kidney disease and renal asked to evaluate his nondialysis with above findings. Active bleeding today but noted to be in the morning for details of this    Past Medical History:        Diagnosis Date    Arthritis     CAD (coronary artery disease)     CKD (chronic kidney disease), stage III ([address]     Hyperlipidemia     Hypertension     S/P ablation of atrial fibrillation 01/21/2021    Atrial fibrillation and atrial flutter ablation by Dr. Vic Sawyer.        Past Surgical History:        Procedure Laterality Date    COLONOSCOPY  2/4/13    Diverticulosis    CYST REMOVAL      baker cyst on right knee    HERNIA REPAIR      PTCA         Medications:   Scheduled Meds:   aspirin  81 mg Oral Daily    Or    aspirin  300 mg Rectal Daily    buPROPion  150 mg Oral Daily    citalopram  10 mg Oral Daily    [Held by provider] cloNIDine  0.1 mg Oral Daily    apixaban  5 mg Oral BID    rosuvastatin  20 mg Oral Nightly    sotalol  40 mg Oral BID    [Held by provider] spironolactone  25 mg Oral Daily Continuous Infusions:  PRN Meds:.ondansetron **OR** ondansetron, polyethylene glycol, labetalol, meclizine    Allergies:  Patient has no known allergies. Social History:   TOBACCO:   reports that he has never smoked. He has never used smokeless tobacco.  ETOH:   reports current alcohol use. OCCUPATION:      Family History:       Problem Relation Age of Onset    Hypertension Mother     Cancer Sister        REVIEW OF SYSTEMS:  Negative except for weak tired off balance. Physical Exam:    I/O: 02/10 0701 - 02/11 0700  In: 120 [P.O.:120]  Out: 1200 [Urine:1200]    Vitals: /68   Pulse 73   Temp 98.5 °F (36.9 °C) (Oral)   Resp 16   Ht 5' 10\" (1.778 m)   Wt 162 lb (73.5 kg)   SpO2 97%   BMI 23.24 kg/m²   General appearance: awake weak  HEENT: Head: Normal, normocephalic, atraumatic. Neck: supple, symmetrical, trachea midline  Lungs: diminished breath sounds bilaterally  Heart: S1, S2 normal  Abdomen: abnormal findings:  soft NT  Extremities: edema trace  Neurologic: Mental status: alertness: Awake weak      CBC:   Recent Labs     02/08/23  1950 02/10/23  1327 02/11/23  0059   WBC 5.3 5.8 5.9   HGB 13.9 13.0* 13.0*    169 153     BMP:    Recent Labs     02/08/23  2330 02/09/23  0210 02/10/23  1327 02/11/23  0334   * 137 129* 138   K 4.5 4.8 4.8  --    CL 95* 105 99  --    CO2 25 23 21  --    BUN 22 22 22  --    CREATININE 1.3 1.4* 1.4*  --    GLUCOSE 92 82 123*  --      Hepatic:   Recent Labs     02/08/23  1950 02/10/23  1327   AST 28 28   ALT 22 21   BILITOT 0.7 0.5   ALKPHOS 73 71     Troponin: No results for input(s): TROPONINI in the last 72 hours. BNP: No results for input(s): BNP in the last 72 hours. Lipids:   Recent Labs     02/11/23  0059   CHOL 126   HDL 54     ABGs:   Lab Results   Component Value Date/Time    PO2ART 439.0 01/21/2021 08:08 AM    NFR9MOU 37.7 01/21/2021 08:08 AM     INR: No results for input(s): INR in the last 72 hours.   Renal Labs  Albumin:    Lab Results Component Value Date/Time    LABALBU 3.9 02/10/2023 01:27 PM     Calcium:    Lab Results   Component Value Date/Time    CALCIUM 8.7 02/10/2023 01:27 PM     Phosphorus:    Lab Results   Component Value Date/Time    PHOS 3.8 01/09/2023 02:56 PM     U/A:    Lab Results   Component Value Date/Time    NITRU NEGATIVE 02/10/2023 09:30 PM    COLORU YELLOW 02/10/2023 09:30 PM    WBCUA <1 05/27/2022 01:30 PM    RBCUA <1 05/27/2022 01:30 PM    MUCUS RARE 05/27/2022 01:30 PM    TRICHOMONAS NONE SEEN 05/27/2022 01:30 PM    BACTERIA NEGATIVE 05/27/2022 01:30 PM    CLARITYU CLEAR 02/10/2023 09:30 PM    SPECGRAV <1.005 02/10/2023 09:30 PM    UROBILINOGEN 0.2 02/10/2023 09:30 PM    BILIRUBINUR NEGATIVE 02/10/2023 09:30 PM    BLOODU NEGATIVE 02/10/2023 09:30 PM    KETUA NEGATIVE 02/10/2023 09:30 PM     ABG:    Lab Results   Component Value Date/Time    UZZ0LVZ 37.7 01/21/2021 08:08 AM    PO2ART 439.0 01/21/2021 08:08 AM    SUP7PLT 24.1 01/21/2021 08:08 AM     HgBA1c:    Lab Results   Component Value Date/Time    LABA1C 5.3 02/11/2023 12:59 AM     Microalbumen/Creatinine ratio:  No components found for: RUCREAT  TSH:    Lab Results   Component Value Date/Time    TSH 1.72 05/19/2022 09:18 AM     IRON:  No results found for: IRON  Iron Saturation:  No components found for: PERCENTFE  TIBC:  No results found for: TIBC  FERRITIN:  No results found for: FERRITIN  RPR:  No results found for: RPR  ELAYNE:  No results found for: ANATITER, ELAYNE  24 Hour Urine for Creatinine Clearance:  No components found for: CREAT4, UHRS10, UTV10  -----------------------------------------------------------------  Impression   1. No significant stenosis or occlusion of the cervical vasculature. 2. No large vessel occlusion intracranially. RECOMMENDATIONS:   Unavailable     Impression   No acute intracranial abnormality. Air-fluid level in the right maxillary sinus suggests acute sinusitis.      Assessment and Recommendations     Patient Active Problem List   Diagnosis Code    [facility]-fib ([facility] I48.91    LUCILLE on CPAP G47.33, Z99.89    Atrial fibrillation with rapid ventricular response (HCC) I48.91    S/P ablation of atrial fibrillation Z98.890, Z86.79    Status post ablation of atrial flutter Z98.890, Z86.79    Erythema L53.9    Atrial fibrillation (HCC) I48.91    Primary hypertension I10    Anxiety F41.9    Neuropathy G62.9    Acute renal failure with tubular necrosis (HCC) N17.0    Stage 3a chronic kidney disease (HCC) N18.31    Dizziness R42     Impression plan  #1 dizziness  #2 hyponatremia  #3 stage III kidney disease with possible acute renal failure  4 hypertension  #5 congestive heart failure EF 50 to 73% with diastolic dysfunction  #6 positive sinusitis    Plan  #1 not currently dizzy but is lying down in bed we will monitor with neuro work-up and plan for MRI  #2 sodium is slightly low repeat sodium level is at 138 and okay to be corrected sodium is only slightly low initially will monitor for now neuro affect as well  #3 baseline creatinine about 1.3-1.4 monitor for now supportive care and hydration  #4 blood pressure low in the 100s will adjust medications at to drop blood pressure monitor for now  #5 hold diuretic doses for now not appear overloaded  #6 recent ER evaluation shows evidence of sinus infection could be a possible factor as well monitor for any signs of infection and treat for that    We will follow with supportive care agree with current medical management  Electronically signed by Shahzad Hewitt MD on 2/11/2023 at 8:58 AM

## 2023-02-11 NOTE — CONSULTS
Neurology Service Consult Note  [address]   Patient Name: Carli Ratliff  : 1940        Subjective:   Reason for consult: \"I jumped out of bed and was off balance and dizzy\"  [de-identified] y.o. right-handed male with PMH listed below presenting to [facility] with complaints of dizziness described as feeling off balance when he woke up 3 days prior to this examination. Patient reports that he has longstanding history of A-fib he had an episode several years ago where he had a syncopal episode found to be in acute A-fib and did hit his head ever since then he states that he has had dizziness and headaches that are supposed to be managed on outpatient basis by the neurological office however his history and reading the notes do not mimic what he is telling us at the bedside about his headaches and his dizziness. He tells us that he feels as if he is moving when he woke up on Thursday morning he came to the ER he had acute hyponatremia he got discharged and then decided come back on Friday because symptoms remain the same. He has a known of bilateral lower extremity peripheral neuropathy that neurology did diagnose on outpatient basis. His hyponatremia has resolved and nephrology has been following the patient, patient also reports that he has been having hypertension and he was hypertensive here in the hospital but he has not been tracking blood pressures on outpatient basis.   During my exam he tells me that his dizziness is resolved and this is not his main concern anymore he states that his main concern today is his bilateral sharp temple region pain and generalized cephalgia that patient's been suffering from however upon looking at previous notes from when he followed with neurology on outpatient basis in 2022 he told the nurse practitioner that he saw that his headaches are not bothersome for him anymore so than when we asked him if his headaches have become more bothersome and more constant he says no but however he is complaining of it now. He denies any neck pain with his symptoms. Denies any change in his vision he had denies any facial droop he does state when he got out of bed 3 days prior to examination he did have slurred speech that is now resolved. He denies any other unilateral arm or leg weakness or sensation changes. At 1 time it was considered to put the patient on gabapentin however upon looking at his prior to admission medications he is not on gabapentin, he is on a statin and he is on Eliquis he states he has not missed any doses of these medications. There is no family at the bedside. He denies chest pain shortness of breath for me at the bedside hemodynamically stable with no fever neck or back pain.       Past Medical History:   Diagnosis Date    Arthritis     CAD (coronary artery disease)     CKD (chronic kidney disease), stage III (HCC)     Hyperlipidemia     Hypertension     S/P ablation of atrial fibrillation 01/21/2021    Atrial fibrillation and atrial flutter ablation by Dr. Randalyn Scheuermann.    :   Past Surgical History:   Procedure Laterality Date    COLONOSCOPY  2/4/13    Diverticulosis    CYST REMOVAL      baker cyst on right knee    HERNIA REPAIR      PTCA       Medications:  Scheduled Meds:   aspirin  81 mg Oral Daily    Or    aspirin  300 mg Rectal Daily    buPROPion  150 mg Oral Daily    citalopram  10 mg Oral Daily    apixaban  5 mg Oral BID    rosuvastatin  20 mg Oral Nightly    sotalol  40 mg Oral BID     Continuous Infusions:  PRN Meds:.midodrine, ondansetron **OR** ondansetron, polyethylene glycol, labetalol, meclizine    No Known Allergies  Social History     Socioeconomic History    Marital status:      Spouse name: Not on file    Number of children: Not on file    Years of education: Not on file    Highest education level: Not on file   Occupational History    Not on file   Tobacco Use    Smoking status: Never    Smokeless tobacco: Never   Vaping Use    Vaping Use: Not on file   Substance and Sexual Activity    Alcohol use: Yes     Comment: occas    Drug use: No     Comment: 2 cups of coffee daily     Sexual activity: Not Currently     Partners: Female   Other Topics Concern    Not on file   Social History Narrative    Not on file     Social Determinants of Health     Financial Resource Strain: Not on file   Food Insecurity: Not on file   Transportation Needs: Not on file   Physical Activity: Not on file   Stress: Not on file   Social Connections: Not on file   Intimate Partner Violence: Not on file   Housing Stability: Not on file      Family History   Problem Relation Age of Onset    Hypertension Mother     Cancer Sister          ROS (10 systems)  In addition to that documented in the HPI above, the additional ROS was obtained:  Constitutional: Denies fevers or chills  Eyes: Denies vision changes  ENMT: Denies sore throat  CV: Denies chest pain  Resp: Denies SOB  GI: Denies vomiting or diarrhea  : Denies painful urination  MSK: Denies recent trauma  Skin: Denies new rashes  Neuro: Denies new numbness or tingling or weakness  Endocrine: Denies unexpected weight loss  Heme: Denies bleeding disorders    Physical Exam:         Wt Readings from Last 3 Encounters:   02/10/23 162 lb (73.5 kg)   02/11/23 162 lb (73.5 kg)   02/08/23 162 lb (73.5 kg)     Temp Readings from Last 3 Encounters:   02/11/23 98.5 °F (36.9 °C) (Oral)   02/08/23 97.6 °F (36.4 °C) (Oral)   01/18/23 (!) 69.9 °F (21.1 °C)     BP Readings from Last 3 Encounters:   02/11/23 107/68   02/09/23 123/77   01/18/23 (!) 152/80     Pulse Readings from Last 3 Encounters:   02/11/23 73   02/09/23 65   01/18/23 59        Gen: A&O x 4, NAD, cooperative  HEENT: NC/AT, EOMI, PERRL, mmm, no carotid bruits, neck supple, no meningeal signs;    Heart: irregular   Lungs: no distress   Ext: no edema, no calf tenderness b/l  Psych: normal mood and affect  Skin: no rashes or lesions    NEUROLOGIC EXAM:    Mental Status: A&O to self, location, month and year, NAD, speech clear, language fluent, repetition and naming intact, follows commands appropriately    Cranial Nerve Exam:   CN II-XII:  PERRL, VFF, no nystagmus, no gaze paresis, sensation V1-V3 intact b/l, muscles of facial expression symmetric; hearing intact to conversational tone, palate elevates symmetrically, shoulder elevation symmetric and tongue protrudes midline with movement side to side. Motor Exam:       Antigravity x4     Deep Tendon Reflexes: 1/4 biceps, triceps, brachioradialis, patellar, and achilles b/l; flexor plantar responses b/l    Sensation: Intact light touch/pinprick/vibration UE's/LE's b/l all decreased in stocking distribution     Coordination/Cerebellum:       Tremors--none      Rapidly alternating movements: no dysdiadochokinesia b/l                Finger-to-Nose: no dysmetria b/l    Gait and stance:      Gait: deferred      LABS:     Recent Labs     02/08/23  1950 02/08/23  2330 02/09/23  0210 02/10/23  1327 02/11/23  0059 02/11/23  0334   WBC 5.3  --   --  5.8 5.9  --    * 127* 137 129*  --  138   K 5.1 4.5 4.8 4.8  --   --    CL 94* 95* 105 99  --   --    CO2 24 25 23 21  --   --    BUN 24* 22 22 22  --   --    CREATININE 1.4* 1.3 1.4* 1.4*  --   --    GLUCOSE 88 92 82 123*  --   --            IMAGING:    MRI brain wo pending     CTA head and neck:  1. No significant stenosis or occlusion of the cervical vasculature. 2. No large vessel occlusion intracranially. RECOMMENDATIONS:   Unavailable             ASSESSMENT/PLAN:     [de-identified]year old male with reported dizziness described as being off balanced with brain fog and slurred speech secondary to known BLE PN superimposed on acute hyponatremia and orthostasis. Hx of migraines that are followed in our office, sed rate pending, not contributory to chief compliant.  Plan of care as follows:  Neuro Exam:  BLE stocking distribution sensation loss Neurodiagnostics:  MRI brain pending  Sed rate pending   Medications :  Continue home dose of Crestor and Eliquis   PT/OT/ST:  Balance therapy outpatient   Follow up:  Defer hyponatremia to nephology  Further recommendation pending MRI of brain, however if unremarkable patient is stable for discharge with follow up in our office and PCP         Thank you for allowing us to participate in the care of your patient. If there are any questions regarding evaluation please feel free to contact us. TYRA Chilel CNP, 2/11/2023     ------------------------------------    Attending Note:  I have rounded on this patient with Roger Boles CNP. I have reviewed the chart and we have discussed this case in detail. The patient was seen and examined by myself. Pertinent labs and imaging have been personally reviewed. Our findings and impressions were discussed with the patient. I concur with the Nurse Practioner's assessment and plan. Patient seen and evaluated at bedside this morning. He describes his dizziness to me as a sensation as though he is moving in space as well as a sensation of lightheadedness. He tells me that it was brought on by standing. He tells me that his dizziness is now entirely resolved. Other than slurred speech, he had no other focal neurologic deficits associated with his dizziness. He has an MRI brain pending. I do have low clinical suspicion for stroke, nonetheless, we will follow-up with MRI. He also reports chronic headache today. He tells me that it has been a dull daily headache. He was seen in the office by our nurse practitioner in November and at that point time it appears that his headaches were stable I encouraged that he follow-up with our office to discuss further headache management if this continues to be a issue for him.     Saralee Cranker, DO 2/11/2023 4:29 PM

## 2023-02-11 NOTE — PROGRESS NOTES
Occupational Therapy  [facility] ACUTE CARE OCCUPATIONAL THERAPY EVALUATION    Marc Pablo, 1940, 4004/4004-A, 2/11/2023    Discharge Recommendation: Home w/ assist prn      History:  [place]:  The primary encounter diagnosis was Dysarthria. A diagnosis of Ataxia was also pertinent to this visit. Past Medical History:   Diagnosis Date    Arthritis     CAD (coronary artery disease)     CKD (chronic kidney disease), stage III ([address]     Hyperlipidemia     Hypertension     S/P ablation of atrial fibrillation 01/21/2021    Atrial fibrillation and atrial flutter ablation by Dr. Sherrie Phillip.          Subjective:  Patient states: \"I'm a little wobbly\"  Pain:  reports pressure in forehead, denies other pain  Communication with other providers: co-eval w/ PT, handoff to RN  Restrictions: General Precautions, Fall Risk    Home Setup/Prior level of function:  Social/Functional History  Lives With: Spouse  Type of Home: House  Home Layout: One level, Laundry in basement  Home Access: Stairs to enter without rails  Entrance Stairs - Number of Steps: 2  Bathroom Shower/Tub: Shower chair with back, Walk-in shower  Bathroom Toilet: Standard  Bathroom Equipment: Grab bars in [address]: [place], [place], [place] [facility]  Has the patient had two or more falls in the past year or any fall with injury in the past year?: No  ADL Assistance: Independent  Homemaking Assistance: Independent  Ambulation Assistance: Independent  Transfer Assistance: Independent  Active : Yes  Occupation: Retired     Examination:  Observation: Supine in bed upon arrival, agreeable to therapy eval.  Vision: WNL  Hearing: [place]  Vitals: Stable vitals throughout session on room air      [address] and functions:  ROM: WFL   Strength: B UE grossly 5/5 across all major joints   Sensation: WFL  Tone: Normal  Coordination: WFL  Perception: WNL      Cognitive and Psychosocial Functioning:  Overall cognitive status: alert and oriented, WFL  Affect: Normal       Functional Mobility:  Bed mobility:  mod I  Sitting balance:  mod I    Transfers: STS from EOB w/ SBA, stand to sit to recliner w/ SBA  Standing balance:  SBA w/o AD  Functional Mobility: ambulated long functional distance w/o AD w/ SBA  Toilet/Shower Transfers: NT        Activities of Daily Living (ADLs):  Feeding: set up A  Grooming: supervision  UB bathing: supervision  LB bathing: supervision  UB dressing: supervision  LB dressing: supervision to don shoes while seated EOB  Toileting: supervision    *ADL determined per observation of functional mobility, balance, activity tolerance, cognition, or actual ADL performance. AM-PAC 6 click short form for inpatient daily activity:   How much help from another person does the patient currently need. .. Unable  Dep A Lot  Max A A Lot   Mod A A Little  Min A A Little   CGA  SBA None   Mod I  Indep  Sup   1. Putting on and taking off regular lower body clothing? [] 1    [] 2   [] 2   [] 3   [] 3   [x] 4      2. Bathing (including washing, rinsing, drying)? [] 1   [] 2   [] 2 [] 3 [] 3 [x] 4   3. Toileting, which includes using toilet, bedpan, or urinal? [] 1    [] 2   [] 2   [] 3   [] 3   [x] 4     4. Putting on and taking off regular upper body clothing? [] 1   [] 2   [] 2   [] 3   [] 3    [x] 4      5. Taking care of personal grooming such as brushing teeth? [] 1   [] 2    [] 2 [] 3    [] 3   [x] 4      6. Eating meals?    [] 1   [] 2   [] 2   [] 3   [] 3   [x] 4        Raw Score:  24     [24=0% impaired(CH), 23=1-19%(CI), 20-22=20-39%(CJ), 15-19=40-59%(CK), 10-14=60-79%(CL), 7-9=80-99%(CM), 6=100%(CN)]       Educated pt on role of OT, therapy POC and functional goals, progression w/ ADLs and transfers, importance of movement and OOB activity, d/c recommendations     Safety Measures: Gait belt used, Left in recliner, Alarm in place  Recommendations for NURSING activity:  Up to chair for all 3 meals and up to bathroom for all toileting needs Assessment:  Pt is an [de-identified] y o M admitted d/t dizziness. Pt at baseline is IND for ADLs, IND for high level IADLs, and IND for functional transfers/mobility w/o AD. Pt currently presents at/near baseline w/ ADLs and transfers/mobility. No further acute OT needs at this time. Complexity: Low  Prognosis: Good, no significant barriers to participation at this time.    Occupational Therapy Plan  Times Per Week: d/c      Time:   Time in: 1033  Time out: 1047  Total time: 14  Timed treatment minutes: 0        Electronically signed by:      Cielo Hyatt OTR/L  [id number]

## 2023-02-11 NOTE — PROGRESS NOTES
Speech Language Pathology  Facility/Department: [facility] 4E   CLINICAL BEDSIDE SWALLOW EVALUATION    NAME: Luz Marina Abarca  : 1940  MRN: [id number]    ADMISSION DATE: 2/10/2023  ADMITTING DIAGNOSIS: has A-fib ([address] LUCILLE on CPAP; Atrial fibrillation with rapid ventricular response (HCC); S/P ablation of atrial fibrillation; Status post ablation of atrial flutter; Erythema; Atrial fibrillation ([address] Primary hypertension; Anxiety; Neuropathy; Acute renal failure with tubular necrosis ([address] Stage 3a chronic kidney disease ([address] and Dizziness on their problem list.  ONSET DATE: 2/10/23    Recent Chest Xray: 23- Stable chest x-ray. No acute disease  CTA Head/neck with contrast -   1. No significant stenosis or occlusion of the cervical vasculature. 2. No large vessel occlusion intracranially. Date of Eval: 2023  Evaluating Therapist: Olesya Roa MS, CCC-SLP    Current Diet level:  Current Diet : Regular    Primary Complaint  Patient Complaint: none reported    IMPRESSIONS: Luz Marina Abarca participated in a BSE this date following admission to [facility] d/t dizziness and hypertension. Pt has a hx of a-fib, sleep apnea with CPAP, CKD. Today, he is seated upright in chair after eating 100% of lunch tray. He is alert and oriented x4. OME is WFL, hyolaryngeal elevation is age-appropriate. Pt has 3 bridges. He states he feels back to baseline. No hx of TIA, CVA, dysphagia, or speech-language concerns. Pt trials thin via cup/straw, puree, and hard solids with 0 overt s/s of aspiration across all consistencies. No reported difficulty with meals. Recommend continue regular diet/thin liquids, medications PO. No acute SLP needs identified at this time.      Pain:  Pain Assessment  Pain Assessment: None - Denies Pain  Pain Level: 0    Reason for Referral  Luz Marina Abarca was referred for a bedside swallow evaluation to assess the efficiency of his swallow function, identify signs and symptoms of aspiration and make recommendations regarding safe dietary consistencies, effective compensatory strategies, and safe eating environment. Impression  Dysphagia Diagnosis: Swallow function appears [facility]  Dysphagia Impression : [facility]  Dysphagia Outcome Severity Scale: Level 7: Normal in all situations     Treatment Plan  Requires SLP Intervention: Yes  Duration of Treatment: n/a  D/C Recommendations: No follow up therapy recommended post discharge       Recommended Diet and Intervention: regular, thin       Recommended Form of Meds: PO        Compensatory Swallowing Strategies: n/a     Treatment/Goals: n/a     General  Chart Reviewed: Yes  Behavior/Cognition: Alert; Cooperative  Temperature Spikes Noted: No  Respiratory Status: Room air  O2 Device: None (Room air)  Communication Observation: Functional  Follows Directions: Complex  Dentition: Adequate (3 bridges)  Patient Positioning: Upright in chair  Baseline Vocal Quality: Normal  Volitional Cough: Strong  Prior Dysphagia History: none reported  Consistencies Administered: Regular;Pureed; Thin - straw; Thin - cup    Vision/Hearing  Vision  Vision: Within Functional Limits  Hearing  Hearing: Exceptions to [facility]  Hearing Exceptions: Hard of hearing/hearing concerns    Oral Motor Deficits  Oral/Motor  Oral Hygiene: Moist;Clean  Gag: No Impairment    Oral Phase Dysfunction  Oral Phase  Oral Phase: WNL     Indicators of Pharyngeal Phase Dysfunction   Pharyngeal Phase   Pharyngeal Phase: WNL      Education  Patient Education: assessment procedures, results of assessment  Patient Education Response: Verbalizes understanding        [employer], [address]  2/11/2023 11:44 AM

## 2023-02-11 NOTE — CONSULTS
[address] [facility], 1940, 4004/4004-A, 2/11/2023    History  [place]:  The primary encounter diagnosis was Dysarthria. A diagnosis of Ataxia was also pertinent to this visit. Patient  has a past medical history of Arthritis, CAD (coronary artery disease), CKD (chronic kidney disease), stage III ([address] Hyperlipidemia, Hypertension, and S/P ablation of atrial fibrillation. Patient  has a past surgical history that includes Percutaneous Transluminal Coronary Angio; hernia repair; cyst removal; and Colonoscopy (2/4/13). Subjective:  Patient states: \"It actually feels good to walk. \"    Pain:  denies pain but reports pressure in forehead. Communication with other providers:  Handoff to RN, OT  Restrictions: general precautions, fall risk    Home Setup/Prior level of function  Social/Functional History  Lives With: Spouse  Type of Home: House  Home Layout: One level, Laundry in basement  Home Access: Stairs to enter without rails  Entrance Stairs - Number of Steps: 2  Bathroom Shower/Tub: Shower chair with back, Walk-in shower  Bathroom Toilet: Standard  Bathroom Equipment: Grab bars in [address] [place]: [place], [employer]  Has the patient had two or more falls in the past year or any fall with injury in the past year?: No  ADL Assistance: Independent  Homemaking Assistance: Independent  Ambulation Assistance: Independent  Transfer Assistance: Independent  Active : Yes  Occupation: Retired    Examination of body systems (includes body structures/functions, activity/participation limitations):  Observation:  Pt supine in bed upon arrival and agreeable to therapy  Vision:  WFL  Hearing:  slightly [place]  Cardiopulmonary:  no O2 needs  Cognition: WFL, see OT/SLP note for further evaluation. Musculoskeletal  ROM R/L:  WFL. Strength R/L:  5/5, minimal impairment in function and endurance.     Neuro:  numbness and tingling in bilateral LEs from knees to feet      Mobility:  Rolling L/R:  mod I  Supine to sit:  mod I  Transfers: pt completed STS from EOB and to chair mod I  Sitting balance:  good. Standing balance:  good. Gait: pt ambulated 400' without a device CGA progressing to SBA with good angel and no LOB throughout. [facility] 6 Clicks Inpatient Mobility:  AM-PAC Inpatient Mobility Raw Score : 22      Safety: patient left in chair with alarm on, call light within reach, RN notified, gait belt used. Assessment:  Pt is an [de-identified] y.o. male admitted to the hospital for dizziness. Pt is typically independent with all ambulation and transfers without a device. Pt is currently performing bed mobility mod I, transferring mod I, and ambulating 400' without a device SBA. Pt is functioning close to baseline and does not require any further acute care needs at this time. Pt will be discharged from caseload. Complexity: Low    Prognosis: Good, no significant barriers to participation at this time.      Equipment: none    Recommendations for NURSING mobility: amb with gait belt    Time:   Time in: 1033  Time out: 1047  Timed treatment minutes: 0  Total time: 14    Electronically signed by:    Sandra rCuz PT  2/11/2023, 12:20 PM

## 2023-02-12 ENCOUNTER — APPOINTMENT (OUTPATIENT)
Dept: MRI IMAGING | Age: 83
End: 2023-02-12
Payer: MEDICARE

## 2023-02-12 VITALS
TEMPERATURE: 98 F | WEIGHT: 162 LBS | BODY MASS INDEX: 23.19 KG/M2 | HEIGHT: 70 IN | OXYGEN SATURATION: 99 % | SYSTOLIC BLOOD PRESSURE: 157 MMHG | DIASTOLIC BLOOD PRESSURE: 86 MMHG | HEART RATE: 63 BPM | RESPIRATION RATE: 18 BRPM

## 2023-02-12 LAB
ALBUMIN SERPL-MCNC: 3.8 GM/DL (ref 3.4–5)
ANION GAP SERPL CALCULATED.3IONS-SCNC: 8 MMOL/L (ref 4–16)
BUN SERPL-MCNC: 18 MG/DL (ref 6–23)
CALCIUM SERPL-MCNC: 8.7 MG/DL (ref 8.3–10.6)
CHLORIDE BLD-SCNC: 104 MMOL/L (ref 99–110)
CO2: 24 MMOL/L (ref 21–32)
CREAT SERPL-MCNC: 1.4 MG/DL (ref 0.9–1.3)
GFR SERPL CREATININE-BSD FRML MDRD: 51 ML/MIN/1.73M2
GLUCOSE SERPL-MCNC: 90 MG/DL (ref 70–99)
PHOSPHORUS: 3.4 MG/DL (ref 2.5–4.9)
POTASSIUM SERPL-SCNC: 4.6 MMOL/L (ref 3.5–5.1)
SODIUM BLD-SCNC: 136 MMOL/L (ref 135–145)

## 2023-02-12 PROCEDURE — 36415 COLL VENOUS BLD VENIPUNCTURE: CPT

## 2023-02-12 PROCEDURE — 94761 N-INVAS EAR/PLS OXIMETRY MLT: CPT

## 2023-02-12 PROCEDURE — G0378 HOSPITAL OBSERVATION PER HR: HCPCS

## 2023-02-12 PROCEDURE — 70551 MRI BRAIN STEM W/O DYE: CPT

## 2023-02-12 PROCEDURE — 80069 RENAL FUNCTION PANEL: CPT

## 2023-02-12 PROCEDURE — 6370000000 HC RX 637 (ALT 250 FOR IP): Performed by: NURSE PRACTITIONER

## 2023-02-12 RX ORDER — MIDODRINE HYDROCHLORIDE 5 MG/1
5 TABLET ORAL 2 TIMES DAILY PRN
Qty: 60 TABLET | Refills: 0 | Status: SHIPPED | OUTPATIENT
Start: 2023-02-12

## 2023-02-12 RX ADMIN — ASPIRIN 81 MG: 81 TABLET, COATED ORAL at 08:09

## 2023-02-12 RX ADMIN — APIXABAN 5 MG: 5 TABLET, FILM COATED ORAL at 08:09

## 2023-02-12 RX ADMIN — CITALOPRAM HYDROBROMIDE 10 MG: 20 TABLET ORAL at 08:09

## 2023-02-12 RX ADMIN — BUPROPION HYDROCHLORIDE 150 MG: 150 TABLET, EXTENDED RELEASE ORAL at 08:09

## 2023-02-12 RX ADMIN — SOTALOL HYDROCHLORIDE 40 MG: 80 TABLET ORAL at 08:09

## 2023-02-12 NOTE — PROGRESS NOTES
Daily Progress Note  Subjective:    Pt. Awake, alert and feeling better  HR stable, NSR, BP stable  No CP, SOB, dizziness improved- orthostatics are better    Impression and Plan:     Dizziness    Neuro following and w/u pending- MRI pending read for today    Likely d/t orthostatics as this has been a chronic issue for him    Noted readings yesterday- ok maintaining in the 140-150 range    On midodrine per renal- he did have an issue tolerating this prior but seems to be tolerating well currently    Increase activity as able    Hx of PAF    S/p ablation previously    On AC and sotalol    NSR on tele-will monitor    Increase activity as able, await MRI  Will follow      Most Recent Echo  10/18/21   Summary   Left ventricular systolic function is normal.   Ejection fraction is visually estimated at 50-55%. Grade II diastolic dysfunction. Mild mitral regurgitation. No evidence of any pericardial effusion. Technically difficult study, due to body habitus. Most Recent Heart Cath  202  IMPRESSION:  1. Left main is patent. 2.  Circ has mild disease noted in OM1 and OM2 mild disease noted. 3.  LAD has a proximal stent and mid stent present, which is widely  patent. 4.  RCA has mild disease noted, is a dominant vessel. 5.  EDP was normal.  6.  Right heart pressure was normal.    [id number] Score for Atrial Fibrillation Stroke Risk   Risk   Factors  Component Value   C CHF No 0   H HTN Yes 1   A2 Age >= 76 Yes,  [de-identified] y.o.) 2   D DM No 0   S2 Prior Stroke/TIA No 0   V Vascular Disease No 0   A Age 74-69 No,  [de-identified] y.o.) 0   Sc Sex male 0    [id number]  Score  3   Score last updated 2/12/23 9:63 PM EST    Click here for a link to the UpToDate guideline \"Atrial Fibrillation: Anticoagulation therapy to prevent embolization    Disclaimer: Risk Score calculation is dependent on accuracy of patient problem list and past encounter diagnosis. PAST MEDICAL HISTORY:  The patient denies any history of stroke. No  seizures present. History of atrial fibrillation present and paroxysmal  atrial fibrillation. History of orthostatic blood pressures present. No diabetes present. He has a renal insufficiency present. He follows  with Dr. Ingrid Lake. PAST SURGICAL HISTORY:  Hernia repair and cyst removal.     SOCIAL HISTORY:  He does not smoke. He does not drink. He has never  worked. MEDICATIONS AT HOME:  Aldactone, clonidine, Crestor, Betapace 40 mg  b.i.d., and Eliquis 5 mg b.i.d.  Objective:   /80   Pulse 67   Temp 98 °F (36.7 °C) (Oral)   Resp 18   Ht 5' 10\" (1.778 m)   Wt 162 lb (73.5 kg)   SpO2 95%   BMI 23.24 kg/m²     Intake/Output Summary (Last 24 hours) at 2/12/2023 1301  Last data filed at 2/12/2023 1254  Gross per 24 hour   Intake 360 ml   Output 550 ml   Net -190 ml       Medications:   Scheduled Meds:   aspirin  81 mg Oral Daily    Or    aspirin  300 mg Rectal Daily    buPROPion  150 mg Oral Daily    citalopram  10 mg Oral Daily    apixaban  5 mg Oral BID    rosuvastatin  20 mg Oral Nightly    sotalol  40 mg Oral BID      Infusions:     PRN Meds:  midodrine, ondansetron **OR** ondansetron, polyethylene glycol, labetalol, meclizine     Physical Exam:  Vitals:    02/12/23 0825   BP:    Pulse: 67   Resp:    Temp:    SpO2: 95%        General: AAO, NAD  Chest: Nontender  Cardiac: First and Second Heart Sounds are Normal, No Murmurs or Gallops noted  Lungs:Clear to auscultation and percussion. Abdomen: Soft, NT, ND, +BS  Extremities: No clubbing, no edema  Vascular:  Equal 2+ peripheral pulses.     Lab Data:  CBC:   Recent Labs     02/10/23  1327 02/11/23  0059   WBC 5.8 5.9   HGB 13.0* 13.0*   HCT 39.3* 38.5*   MCV 91.2 90.0    153     BMP:   Recent Labs     02/10/23  1327 02/11/23  0334 02/11/23  1618 02/12/23  0105   * 138 134* 136   K 4.8  --  5.1 4.6   CL 99  --  103 104   CO2 21  --  23 24   PHOS  --   --   --  3.4   BUN 22  --  19 18   CREATININE 1.4*  --  1.2 1.4* LIVER PROFILE:   Recent Labs     02/10/23  1327   AST 28   ALT 21   BILITOT 0.5   ALKPHOS 71     PT/INR: No results for input(s): PROTIME, INR in the last 72 hours. APTT: No results for input(s): APTT in the last 72 hours. BNP:  No results for input(s): BNP in the last 72 hours.       Assessment:  Patient Active Problem List    Diagnosis Date Noted    Dizziness 02/10/2023    Stage 3a chronic kidney disease ([facility] 01/18/2023    Primary hypertension 12/07/2022    Anxiety 12/07/2022    Neuropathy 12/07/2022    Acute renal failure with tubular necrosis ([address] 12/07/2022    Atrial fibrillation ([address] 10/17/2021    Status post ablation of atrial flutter 07/27/2021    Erythema 07/27/2021    S/P ablation of atrial fibrillation 01/21/2021    Atrial fibrillation with rapid ventricular response ([address] 12/16/2020    LUCILLE on CPAP 10/13/2020    A-fib ([facility] 03/27/2020       Electronically signed by Richard Francis PA-C on 2/12/2023 at 1:01 PM

## 2023-02-12 NOTE — PROGRESS NOTES
Patient's IV remove by this RN without difficulty. Patient educated on discharge instructions, medications, and follow-up appointments. All questions answered and issues addressed. Patient taken out for discharge by wheelchair to family with private car.

## 2023-02-12 NOTE — PROGRESS NOTES
Nephrology Progress Note  2/12/2023 10:23 AM  Subjective: Interval History: Carli Ratliff is a [de-identified] y.o. male with weak doing somewhat better less lightheaded dizzy        Data:   Scheduled Meds:   aspirin  81 mg Oral Daily    Or    aspirin  300 mg Rectal Daily    buPROPion  150 mg Oral Daily    citalopram  10 mg Oral Daily    apixaban  5 mg Oral BID    rosuvastatin  20 mg Oral Nightly    sotalol  40 mg Oral BID     Continuous Infusions:      CBC   Recent Labs     02/10/23  1327 02/11/23  0059   WBC 5.8 5.9   HGB 13.0* 13.0*   HCT 39.3* 38.5*    153      BMP   Recent Labs     02/10/23  1327 02/11/23  0334 02/11/23  1618 02/12/23  0105   * 138 134* 136   K 4.8  --  5.1 4.6   CL 99  --  103 104   CO2 21  --  23 24   PHOS  --   --   --  3.4   BUN 22  --  19 18   CREATININE 1.4*  --  1.2 1.4*     Hepatic:   Recent Labs     02/10/23  1327   AST 28   ALT 21   BILITOT 0.5   ALKPHOS 71     Troponin: No results for input(s): TROPONINI in the last 72 hours. BNP: No results for input(s): BNP in the last 72 hours. Lipids:   Recent Labs     02/11/23  0059   CHOL 126   HDL 54     ABGs:   Lab Results   Component Value Date/Time    PO2ART 439.0 01/21/2021 08:08 AM    ZVO6OHR 37.7 01/21/2021 08:08 AM     INR: No results for input(s): INR in the last 72 hours.   Renal Labs  Albumin:    Lab Results   Component Value Date/Time    LABALBU 3.8 02/12/2023 01:05 AM     Calcium:    Lab Results   Component Value Date/Time    CALCIUM 8.7 02/12/2023 01:05 AM     Phosphorus:    Lab Results   Component Value Date/Time    PHOS 3.4 02/12/2023 01:05 AM     U/A:    Lab Results   Component Value Date/Time    NITRU NEGATIVE 02/10/2023 09:30 PM    COLORU YELLOW 02/10/2023 09:30 PM    WBCUA <1 05/27/2022 01:30 PM    RBCUA <1 05/27/2022 01:30 PM    MUCUS RARE 05/27/2022 01:30 PM    TRICHOMONAS NONE SEEN 05/27/2022 01:30 PM    BACTERIA NEGATIVE 05/27/2022 01:30 PM    CLARITYU CLEAR 02/10/2023 09:30 PM    SPECGRAV <1.005 02/10/2023 09:30 PM    UROBILINOGEN 0.2 02/10/2023 09:30 PM    BILIRUBINUR NEGATIVE 02/10/2023 09:30 PM    BLOODU NEGATIVE 02/10/2023 09:30 PM    KETUA NEGATIVE 02/10/2023 09:30 PM     ABG:    Lab Results   Component Value Date/Time    PNH8CLK 37.7 01/21/2021 08:08 AM    PO2ART 439.0 01/21/2021 08:08 AM    HHS2WUL 24.1 01/21/2021 08:08 AM     HgBA1c:    Lab Results   Component Value Date/Time    LABA1C 5.3 02/11/2023 12:59 AM     Microalbumen/Creatinine ratio:  No components found for: RUCREAT  TSH:    Lab Results   Component Value Date/Time    TSH 1.72 05/19/2022 09:18 AM     IRON:  No results found for: IRON  Iron Saturation:  No components found for: PERCENTFE  TIBC:  No results found for: TIBC  FERRITIN:  No results found for: FERRITIN  RPR:  No results found for: RPR  ELAYNE:  No results found for: ANATITER, ELAYNE  24 Hour Urine for Creatinine Clearance:  No components found for: CREAT4, UHRS10, UTV10      Objective:   I/O: 02/11 0701 - 02/12 0700  In: 120 [P.O.:120]  Out: 250 [Urine:250]  I/O last 3 completed shifts: In: 240 [P.O.:240]  Out: 1450 [Urine:1450]  I/O this shift:  In: 240 [P.O.:240]  Out: -   Vitals: /80   Pulse 67   Temp 98 °F (36.7 °C) (Oral)   Resp 18   Ht 5' 10\" (1.778 m)   Wt 162 lb (73.5 kg)   SpO2 95%   BMI 23.24 kg/m²  {  General appearance: awake weak  HEENT: Head: Normal, normocephalic, atraumatic.   Neck: supple, symmetrical, trachea midline  Lungs: diminished breath sounds bilaterally  Heart: S1, S2 normal  Abdomen: abnormal findings:  soft nt  Extremities: edema trace  Neurologic: Mental status: alertness: alert        Assessment and Plan:      IMP:  #1 dizziness  #2 hyponatremia  #3 stage III kidney disease with possible acute renal failure  4 hypertension  #5 congestive heart failure EF 50 to 90% with diastolic dysfunction  #6 positive sinusitis    Plan     #1 dizziness is improved monitor for now supportive care and hydration  #2 sodium holding stable  #3 renal function holding stable creatinine 1.4 slightly increased will monitor  #4 blood pressure improved and overall stable not appear orthostatic now  #5 cardiac status monitor supportive care hold aggressive diuresis  #6 follow-up cultures no active fever we will monitor  Appears overall improved           Harmony Brumfield MD, MD

## 2023-02-12 NOTE — DISCHARGE SUMMARY
V2.0  Discharge Summary    Name:  Matthew Choi /Age/Sex: 1940 (82 y.o. male)   Admit Date: 2/10/2023  Discharge Date: 23    MRN & CSN:  [id number] & [id number] Encounter Date and Time 23 5:01 PM EST    Attending:  Carmine Dukes MD Discharging Provider: Carmine Dukes MD       Hospital Course:     HPI:   Chief Complaint: Dizziness  Matthew Choi is a 82 y.o. male who presents with to the emergency room complaining of dizziness, difficulty ambulating, states feels like he has \"brain fog \".  Patient states she was seen 2 days ago in the emergency room for similar symptoms but they are worse today.  States he has some difficulty speaking earlier which is new this morning.  Patient states this morning his gait was unsteady he does endorse having bilateral peripheral neuropathy.  Patient denies any known history of diabetes mellitus.  States his wife is prediabetic and she checks her blood sugar so she will use her equipment to check his in his blood sugars were normal.  He states he feels like he is spinning and the room is not spinning he feels like he is moving it makes him feel nauseated.  Denies any recent falls or injuries.  Denies any chest pain or palpitations no recent illnesses no fever, chills or body aches.  States he was told the other day that he had sinusitis and was prescribed antibiotic but did not start medication as of yet.  He states he does have a history of chronic sinusitis.    Problem Based Course:   Patient had dizziness and dysarthria.  CTA of the head and neck showed no occlusion or stenosis.  CT of the head showed no acute process.  MRI of the brain showed no intracranial abnormality. There was concern that dizziness may have been related to elevated blood pressures.  During his inpatient stay patient was orthostatic positive.  He was on a statin, and anticoagulation.  He received PT/OT/SLP.  Troponins were negative.  Patient was placed on Antivert.  He was also given  midodrine for his orthostatic hypotension. His orthostatic blood pressures did improve. Patient had hyponatremia. Sodium was 129. He was given IV fluids. Sodium improved. Patient was taken off his clonidine and spironolactone as his blood pressures were running on the lower side of normal during his inpatient stay. Patient was stable. He was able to ambulate without difficulty. He had no further trouble with his speech. He was discharged to home. Consults this admission:  IP CONSULT TO NEUROLOGY  IP CONSULT TO NEPHROLOGY  IP CONSULT TO CARDIOLOGY    Discharge Diagnosis:     Dizziness and dysarthria  Uncontrolled hypertension  Hyponatremia  Paroxysmal A-fib-Hx ablation. CKD 3  Diastolic CHF  Paroxysmal A-fib  LUCILLE  Hyperlipidemia    Discharge Instruction:   Medications: see computerized discharge medication list  Activity: activity as tolerated  Diet: regular diet   Disposition: home  Discharged Condition: Stable         Discharge Medications:        Medication List        START taking these medications      midodrine 5 MG tablet  Commonly known as: PROAMATINE  Take 1 tablet by mouth 2 times daily as needed (give sbp < 95)            CONTINUE taking these medications      acetaminophen 325 MG tablet  Commonly known as: TYLENOL     APPLE CIDER VINEGAR PO     azelastine 0.1 % nasal spray  Commonly known as: ASTELIN     azithromycin 250 MG tablet  Commonly known as: ZITHROMAX  Take 1 tablet by mouth See Admin Instructions for 5 days 500mg on day 1 followed by 250mg on days 2 - 5     buPROPion 150 MG extended release tablet  Commonly known as:  Wellbutrin SR  Take 1 tablet by mouth daily     citalopram 10 MG tablet  Commonly known as: CeleXA  Take 1 tablet by mouth daily     COLLAGEN PO     Eliquis 5 MG Tabs tablet  Generic drug: apixaban  TAKE 1 TABLET BY MOUTH TWICE A DAY     fluticasone 50 MCG/ACT nasal spray  Commonly known as: FLONASE     MULTIVITAMIN MEN 50+ PO     rosuvastatin 20 MG tablet  Commonly known as: CRESTOR     sotalol 80 MG tablet  Commonly known as: BETAPACE  Take 0.5 tablets by mouth 2 times daily            STOP taking these medications      cloNIDine 0.1 MG tablet  Commonly known as: CATAPRES     oxymetazoline 0.05 % nasal spray  Commonly known as: 12 Hour Nasal Spray     spironolactone 25 MG tablet  Commonly known as: Aldactone               Where to Get Your Medications        These medications were sent to [facility] [place] [facility] [address] - P [phone] - F [phone]  [address] [place] [address]      Phone: [phone]   midodrine 5 MG tablet        Objective Findings at Discharge:   BP (!) 157/86   Pulse 63   Temp 98 °F (36.7 °C) (Oral)   Resp 18   Ht 5' 10\" (1.778 m)   Wt 162 lb (73.5 kg)   SpO2 99%   BMI 23.24 kg/m²       Physical Exam:   General: NAD, sitting in chair next to window  Eyes: no discharge  HENT: NCAT  Cardiovascular: RRR  Respiratory: Clear to auscultation   Gastrointestinal: Soft, non tender, nondistended  Genitourinary: no suprapubic tenderness  Musculoskeletal: No edema, nontender  Skin: warm, dry, no gross lesions  Neuro: Alert. No gross deficits  Psych: Mood appropriate. Imaging   MRI brain without contrast  Result Date: 2/12/2023  No acute intracranial abnormality.        Time Spent Discharging patient 24 minutes    Electronically signed by Estrellita Moe MD on 2/12/2023 at 5:01 PM

## 2023-02-14 ENCOUNTER — OFFICE VISIT (OUTPATIENT)
Dept: NEUROLOGY | Age: 83
End: 2023-02-14
Payer: MEDICARE

## 2023-02-14 VITALS
HEART RATE: 59 BPM | DIASTOLIC BLOOD PRESSURE: 86 MMHG | WEIGHT: 162 LBS | BODY MASS INDEX: 23.19 KG/M2 | SYSTOLIC BLOOD PRESSURE: 136 MMHG | HEIGHT: 70 IN | OXYGEN SATURATION: 99 %

## 2023-02-14 DIAGNOSIS — E87.8 DISEQUILIBRIUM SYNDROME: ICD-10-CM

## 2023-02-14 DIAGNOSIS — Z99.89 OSA ON CPAP: ICD-10-CM

## 2023-02-14 DIAGNOSIS — I10 PRIMARY HYPERTENSION: ICD-10-CM

## 2023-02-14 DIAGNOSIS — G47.33 OSA ON CPAP: ICD-10-CM

## 2023-02-14 DIAGNOSIS — F41.9 ANXIETY: ICD-10-CM

## 2023-02-14 DIAGNOSIS — G60.9 IDIOPATHIC PERIPHERAL NEUROPATHY: Primary | ICD-10-CM

## 2023-02-14 DIAGNOSIS — I48.91 ATRIAL FIBRILLATION, UNSPECIFIED TYPE (HCC): ICD-10-CM

## 2023-02-14 DIAGNOSIS — N18.31 STAGE 3A CHRONIC KIDNEY DISEASE (HCC): ICD-10-CM

## 2023-02-14 PROCEDURE — 3079F DIAST BP 80-89 MM HG: CPT | Performed by: NURSE PRACTITIONER

## 2023-02-14 PROCEDURE — 99215 OFFICE O/P EST HI 40 MIN: CPT | Performed by: NURSE PRACTITIONER

## 2023-02-14 PROCEDURE — 1123F ACP DISCUSS/DSCN MKR DOCD: CPT | Performed by: NURSE PRACTITIONER

## 2023-02-14 PROCEDURE — 3075F SYST BP GE 130 - 139MM HG: CPT | Performed by: NURSE PRACTITIONER

## 2023-02-14 RX ORDER — GABAPENTIN 100 MG/1
100 CAPSULE ORAL 3 TIMES DAILY
Qty: 90 CAPSULE | Refills: 3 | Status: SHIPPED | OUTPATIENT
Start: 2023-02-14 | End: 2023-08-13

## 2023-02-14 NOTE — PROGRESS NOTES
2/14/23    Caryle Almond  1940    Chief Complaint   Patient presents with    Dizziness     Pt presents for f/u of dizziness, pt states he has a lot of pressure in his frontal lobe area and behind his eyes, pt states confusion and unsteady gait comes with it        History of Present Illness  Jonathan Webb is a [de-identified] y.o. male presenting today for follow-up of: Hospital follow-up from occurrence on 2/11/2023 for dizziness described as being off balance with brain fog and slurred speech secondary to known BLE PN superimposed on acute hyponatremia and orthostasis. Patient does have history of migraines as well as A-fib. At time of hospital neuro exam patient reported symptoms had subsided however he was concerned with a bilateral sharp temple pain and generalized headache which she had reported on previous office visit but reported that the symptoms were not bothersome for him at that time. CRP and sed rate were completed during hospitalization, results WNL. He reported having a dull daily headache. Patient does have history of LUCILLE and wears CPAP. Last office visit note with sleep provider 1/2023 stated that patient was using his CPAP, sleeping well however a download was not available. He also has hearing loss and tinnitus. CTA Head and Neck showed no occlusion or stenosis, CT head no acute process, MRI brain no intracranial abnormality. Patient was started on Antivert. Patient was orthostatic positive with his blood pressures during hospitalization, he was started on midodrine. For natremia, he was given IV fluids and sodium improved, management deferred to nephrology. Patient remains on Eliquis and sotalol for management of paroxysmal A-fib. On last office visit  EMG studies were ordered of lower extremities and labs for reversible causes were ordered for peripheral neuropathy. Labs were WNL, EMG showed mild to moderate chronic bilateral peripheral neuropathy.   The initiation of gabapentin 100 mg 2-3 times daily for management of headaches was discussed on last office visit however patient was not interested at that time. Patient has participated in vestibular therapy as reported on office visit note August 2022 where he reported vestibular therapy helping with balance. He reports today that he was started on wellbutryn in January by nephrology and has been experiencing dry mouth, confusion, slight hallucinations occasionally. He also reports feeling nauseous and flushed usually triggered by dry mouth. He has not contacted nephrology to report this. He feels off balanced and feels like his equilibrium is off. He continues to have daily headache, reported previously as pressure in his head and eyes. He has not yet followed up with nephrology, PCP or cardiology. He has not yet picked up midodrine.      Current Outpatient Medications   Medication Sig Dispense Refill    buPROPion (WELLBUTRIN SR) 150 MG extended release tablet TAKE 1 TABLET BY MOUTH EVERY DAY 30 tablet 3    midodrine (PROAMATINE) 5 MG tablet Take 1 tablet by mouth 2 times daily as needed (give sbp < 95) 60 tablet 0    citalopram (CELEXA) 10 MG tablet Take 1 tablet by mouth daily 90 tablet 3    azelastine (ASTELIN) 0.1 % nasal spray 1 spray by Nasal route 2 times daily Use in each nostril as directed      fluticasone (FLONASE) 50 MCG/ACT nasal spray 1 spray by Each Nostril route 2 times daily      acetaminophen (TYLENOL) 325 MG tablet Take 650 mg by mouth as needed for Pain      Multiple Vitamins-Minerals (MULTIVITAMIN MEN 50+ PO) Take 1 tablet by mouth daily      APPLE CIDER VINEGAR PO Take by mouth daily Gummies      COLLAGEN PO Take by mouth daily Gummies      ELIQUIS 5 MG TABS tablet TAKE 1 TABLET BY MOUTH TWICE A DAY 60 tablet 5    sotalol (BETAPACE) 80 MG tablet Take 0.5 tablets by mouth 2 times daily 60 tablet 0    rosuvastatin (CRESTOR) 20 MG tablet Take 20 mg by mouth nightly       No current facility-administered medications for this visit.       Physical Exam:  Also present during visit: son. Mental Status              Orientation: oriented to person, oriented to place, oriented to problem, and oriented to time                        Mood/affectappropriate mood and appropriate affect              Memory/Other: recent memory intact, remote memory intact, fund of knowledge intact, attention span normal, and concentration normal  Language  Language: (normal) language, no dysarthria, (normal) articulation, and no dysphasia/aphasia  Cranial Nerves              Eyes: pupils normal size and reactive to light and visual fields appear full              CN III, IV, VI : extraocular muscle strength normal, normal pursuit, no nystagmus, and no ptosis              Facial Motor: normal facial motor              CN XII: tongue protrudes midline  Motor/Coordination Exam              Power: motor strength appears intact throughout, no arm drift, and normal tone              Coordination: normal finger-to-nose, forearm rotation intact, and rapid alternating movement normal  Gait and Stance               Gait/Posture: station normal, ambulates independently, Romberg's test mild-moderate with eyes closed, wide based stance and wide-based gait  Sensory              Sensation: normal light touch, normal pinprick, normal vibration, no neglect and decreased vibration lowers Severe with vibration loss up to the knees bilaterally. /86 (Site: Left Upper Arm, Position: Sitting, Cuff Size: Medium Adult)   Pulse 59   Ht 5' 10\" (1.778 m)   Wt 162 lb (73.5 kg)   SpO2 99%   BMI 23.24 kg/m²     Assessment and Plan     Diagnosis Orders   1. Idiopathic peripheral neuropathy        2. Disequilibrium syndrome        3. Atrial fibrillation, unspecified type ([address]        4. LUCILLE on CPAP        5. Stage 3a chronic kidney disease ([address]        6. Primary hypertension        7.  Anxiety          I have asked Carlos Fisher to reach out to his sleep provider to determine why he was unable to get a download off of his device on last office visit 1/18/2023.  It is imperative that a download is acquired so that it can be determined whether or not his LUCILLE is actually well managed, this would be indicated by having less than 5 apneic events per hour.  I suspect there may be an issue with his LUCILLE due to ongoing reports of daily headaches.  I have encouraged Osito to follow-up with PCP, cardiology and nephrology to discuss symptoms reported today that have been ongoing or worsened since starting Wellbutrin.  PCP or cardiology will manage orthostatic hypotension.  I am going to start Osito on gabapentin 100 mg titrating slowly up to 3 times daily as tolerated and needed for management of headaches.  Once we determine if LUCILLE is well managed, we will revisit the need for gabapentin.  All imaging and testing completed in hospital was WNL.     I am also going to refer Osito to balance therapy as well as vestibular therapy as he has reported on previous visits that vestibular therapy was helpful with his symptoms of disequilibrium syndrome.  Balance therapy necessary related to peripheral neuropathy.  I will plan on following up with Osito again in 3 months, sooner if the need arises.    Medications prescribed for the patient were discussed in detail.  This included a discussion of the potential risks versus potential benefits of the medications.  The patient was given time to ask questions and these were answered to the best of my ability.  The patient appeared to understand the information provided.      Return in about 3 months (around 5/14/2023).    TYRA Elise - KATIUSKA

## 2023-02-23 ENCOUNTER — HOSPITAL ENCOUNTER (OUTPATIENT)
Age: 83
Discharge: HOME | End: 2023-02-23
Payer: MEDICARE

## 2023-02-23 LAB
ALBUMIN SERPL-MCNC: 3.9 GM/DL (ref 3.4–5)
ANION GAP SERPL CALCULATED.3IONS-SCNC: 9 MMOL/L (ref 4–16)
BASOPHILS ABSOLUTE: 0 K/CU MM
BASOPHILS RELATIVE PERCENT: 0.6 % (ref 0–1)
BILIRUBIN URINE: NEGATIVE MG/DL
BLOOD, URINE: NEGATIVE
BUN SERPL-MCNC: 14 MG/DL (ref 6–23)
CALCIUM SERPL-MCNC: 8.8 MG/DL (ref 8.3–10.6)
CHLORIDE BLD-SCNC: 103 MMOL/L (ref 99–110)
CLARITY: CLEAR
CO2: 26 MMOL/L (ref 21–32)
COLOR: YELLOW
COMMENT UA: NORMAL
CREAT SERPL-MCNC: 1.1 MG/DL (ref 0.9–1.3)
DIFFERENTIAL TYPE: ABNORMAL
EOSINOPHILS ABSOLUTE: 0.1 K/CU MM
EOSINOPHILS RELATIVE PERCENT: 2.6 % (ref 0–3)
ESTIMATED AVERAGE GLUCOSE: 114 MG/DL
GFR SERPL CREATININE-BSD FRML MDRD: >60 ML/MIN/1.73M2
GLUCOSE SERPL-MCNC: 82 MG/DL (ref 70–99)
GLUCOSE, URINE: NEGATIVE MG/DL
HBA1C MFR BLD: 5.6 % (ref 4.2–6.3)
HCT VFR BLD CALC: 42 % (ref 42–52)
HEMOGLOBIN: 13.2 GM/DL (ref 13.5–18)
IMMATURE NEUTROPHIL %: 0.2 % (ref 0–0.43)
KETONES, URINE: NEGATIVE MG/DL
LEUKOCYTE ESTERASE, URINE: NEGATIVE
LYMPHOCYTES ABSOLUTE: 1.2 K/CU MM
LYMPHOCYTES RELATIVE PERCENT: 23.6 % (ref 24–44)
MCH RBC QN AUTO: 29.9 PG (ref 27–31)
MCHC RBC AUTO-ENTMCNC: 31.4 % (ref 32–36)
MCV RBC AUTO: 95 FL (ref 78–100)
MONOCYTES ABSOLUTE: 0.5 K/CU MM
MONOCYTES RELATIVE PERCENT: 9.4 % (ref 0–4)
NITRITE URINE, QUANTITATIVE: NEGATIVE
NUCLEATED RBC %: 0 %
PDW BLD-RTO: 12.4 % (ref 11.7–14.9)
PH, URINE: 5.5 (ref 5–8)
PHOSPHORUS: 3.4 MG/DL (ref 2.5–4.9)
PLATELET # BLD: 152 K/CU MM (ref 140–440)
PMV BLD AUTO: 10.3 FL (ref 7.5–11.1)
POTASSIUM SERPL-SCNC: 4.9 MMOL/L (ref 3.5–5.1)
PROTEIN UA: NEGATIVE MG/DL
RBC # BLD: 4.42 M/CU MM (ref 4.6–6.2)
SEGMENTED NEUTROPHILS ABSOLUTE COUNT: 3.2 K/CU MM
SEGMENTED NEUTROPHILS RELATIVE PERCENT: 63.6 % (ref 36–66)
SODIUM BLD-SCNC: 138 MMOL/L (ref 135–145)
SPECIFIC GRAVITY UA: <1.005 (ref 1–1.03)
TOTAL IMMATURE NEUTOROPHIL: 0.01 K/CU MM
TOTAL NUCLEATED RBC: 0 K/CU MM
UROBILINOGEN, URINE: 0.2 MG/DL (ref 0.2–1)
WBC # BLD: 5.1 K/CU MM (ref 4–10.5)

## 2023-02-23 PROCEDURE — 36415 COLL VENOUS BLD VENIPUNCTURE: CPT

## 2023-02-23 PROCEDURE — 82040 ASSAY OF SERUM ALBUMIN: CPT

## 2023-02-23 PROCEDURE — 85025 COMPLETE CBC W/AUTO DIFF WBC: CPT

## 2023-02-23 PROCEDURE — 84100 ASSAY OF PHOSPHORUS: CPT

## 2023-02-23 PROCEDURE — 80061 LIPID PANEL: CPT

## 2023-02-23 PROCEDURE — 81003 URINALYSIS AUTO W/O SCOPE: CPT

## 2023-02-23 PROCEDURE — 80048 BASIC METABOLIC PNL TOTAL CA: CPT

## 2023-02-23 PROCEDURE — 83036 HEMOGLOBIN GLYCOSYLATED A1C: CPT

## 2023-02-24 LAB
CHOLEST SERPL-MCNC: 130 MG/DL
HDLC SERPL-MCNC: 51 MG/DL
LDLC SERPL CALC-MCNC: 59 MG/DL
TRIGL SERPL-MCNC: 98 MG/DL

## 2023-03-01 PROBLEM — N18.2 CHRONIC KIDNEY DISEASE, STAGE II (MILD): Status: ACTIVE | Noted: 2023-03-01

## 2023-03-09 ENCOUNTER — HOSPITAL ENCOUNTER (OUTPATIENT)
Dept: PHYSICAL THERAPY | Age: 83
Setting detail: THERAPIES SERIES
Discharge: HOME | End: 2023-03-09
Payer: MEDICARE

## 2023-03-09 PROCEDURE — 97110 THERAPEUTIC EXERCISES: CPT

## 2023-03-09 PROCEDURE — 97161 PT EVAL LOW COMPLEX 20 MIN: CPT

## 2023-03-09 NOTE — PROGRESS NOTES
Physical Therapy: Initial Evaluation    Patient: Temitope Abraham (86 y.o. male)   Examination Date:   Plan of Care Certification [id number] to        :  1940 ;    Confirmed: Y MRN: [id number]  CSN: [id number]   Insurance: Payor: Trent Philip / Plan: Polly Laverne / Product Type: *No Product type* /   Insurance ID: [id number] - (Medicare Managed) Secondary Insurance (if applicable):    Referring Physician: TYRA Najera NP  Visits to Date/Visits Approved:   /      No Show/Cancelled Appts:   /       Medical Diagnosis: disequilibrium syndrome  Treatment Diagnosis: impaired gait, impaired balance     PERTINENT MEDICAL HISTORY   Patient Assessed for Rehabilitation Services: Yes       Medical History: Chart Reviewed: Yes    Past Medical History:   Diagnosis Date    Arthritis     CAD (coronary artery disease)     CKD (chronic kidney disease), stage III ([facility]     Hyperlipidemia     Hypertension     S/P ablation of atrial fibrillation 2021    Atrial fibrillation and atrial flutter ablation by Dr. Lis Villanueva. Surgical History:   Past Surgical History:   Procedure Laterality Date    COLONOSCOPY  15    Diverticulosis    CYST REMOVAL      baker cyst on right knee    HERNIA REPAIR      PTCA         Medications:   Current Outpatient Medications:     CLONIDINE HCL PO, Take by mouth as needed, Disp: , Rfl:     gabapentin (NEURONTIN) 100 MG capsule, Take 1 capsule by mouth 3 times daily for 180 days. Intended supply: 90 days (Patient taking differently: Take 100 mg by mouth 2 times daily.  Intended supply: 90 days), Disp: 90 capsule, Rfl: 3    midodrine (PROAMATINE) 5 MG tablet, Take 1 tablet by mouth 2 times daily as needed (give sbp < 95), Disp: 60 tablet, Rfl: 0    azelastine (ASTELIN) 0.1 % nasal spray, 1 spray by Nasal route 2 times daily Use in each nostril as directed, Disp: , Rfl:     fluticasone (FLONASE) 50 MCG/ACT nasal spray, 1 spray by Each Nostril route 2 times daily, Disp: , Rfl:     acetaminophen (TYLENOL) 325 MG tablet, Take 650 mg by mouth as needed for Pain, Disp: , Rfl:     Multiple Vitamins-Minerals (MULTIVITAMIN MEN 50+ PO), Take 1 tablet by mouth daily, Disp: , Rfl:     APPLE CIDER VINEGAR PO, Take by mouth daily Gummies, Disp: , Rfl:     COLLAGEN PO, Take by mouth daily Gummies, Disp: , Rfl:     ELIQUIS 5 MG TABS tablet, TAKE 1 TABLET BY MOUTH TWICE A DAY, Disp: 60 tablet, Rfl: 5    sotalol (BETAPACE) 80 MG tablet, Take 0.5 tablets by mouth 2 times daily, Disp: 60 tablet, Rfl: 0    rosuvastatin (CRESTOR) 20 MG tablet, Take 20 mg by mouth nightly, Disp: , Rfl:   Allergies: Patient has no known allergies. SUBJECTIVE EXAMINATION     History obtained from[de-identified] Patient, Chart Review,           Subjective History:   Pt reports that he has had balance problems for the last three years, ever since his a-fib episode. Pt describes symptoms as an unsteady feeling, denies any dizziness. Pt denies any pain. Pt reports N/T in bilateral feet. Pt denies any falls.     Additional Pertinent Hx (if applicable): HTN, COPD, Prostate - metal clips     Prior diagnostic testing[de-identified] MRI, CT Scan (negative for stroke)  Previous treatments prior to current episode?: Outpatient PT       Learning/Language: Learning  Does the patient/guardian have any barriers to learning?: No barriers     Pain Screening    Pain Screening  Patient Currently in Pain: Denies    Functional Status         Social History:    Social History  Lives With: Spouse  Type of Home: House  Home Layout: Two level  Home Access: Stairs to enter without rails  Entrance Stairs - Number of Steps: 3    Occupation/Interests:  Occupation: Retired    Prior Level of Function:    Independent        Current Level of Function:  MI w/ ADLs, can do IADLs but has to move slower and more careful              OBJECTIVE EXAMINATION   Restrictions:  Restrictions/Precautions: None           Review of Systems:  Vision: Within Functional Limits (reading glasses)  Hearing: Within functional limits  Follows Commands: Within Functional Limits         Ambulation/Gait (if applicable): Pt amb with slow, steady gait. No AD     Balance  Comments: Romberg EO 30 sec, Romberg EC 20 sec w/ mod sway, tandem on foam EO 30 sec min sway, tandem on foam EC 30 sec w/ mod sway and min A to correct balance, marching on foam EO 30 sec minimal sway, marching on foam EC 15 sec with mod sway and min A to correct balance    Neuro Screen:  Sensation      Sensation  Overall Sensation Status:  (N/T in bilat feet secondary to peripheral neuropathy)       Left Strength  Right Strength         Strength LLE  Strength LLE: WFL    Strength RLE  Strength RLE: WFL     Additional Finding(s) (if applicable): Additional Measures  Special Tests: LING/56, DGI:     ABC: 81.25%       ASSESSMENT     Impression:Assessment: Pt is an 82-year-old male who presents to PT with dysequilibrium following a bout of a-fib that occurred three years ago. Upon assessment, pt demonstrated impaired gait and impaired balance which limits pt’s ability to safely complete ADLs and IADLs indep. Objective testing showed impairments in both proprioception and vestibular function, leading to impaired balance. Pt would benefit from skilled PT to address current impairments, improve balance and gait, and reduce the risk of falls, future injury, or further decline.    Body Structures, Functions, Activity Limitations Requiring Skilled Therapeutic Intervention: Decreased functional mobility , Decreased body mechanics, Decreased sensation, Decreased balance, Decreased high-level IADLs    Statement of Medical Necessity: Physical Therapy is both indicated and medically necessary as outlined in the POC to increase the likelihood of meeting the functionally related goals stated below.     Patient agrees with established plan of care and assisted in the development of their short term and long term goals. Patient had no adverse  reaction with initial treatment and there are no barriers to learning. Learning preferences include demonstration, practice, and handouts. Patient expressed understanding of HEP and appears to be motivated to participate in an active PT program including compliance with HEP expectations. Patient's Activity Tolerance: Patient tolerated treatment well        Patient's rehabilitation potential/prognosis is considered to be: Good    Factors which may impact rehabilitation potential include: None        GOALS     Patient Goal(s): improve balance  Short Term Goals Completed by 8 Goal Status   See LTG New                                                             Long Term Goals Completed by 8 Goal Status   Pt to have 50% subjective improvement in overall condition. New   Pt will improve DGI score to 22 or higher in order to improve safer gait with activities. New   Pt will improve marching on foam with EC to 30 seconds with no sway to show improvement in pt's vestibular function. New   Pt will improve SLS to 5 seconds with no LOB to improve safety with stair amb at home.  New                                            TREATMENT PLAN       Requires PT Follow-Up: Yes  Treatment Initiated : Yes 3/9/23  Specific Instructions for Next Treatment: EC on foam balance, step ups, fwd/retro amb with EC, fwd/retro weight shifts, cone taps    Pt. actively involved in establishing Plan of Care and Goals: Y  Patient/ Caregiver education and instruction:Goals, PT Role, Plan of Care, Evaluative findings, Home Exercise Program, Gait Training             Treatment may include any combination of the following: Current Treatment Recommendations: Strengthening, Balance training, Functional mobility training, ADL/Self-care training, IADL training, Gait training, Stair training, Neuromuscular re-education, Manual, Home exercise program, Patient/Caregiver education & training, Safety education & training, Therapeutic activities     Frequency / Duration:  Patient to be seen 2x/week for 4 weeks       Eval Complexity:    Decision Making: Low Complexity          Therapist Signature: Priya Pavon, CAROLE    Date: 3/9/2023     I certify that the above Therapy Services are being furnished while the patient is under my care. I agree with the treatment plan and certify that this therapy is necessary.      Physician's Signature:  ___________________________   Date:_______                                                                   Tiffany Horvath APRN - NP        Physician Comments: _______________________________________________    Please sign and return to [facility].  Please fax to the location listed below. THANK YOU for this referral!    [facility]  [address]  [facility] [address]  Dept: [phone]  Dept Fax: [phone]  Loc: [phone]       POC NOTE

## 2023-03-09 NOTE — PLAN OF CARE
Outpatient Physical Therapy           [place]           [x] Phone: [phone]   Fax: [phone]  [place]           [] Phone: [phone]   Fax: [phone]     To: Shara Ro NP   From: CAROLE Hinton. Marlene Rowe, PT, DPT    Patient: Mark Mcpherson       : 1940  Diagnosis: Diagnosis: disequilibrium syndrome  Treatment Diagnosis: impaired gait, impaired balance  Date: 3/9/2023    Physical Therapy Certification/Re-Certification Form  Dear Shara Ro NP,   The following patient has been evaluated for physical therapy services and for therapy to continue, insurance requires physician review of the treatment plan initially and every 90 days. Please review the attached evaluation and/or summary of the patient's plan of care, and verify that you agree therapy should continue by signing the attached document and sending it back to our office. Assessment:    Assessment: Pt is an 30-year-old male who presents to PT with dysequilibrium following a bout of a-fib that occurred three years ago. Upon assessment, pt demonstrated impaired gait and impaired balance which limits pts ability to safely complete ADLs and IADLs indep. Objective testing showed impairments in both proprioception and vestibular function, leading to impaired balance. Pt would benefit from skilled PT to address current impairments, improve balance and gait, and reduce the risk of falls, future injury, or further decline. Patient agrees with established plan of care and assisted in the development of their short term and long term goals. Patient had no adverse reaction with initial treatment and there are no barriers to learning. Learning preferences include demonstration, practice, and handouts. Patient expressed understanding of HEP and appears to be motivated to participate in an active PT program including compliance with HEP expectations.         Plan of Care/Treatment to date:  [x] Therapeutic Exercise  [] Modalities:  [x] Therapeutic Activity     [] Ultrasound  [] Electrical Stimulation  [x] Gait Training      [] Cervical Traction [] Lumbar Traction  [x] Neuromuscular Re-education    [] Cold/hotpack [] Iontophoresis   [x] Instruction in HEP      [] Vasopneumatic    [] Dry Needling  [x] Manual Therapy               [] Aquatic Therapy       Other:          Frequency/Duration:  # Days per week: [] 1 day # Weeks: [] 1 week [] 5 weeks     [x] 2 days   [] 2 weeks [] 6 weeks     [] 3 days   [] 3 weeks [] 7 weeks     [] 4 days   [x] 4 weeks [] 8 weeks         [] 9 weeks [] 10 weeks         [] 11 weeks [] 12 weeks    Rehab Potential/Progress: [] Excellent [x] Good [] Fair  [] Poor     Goals:    Patient goals: improve balance  Short term goals  Time Frame for Short term goals:   See LTG              Long Term Goals  Time Frame for Long Term Goals: 8  Pt to have 50% subjective improvement in overall condition. Pt will improve DGI score to 22 or higher in order to improve safer gait with activities. Pt will improve marching on foam with EC to 30 seconds with no sway to show improvement in pt's vestibular function. Pt will improve SLS to 5 seconds with no LOB to improve safety with stair amb at home. Electronically signed by:  CAROLE Salas. Yomi Francis PT, DPT.  3/9/2023, 11:06 AM        If you have any questions or concerns, please don't hesitate to call.   Thank you for your referral.      Physician Signature:________________________________Date:_________ TIME: _____  By signing above, therapists plan is approved by physician

## 2023-03-09 NOTE — FLOWSHEET NOTE
Outpatient Physical Therapy  [place]           [x] Phone: [phone]   Fax: [phone]  [place]           [] Phone: [phone]   Fax: [phone]        Physical Therapy Daily Treatment Note  Date:  3/9/2023    Patient Name:  Mattehw Choi    :  1940  MRN: [id number]  Restrictions/Precautions: Restrictions/Precautions: None      Diagnosis:   Diagnosis: disequilibrium syndrome  Treatment Diagnosis:  impaired gait, impaired balance  Insurance/Certification information: [facility] Medicare, [employer]  Referring Physician: Tiffany Horvath NP   Next Doctor Visit:    Plan of care signed (Y/N):  Sent 3/9  Outcome Measure: LING/56, DGI: 20/24, ABC: 81.25%  Visit# / total visits:     Pain level: 0/10   Goals:     Patient goals: improve balance  Short term goals  Time Frame for Short term goals:   See LTG              Long Term Goals  Time Frame for Long Term Goals: 8  Pt to have 50% subjective improvement in overall condition.  Pt will improve DGI score to 22 or higher in order to improve safer gait with activities.  Pt will improve marching on foam with EC to 30 seconds with no sway to show improvement in pt's vestibular function.  Pt will improve SLS to 5 seconds with no LOB to improve safety with stair amb at home.         Summary of Evaluation:  Assessment: Pt is an 82-year-old male who presents to PT with dysequilibrium following a bout of a-fib that occurred three years ago. Upon assessment, pt demonstrated impaired gait and impaired balance which limits pt’s ability to safely complete ADLs and IADLs indep. Objective testing showed impairments in both proprioception and vestibular function, leading to impaired balance. Pt would benefit from skilled PT to address current impairments, improve balance and gait, and reduce the risk of falls, future injury, or further decline.        Subjective:  See eval     Any changes in Ambulatory Summary Sheet?  None    Objective:  See eval       Exercises: (No more  than 4 columns)   Exercise/Equipment 3/9/23 #1 Date Date           WARM UP                     TABLE                                       STANDING      marches 2x10     Hip abduction 2x10     Sit to stand 1x10     Romberg on foam DLS EO/EC, tandem EO/EC         Marching on foam EO/EC     Fwd/Retro Weight Shifts      Step Ups      Cone taps                  PROPRIOCEPTION                                    MODALITIES                      Other Therapeutic Activities/Education:  HEP, safety at home       Home Exercise Program: standing marches, standing hip abduction, sit to stands        Manual Treatments:  none      Modalities:  none      Communication with other providers:  POC sent      Assessment:  Pt tolerated tx well without adverse reactions or complications. End pain: 0/10  Assessment: Pt is an 29-year-old male who presents to PT with dysequilibrium following a bout of a-fib that occurred three years ago. Upon assessment, pt demonstrated impaired gait and impaired balance which limits pts ability to safely complete ADLs and IADLs indep. Objective testing showed impairments in both proprioception and vestibular function, leading to impaired balance. Pt would benefit from skilled PT to address current impairments, improve balance and gait, and reduce the risk of falls, future injury, or further decline.       Plan for Next Session:   Specific Instructions for Next Treatment: EC on foam balance, step ups, fwd/retro amb with EC, fwd/retro weight shifts, cone taps    Time In / Time Out:    0900 - 0934      Timed Code/Total Treatment Minutes:  34'/ 8' TE x1, 26' eval x1      Next Progress Note due:  10th visit      Plan of Care Interventions:  [x] Therapeutic Exercise  [] Modalities:  [x] Therapeutic Activity     [] Ultrasound  [] Estim  [x] Gait Training      [] Cervical Traction [] Lumbar Traction  [x] Neuromuscular Re-education    [] Cold/hotpack [] Iontophoresis   [x] Instruction in HEP      [] Vasopneumatic [] Dry Needling    [x] Manual Therapy               [] Aquatic Therapy              Electronically signed by:  Devan Banks, SPT 3/9/2023, 11:06 AM

## 2023-03-17 ENCOUNTER — HOSPITAL ENCOUNTER (OUTPATIENT)
Dept: PHYSICAL THERAPY | Age: 83
Setting detail: THERAPIES SERIES
Discharge: HOME | End: 2023-03-17
Payer: MEDICARE

## 2023-03-17 PROCEDURE — 97112 NEUROMUSCULAR REEDUCATION: CPT

## 2023-03-17 PROCEDURE — 97110 THERAPEUTIC EXERCISES: CPT

## 2023-03-17 NOTE — FLOWSHEET NOTE
Outpatient Physical Therapy  [place]           [x] Phone: [phone]   Fax: [phone]  [place]           [] Phone: [phone]   Fax: [phone]        Physical Therapy Daily Treatment Note  Date:  3/17/2023    Patient Name:  Beck Salmeron    :  1940  MRN: [id number]  Restrictions/Precautions: Restrictions/Precautions: None  Diagnosis:   Diagnosis: disequilibrium syndrome  Treatment Diagnosis:  impaired gait, impaired balance  Insurance/Certification information: [employer] Medicare, [facility]  Referring Physician: Alex Mooney NP   Next Doctor Visit:    Plan of care signed (Y/N):  Sent 3/9  Outcome Measure: LING/56, DGI: , ABC: 81.25%  Visit# / total visits:     Pain level:      0/10       Goals:     Patient goals: improve balance  Short term goals  Time Frame for Short term goals:   See LTG  Long Term Goals  Time Frame for Long Term Goals: 8  Pt to have 50% subjective improvement in overall condition. Pt will improve DGI score to 22 or higher in order to improve safer gait with activities. Pt will improve marching on foam with EC to 30 seconds with no sway to show improvement in pt's vestibular function. Pt will improve SLS to 5 seconds with no LOB to improve safety with stair amb at home. Summary of Evaluation:  Assessment: Pt is an 72-year-old male who presents to PT with dysequilibrium following a bout of a-fib that occurred three years ago. Upon assessment, pt demonstrated impaired gait and impaired balance which limits pts ability to safely complete ADLs and IADLs indep. Objective testing showed impairments in both proprioception and vestibular function, leading to impaired balance. Pt would benefit from skilled PT to address current impairments, improve balance and gait, and reduce the risk of falls, future injury, or further decline. Subjective:   Pt arrives to tx session reporting 0/10 pain; denies falls since last visit. Reports compliance with HEP.       Any changes in Ambulatory Summary Sheet? None      Objective:  COVID screening questions were asked and patient attested that there had been no contact or symptoms    Exercises: (No more than 4 columns)   Exercise/Equipment 3/9/23 #1 3/17/23 #2 Date           WARM UP                     TABLE                                       STANDING      marches 2x10 2x10    Hip abduction 2x10 2x10    Sit to stand 1x10 2x10    Romberg on foam DLS EO/EC, tandem EO/EC DLS EO/EC, tandem EO/EC        Marching on foam EO/EC 2x10 EO    Fwd/Retro Weight Shifts      Step Ups  X12 ea LE 6\"    Cone taps  2x10    Ball pass w/ cone  2 laps    Tandem walking   2 Laps     Walking fwd   EC 2 Laps                       PROPRIOCEPTION                                    MODALITIES                      Other Therapeutic Activities/Education:  HEP, safety at home       Home Exercise Program: standing marches, standing hip abduction, sit to stands        Manual Treatments:  none      Modalities:  none      Communication with other providers:  POC sent      Assessment:  Pt tolerated tx well without adverse reactions or complications. Tolerates added exercises well without issue. During cone taps pt with LOB R requiring Christophe to correct. Pt would benefit from skilled PT to address current impairments, improve balance and gait, and reduce the risk of falls, future injury, or further decline.   End pain: 0/10       Plan for Next Session:        Time In / Time Out:     9976 / 6502      Timed Code/Total Treatment Minutes:   30'/30'    1 TE    1 NR      Next Progress Note due:  10th visit      Plan of Care Interventions:  [x] Therapeutic Exercise  [] Modalities:  [x] Therapeutic Activity     [] Ultrasound  [] Estim  [x] Gait Training      [] Cervical Traction [] Lumbar Traction  [x] Neuromuscular Re-education    [] Cold/hotpack [] Iontophoresis   [x] Instruction in HEP      [] Vasopneumatic   [] Dry Needling    [x] Manual Therapy               [] Aquatic Therapy              Electronically signed by:  Lucy Knox, PIA    3/17/2023, 3:36 PM

## 2023-03-21 ENCOUNTER — HOSPITAL ENCOUNTER (OUTPATIENT)
Dept: PHYSICAL THERAPY | Age: 83
Setting detail: THERAPIES SERIES
Discharge: HOME | End: 2023-03-21
Payer: MEDICARE

## 2023-03-21 PROCEDURE — 97112 NEUROMUSCULAR REEDUCATION: CPT

## 2023-03-21 PROCEDURE — 97110 THERAPEUTIC EXERCISES: CPT

## 2023-03-21 NOTE — FLOWSHEET NOTE
Outpatient Physical Therapy  [place]           [x] Phone: [phone]   Fax: [phone]  [place]           [] Phone: [phone]   Fax: [phone]        Physical Therapy Daily Treatment Note  Date:  3/21/2023    Patient Name:  Carlos [place]    :  1940  MRN: [id number]  Restrictions/Precautions: Restrictions/Precautions: None  Diagnosis:   Diagnosis: disequilibrium syndrome  Treatment Diagnosis:  impaired gait, impaired balance  Insurance/Certification information: [employer] Medicare, [address] [place] [address]  Referring Physician: Doris Hernandez NP   Next Doctor Visit:    Plan of care signed (Y/N):  Sent 3/9  Outcome Measure: LING/56, DGI: 20/, ABC: 81.25%  Visit# / total visits:   3/8  Pain level:      0/10       Goals:     Patient goals: improve balance  Short term goals  Time Frame for Short term goals:   See LTG  Long Term Goals  Time Frame for Long Term Goals: 8  Pt to have 50% subjective improvement in overall condition. Pt will improve DGI score to 22 or higher in order to improve safer gait with activities. Pt will improve marching on foam with EC to 30 seconds with no sway to show improvement in pt's vestibular function. Pt will improve SLS to 5 seconds with no LOB to improve safety with stair amb at home. Summary of Evaluation:  Assessment: Pt is an 74-year-old male who presents to PT with dysequilibrium following a bout of a-fib that occurred three years ago. Upon assessment, pt demonstrated impaired gait and impaired balance which limits pts ability to safely complete ADLs and IADLs indep. Objective testing showed impairments in both proprioception and vestibular function, leading to impaired balance. Pt would benefit from skilled PT to address current impairments, improve balance and gait, and reduce the risk of falls, future injury, or further decline. Subjective:   Pt arrives to tx session reporting 0/10 pain; denies falls since last visit.         Any changes in Ambulatory Summary

## 2023-03-23 ENCOUNTER — HOSPITAL ENCOUNTER (OUTPATIENT)
Dept: PHYSICAL THERAPY | Age: 83
Setting detail: THERAPIES SERIES
Discharge: HOME | End: 2023-03-23
Payer: MEDICARE

## 2023-03-23 PROCEDURE — 97112 NEUROMUSCULAR REEDUCATION: CPT

## 2023-03-23 NOTE — FLOWSHEET NOTE
Outpatient Physical Therapy  [place]           [x] Phone: [phone]   Fax: [phone]  [place]           [] Phone: [phone]   Fax: [phone]        Physical Therapy Daily Treatment Note  Date:  3/23/2023    Patient Name:  William Fields    :  1940  MRN: [id number]  Restrictions/Precautions: Restrictions/Precautions: None  Diagnosis:   Diagnosis: disequilibrium syndrome  Treatment Diagnosis:  impaired gait, impaired balance  Insurance/Certification information: [employer] Medicare, [address] [place] [address]  Referring Physician: Harvey Dean NP   Next Doctor Visit:    Plan of care signed (Y/N):  Sent 3/9; resent 3/22  Outcome Measure: LING/56, DGI: , ABC: 81.25%  Visit# / total visits:     Pain level:      0/10           Goals:     Patient goals: improve balance  Short term goals  Time Frame for Short term goals:   See LTG  Long Term Goals  Time Frame for Long Term Goals: 8  Pt to have 50% subjective improvement in overall condition. Reports 0% change at this time 3/23  Pt will improve DGI score to 22 or higher in order to improve safer gait with activities. Pt will improve marching on foam with EC to 30 seconds with no sway to show improvement in pt's vestibular function. Pt will improve SLS to 5 seconds with no LOB to improve safety with stair amb at home. Summary of Evaluation:  Assessment: Pt is an 73-year-old male who presents to PT with dysequilibrium following a bout of a-fib that occurred three years ago. Upon assessment, pt demonstrated impaired gait and impaired balance which limits pts ability to safely complete ADLs and IADLs indep. Objective testing showed impairments in both proprioception and vestibular function, leading to impaired balance. Pt would benefit from skilled PT to address current impairments, improve balance and gait, and reduce the risk of falls, future injury, or further decline.       Subjective:    Rupa Cohn arrives to therapy stating that he feels about the same

## 2023-03-25 ENCOUNTER — APPOINTMENT (OUTPATIENT)
Dept: CT IMAGING | Age: 83
End: 2023-03-25
Payer: MEDICARE

## 2023-03-25 ENCOUNTER — APPOINTMENT (OUTPATIENT)
Dept: GENERAL RADIOLOGY | Age: 83
End: 2023-03-25
Payer: MEDICARE

## 2023-03-25 ENCOUNTER — HOSPITAL ENCOUNTER (EMERGENCY)
Age: 83
Discharge: HOME | End: 2023-03-25
Payer: MEDICARE

## 2023-03-25 VITALS
TEMPERATURE: 98.1 F | SYSTOLIC BLOOD PRESSURE: 159 MMHG | HEART RATE: 64 BPM | DIASTOLIC BLOOD PRESSURE: 90 MMHG | OXYGEN SATURATION: 99 % | RESPIRATION RATE: 20 BRPM

## 2023-03-25 DIAGNOSIS — H53.2 DOUBLE VISION: Primary | ICD-10-CM

## 2023-03-25 LAB
ALBUMIN SERPL-MCNC: 4.1 GM/DL (ref 3.4–5)
ALP BLD-CCNC: 80 IU/L (ref 40–129)
ALT SERPL-CCNC: 27 U/L (ref 10–40)
ANION GAP SERPL CALCULATED.3IONS-SCNC: 13 MMOL/L (ref 4–16)
AST SERPL-CCNC: 31 IU/L (ref 15–37)
BASOPHILS ABSOLUTE: 0 K/CU MM
BASOPHILS RELATIVE PERCENT: 0.5 % (ref 0–1)
BILIRUB SERPL-MCNC: 0.6 MG/DL (ref 0–1)
BUN SERPL-MCNC: 26 MG/DL (ref 6–23)
CALCIUM SERPL-MCNC: 8.7 MG/DL (ref 8.3–10.6)
CHLORIDE BLD-SCNC: 108 MMOL/L (ref 99–110)
CO2: 21 MMOL/L (ref 21–32)
CREAT SERPL-MCNC: 1.2 MG/DL (ref 0.9–1.3)
DIFFERENTIAL TYPE: ABNORMAL
EOSINOPHILS ABSOLUTE: 0.2 K/CU MM
EOSINOPHILS RELATIVE PERCENT: 3.8 % (ref 0–3)
GFR SERPL CREATININE-BSD FRML MDRD: >60 ML/MIN/1.73M2
GLUCOSE SERPL-MCNC: 151 MG/DL (ref 70–99)
HCT VFR BLD CALC: 42.1 % (ref 42–52)
HEMOGLOBIN: 13.7 GM/DL (ref 13.5–18)
IMMATURE NEUTROPHIL %: 0.2 % (ref 0–0.43)
INR BLD: 1.04 INDEX
LYMPHOCYTES ABSOLUTE: 1.3 K/CU MM
LYMPHOCYTES RELATIVE PERCENT: 20.9 % (ref 24–44)
MCH RBC QN AUTO: 30.2 PG (ref 27–31)
MCHC RBC AUTO-ENTMCNC: 32.5 % (ref 32–36)
MCV RBC AUTO: 92.9 FL (ref 78–100)
MONOCYTES ABSOLUTE: 0.6 K/CU MM
MONOCYTES RELATIVE PERCENT: 10.3 % (ref 0–4)
NUCLEATED RBC %: 0 %
PDW BLD-RTO: 12.5 % (ref 11.7–14.9)
PLATELET # BLD: 140 K/CU MM (ref 140–440)
PMV BLD AUTO: 10.3 FL (ref 7.5–11.1)
POTASSIUM SERPL-SCNC: 4.6 MMOL/L (ref 3.5–5.1)
PRO-BNP: 273.8 PG/ML
PROTHROMBIN TIME: 13.4 SECONDS (ref 11.7–14.5)
RBC # BLD: 4.53 M/CU MM (ref 4.6–6.2)
SEGMENTED NEUTROPHILS ABSOLUTE COUNT: 3.9 K/CU MM
SEGMENTED NEUTROPHILS RELATIVE PERCENT: 64.3 % (ref 36–66)
SODIUM BLD-SCNC: 142 MMOL/L (ref 135–145)
T4 FREE SERPL-MCNC: 0.99 NG/DL (ref 0.9–1.8)
TOTAL IMMATURE NEUTOROPHIL: 0.01 K/CU MM
TOTAL NUCLEATED RBC: 0 K/CU MM
TOTAL PROTEIN: 6.3 GM/DL (ref 6.4–8.2)
TROPONIN T: <0.01 NG/ML
TSH SERPL DL<=0.005 MIU/L-ACNC: 1.36 UIU/ML (ref 0.27–4.2)
WBC # BLD: 6.1 K/CU MM (ref 4–10.5)

## 2023-03-25 PROCEDURE — 80053 COMPREHEN METABOLIC PANEL: CPT

## 2023-03-25 PROCEDURE — 93005 ELECTROCARDIOGRAM TRACING: CPT | Performed by: PHYSICIAN ASSISTANT

## 2023-03-25 PROCEDURE — 71045 X-RAY EXAM CHEST 1 VIEW: CPT

## 2023-03-25 PROCEDURE — 84484 ASSAY OF TROPONIN QUANT: CPT

## 2023-03-25 PROCEDURE — 85610 PROTHROMBIN TIME: CPT

## 2023-03-25 PROCEDURE — 70450 CT HEAD/BRAIN W/O DYE: CPT

## 2023-03-25 PROCEDURE — 99285 EMERGENCY DEPT VISIT HI MDM: CPT

## 2023-03-25 PROCEDURE — 70498 CT ANGIOGRAPHY NECK: CPT

## 2023-03-25 PROCEDURE — 84439 ASSAY OF FREE THYROXINE: CPT

## 2023-03-25 PROCEDURE — 6360000004 HC RX CONTRAST MEDICATION: Performed by: PHYSICIAN ASSISTANT

## 2023-03-25 PROCEDURE — 85025 COMPLETE CBC W/AUTO DIFF WBC: CPT

## 2023-03-25 PROCEDURE — 84443 ASSAY THYROID STIM HORMONE: CPT

## 2023-03-25 PROCEDURE — 83880 ASSAY OF NATRIURETIC PEPTIDE: CPT

## 2023-03-25 RX ADMIN — IOPAMIDOL 75 ML: 755 INJECTION, SOLUTION INTRAVENOUS at 18:56

## 2023-03-25 NOTE — ED PROVIDER NOTES
13:29,  Nonspecific T wave abnormality has replaced inverted T waves in Inferior leads         Radiographs:  CT HEAD WO CONTRAST    Result Date: 3/25/2023  EXAMINATION: CT OF THE HEAD WITHOUT CONTRAST  3/25/2023 3:10 pm TECHNIQUE: CT of the head was performed without the administration of intravenous contrast. Automated exposure control, iterative reconstruction, and/or weight based adjustment of the mA/kV was utilized to reduce the radiation dose to as low as reasonably achievable. COMPARISON: 02/08/2023 HISTORY: ORDERING SYSTEM PROVIDED HISTORY: double vision x 10 minutes, completely resolved TECHNOLOGIST PROVIDED HISTORY: Has a \"code stroke\" or \"stroke alert\" been called? ->No Reason for exam:->double vision x 10 minutes, completely resolved Decision Support Exception - unselect if not a suspected or confirmed emergency medical condition->Emergency Medical Condition (MA) Reason for Exam: double vision x 10 minutes, completely resolved FINDINGS: BRAIN/VENTRICLES:  No acute loss of the gray-white matter differentiation is identified to suggest acute or subacute infarct. No masses or hemorrhages within the brain parenchyma are found. No evidence of midline shift. There is mild periventricular low-attenuation, compatible with chronic small vessel ischemic disease. The intracranial vasculature, including the dural venous sinuses, is within normal limits. ORBITS: No acute orbital abnormalities are identified. SINUSES: Small air-fluid levels are seen within the axillary sinuses bilaterally. Mild mucosal thickening in the right maxillary sinus. Remaining paranasal sinuses and mastoid air cells are clear. SOFT TISSUES/SKULL: The calvarium is intact. Extracranial soft tissues are unremarkable. No acute intracranial abnormality. Small fluid levels in the maxillary sinuses bilaterally. Please correlate with any clinical evidence of acute and/or chronic sinusitis.      XR CHEST PORTABLE    Result Date: 3/25/2023  EXAMINATION: ONE XRAY VIEW OF THE CHEST 3/25/2023 5:31 pm COMPARISON: Chest x-ray February 8, 2023 HISTORY: ORDERING SYSTEM PROVIDED HISTORY: cp TECHNOLOGIST PROVIDED HISTORY: Reason for exam:->cp Reason for Exam:  chest pain Additional signs and symptoms: episode of double vision Relevant Medical/Surgical History: CAD, hx of A-fib FINDINGS: Linear atelectasis at the left lung base. No focal consolidation identified. There is no effusion or pneumothorax. The cardiomediastinal silhouette is without acute process. The osseous structures are without acute process. Linear atelectasis at the left lung base, otherwise no acute cardiopulmonary process identified. CTA HEAD NECK W CONTRAST    Result Date: 3/25/2023  EXAMINATION: CTA OF THE HEAD AND NECK WITH CONTRAST 3/25/2023 6:11 pm: TECHNIQUE: CTA of the head and neck was performed with the administration of intravenous contrast. Multiplanar reformatted images are provided for review. MIP images are provided for review. Stenosis of the internal carotid arteries measured using NASCET criteria. Automated exposure control, iterative reconstruction, and/or weight based adjustment of the mA/kV was utilized to reduce the radiation dose to as low as reasonably achievable. COMPARISON: 02/08/2023 HISTORY: ORDERING SYSTEM PROVIDED HISTORY: double vision x 10 minutes, completely resolved TECHNOLOGIST PROVIDED HISTORY: Reason for exam:->double vision x 10 minutes, completely resolved Has a \"code stroke\" or \"stroke alert\" been called? ->No Decision Support Exception - unselect if not a suspected or confirmed emergency medical condition->Emergency Medical Condition (MA) Reason for Exam: double vision x 10 minutes, completely resolved FINDINGS: CTA NECK: AORTIC ARCH/ARCH VESSELS: No dissection or arterial injury. No significant stenosis of the brachiocephalic or subclavian arteries.  CAROTID ARTERIES: Moderate stenosis of the right internal carotid artery

## 2023-03-25 NOTE — ED PROVIDER NOTES
12 lead EKG per my interpretation:  Normal Sinus Rhythm 77  Axis is   Normal  QTc is   445  There is no specific T wave changes appreciated. There is no specific ST wave changes appreciated.     Prior EKG to compare with was not available        María Wylie DO  03/25/23 1165

## 2023-03-26 LAB
EKG ATRIAL RATE: 77 BPM
EKG DIAGNOSIS: NORMAL
EKG P AXIS: 21 DEGREES
EKG P-R INTERVAL: 134 MS
EKG Q-T INTERVAL: 394 MS
EKG QRS DURATION: 94 MS
EKG QTC CALCULATION (BAZETT): 445 MS
EKG R AXIS: -20 DEGREES
EKG T AXIS: -22 DEGREES
EKG VENTRICULAR RATE: 77 BPM

## 2023-03-27 ENCOUNTER — HOSPITAL ENCOUNTER (OUTPATIENT)
Dept: PHYSICAL THERAPY | Age: 83
Discharge: HOME | End: 2023-03-27

## 2023-03-27 PROCEDURE — 93010 ELECTROCARDIOGRAM REPORT: CPT | Performed by: INTERNAL MEDICINE

## 2023-03-27 NOTE — FLOWSHEET NOTE
Physical Therapy  Cancellation/No-show Note  Patient Name:  Luz Marina Abarca  :  1940   Date:  3/27/2023  Cancelled visits to date: 1  No-shows to date: 0    For today's appointment patient:  [x]  Cancelled  []  Rescheduled appointment  []  No-show     Reason given by patient:  []  Patient ill  []  Conflicting appointment  []  No transportation    []  Conflict with work  []  No reason given  [x]  Other:     Comments:  OOT    Electronically signed by:  Merritt Barriga PTA    9:52 AM  3/27/2023

## 2023-03-30 ENCOUNTER — HOSPITAL ENCOUNTER (OUTPATIENT)
Dept: PHYSICAL THERAPY | Age: 83
Setting detail: THERAPIES SERIES
Discharge: HOME | End: 2023-03-30
Payer: MEDICARE

## 2023-03-30 PROCEDURE — 97112 NEUROMUSCULAR REEDUCATION: CPT

## 2023-03-30 NOTE — FLOWSHEET NOTE
rebounder    2# ball throw/catch x 10 + tandem stand x 10 ea foot in lead   PROPRIOCEPTION                                          MODALITIES                         Other Therapeutic Activities/Education:  HEP, safety at home , have UE assist available as needed      Home Exercise Program: standing marches, standing hip abduction, sit to stands  focus on end range hold for balance      Manual Treatments:  none      Modalities:  none      Communication with other providers:      Assessment:   impaired ankle reaction strategies, balance is fair requiring intermittent UE assist and SBA_CG from therapist.  Pt appears frustrated with lack of answers and progress. End session pain: 0/10       Plan for Next Session: cont dyn balance work.        Time In / Time Out:    1300/1340 = 40      Timed Code/Total Treatment Minutes:   3 neuro    Next Progress Note due:  10th visit      Plan of Care Interventions:  [x] Therapeutic Exercise  [] Modalities:  [x] Therapeutic Activity     [] Ultrasound  [] Estim  [x] Gait Training      [] Cervical Traction [] Lumbar Traction  [x] Neuromuscular Re-education    [] Cold/hotpack [] Iontophoresis   [x] Instruction in HEP      [] Vasopneumatic   [] Dry Needling    [x] Manual Therapy               [] Aquatic Therapy              Electronically signed by:  Tresa Walker PTA ,[phone]       3/30/2023, 1:13 PM

## 2023-04-03 ENCOUNTER — HOSPITAL ENCOUNTER (OUTPATIENT)
Dept: PHYSICAL THERAPY | Age: 83
Setting detail: THERAPIES SERIES
Discharge: HOME | End: 2023-04-03
Payer: MEDICARE

## 2023-04-03 PROCEDURE — 97112 NEUROMUSCULAR REEDUCATION: CPT

## 2023-04-03 NOTE — FLOWSHEET NOTE
[place] like his session was more challenging. Any changes in Ambulatory Summary Sheet? None      Objective:  COVID screening questions were asked and patient attested that there had been no contact or symptoms    R SLS: 10 seconds   L SLS: 8 seconds       Exercises: (No more than 4 columns)   Exercise/Equipment 3/23/2023 #4 3-30-23 4/3/23 #6           WARM UP                     TABLE            STANDING      marches -- X 10 ea, focus on 3 sec hold. States he has not been doing the hold with HEP. Hip abduction -- X 10 ea, focus on 3 sec hold. States he has not been doing the hold with HEP. Sit to stand --     Romberg on foam EO/EC  1/2 tandem EO/EC     Marching on foam EO/EC     Fwd/Retro Weight Shifts -- Ankle PF/DF x 10 ea, intermittent UE required    Step Ups Up and over laterally 6\" 10* ea      Cone taps Foam alt LE's     Ball pass w/ cone 1 lap fwd/bkwd     Tandem walking  1 lap  In P// bars, length x 3, intermittent UE taps for balance + karaoke L/R in p// bars. Both required close sba-cg  No // bars, tandem walk, karaoke x2 laps ea   Walking fwd  EC 1 lap     Walking with head turns  Horizontal/Vertical 1 lap ea  Standing on foam head turns x 6 ea. Makes dizziness worse with head turns. Ball bounce, ball throw + clap, fwd/bwd walking EC x2 laps ea   Retro walking  1 lap     SLS   Foam  Foam EC Foam EC 30\" x2   Wobble board   F/R and lateral taps x 10 ea , followed by 15 mid line holds. Required intermittent UE assist. Static hold in center 30\" x2   Foam strip fwd/lat  Standing on foam trunk rotation and arm swings x 10 ea. Foam trunk rotations, diagonal chops x10 ea dir   VOR cards on foam       Ball passes behind back standing on foam // bars EO/EC  Behind waist x 10, behind head x 10.  EO Behind the waist x10 ea dir in tandem on foam   rebounder  2# ball throw/catch x 10 + tandem stand x 10 ea foot in lead 2# ball tandem stance on airex x20 ea dir   BOSU   DLS T31\"  SLS G61\" ea side flat

## 2023-04-06 ENCOUNTER — HOSPITAL ENCOUNTER (OUTPATIENT)
Dept: PHYSICAL THERAPY | Age: 83
Setting detail: THERAPIES SERIES
Discharge: HOME | End: 2023-04-06
Payer: MEDICARE

## 2023-04-06 PROCEDURE — 97112 NEUROMUSCULAR REEDUCATION: CPT

## 2023-04-06 NOTE — FLOWSHEET NOTE
10, behind head x 10. EO Behind the waist x10 ea dir in tandem on foam    rebounder 2# ball throw/catch x 10 + tandem stand x 10 ea foot in lead 2# ball tandem stance on airex x20 ea dir    BOSU  DLS U88\"  SLS T66\" ea side flat side    Alt marches round side x20                                  MODALITIES                      Other Therapeutic Activities/Education:      Home Exercise Program: standing marches, standing hip abduction, sit to stands  focus on end range hold for balance      Manual Treatments:  none      Modalities:  none      Communication with other providers:      Assessment:   Killian Mcallister does well with most balance activities except with those requiring isolated vestibular function. End session dizziness: 0/10       Plan for Next Session: cont dyn balance work.        Time In / Time Out:    0945/1010      Timed Code/Total Treatment Minutes:   25' 2 NR     Next Progress Note due:  10th visit      Plan of Care Interventions:  [x] Therapeutic Exercise  [] Modalities:  [x] Therapeutic Activity     [] Ultrasound  [] Estim  [x] Gait Training      [] Cervical Traction [] Lumbar Traction  [x] Neuromuscular Re-education    [] Cold/hotpack [] Iontophoresis   [x] Instruction in HEP      [] Vasopneumatic   [] Dry Needling    [x] Manual Therapy               [] Aquatic Therapy              Electronically signed by:  Daly Carter ,PIA           4/6/2023, 8:39 AM

## 2023-04-28 ENCOUNTER — OFFICE VISIT (OUTPATIENT)
Dept: NEUROLOGY | Age: 83
End: 2023-04-28
Payer: MEDICARE

## 2023-04-28 VITALS
BODY MASS INDEX: 24.34 KG/M2 | DIASTOLIC BLOOD PRESSURE: 100 MMHG | WEIGHT: 170 LBS | OXYGEN SATURATION: 98 % | HEIGHT: 70 IN | SYSTOLIC BLOOD PRESSURE: 170 MMHG | HEART RATE: 59 BPM

## 2023-04-28 DIAGNOSIS — G47.33 OSA ON CPAP: ICD-10-CM

## 2023-04-28 DIAGNOSIS — G60.9 IDIOPATHIC PERIPHERAL NEUROPATHY: Primary | ICD-10-CM

## 2023-04-28 DIAGNOSIS — G44.229 CHRONIC TENSION-TYPE HEADACHE, NOT INTRACTABLE: ICD-10-CM

## 2023-04-28 DIAGNOSIS — Z99.89 OSA ON CPAP: ICD-10-CM

## 2023-04-28 DIAGNOSIS — I48.91 ATRIAL FIBRILLATION, UNSPECIFIED TYPE (HCC): ICD-10-CM

## 2023-04-28 DIAGNOSIS — E87.8 DISEQUILIBRIUM SYNDROME: ICD-10-CM

## 2023-04-28 PROCEDURE — 1123F ACP DISCUSS/DSCN MKR DOCD: CPT | Performed by: NURSE PRACTITIONER

## 2023-04-28 PROCEDURE — 99214 OFFICE O/P EST MOD 30 MIN: CPT | Performed by: NURSE PRACTITIONER

## 2023-04-28 PROCEDURE — 3080F DIAST BP >= 90 MM HG: CPT | Performed by: NURSE PRACTITIONER

## 2023-04-28 PROCEDURE — 3077F SYST BP >= 140 MM HG: CPT | Performed by: NURSE PRACTITIONER

## 2023-04-28 RX ORDER — TIZANIDINE HYDROCHLORIDE 2 MG/1
2 CAPSULE, GELATIN COATED ORAL NIGHTLY
Qty: 30 CAPSULE | Refills: 3 | Status: SHIPPED | OUTPATIENT
Start: 2023-04-28

## 2023-04-28 NOTE — PROGRESS NOTES
4/28/23    Juliette Span  1940    Chief Complaint   Patient presents with    Follow-up     Dizziness, double vision       History of Present Illness  Radha Han is a [de-identified] y.o. male presenting today for hospital follow-up on 3/25/23 of: \"double vision\" and dizziness that lasted approx 15 mins. --CT head and CTA head/neck are non-acute. He had a non acute brain MRI on 2/12/23. He remains on gabapentin 100 mg BID for bilateral stabbing pain in his temples. Today he tells me that this only happens once per month and it is not bothersome. His main issue today is that he has brain fog and pressure behind eyes everyday and he feels off balance. He denies falls. He denies sensitivity to light, sound, denies nausea, vomiting. He has not had any further double vision. He is frustrated today, he wants to know what is wrong with his brain. He tells me that he just saw an ophthalmologist and everything checked out okay. He wears his pap device every night. He has a history of AFib and is on Eliquis, sotalol, He takes statin as well. He was recently referred to neurosurgery for cervical spinal stenosis. He completed PT for gait and balance therapy. He has a history of peripheral neuropathy. He denies neuropathic pain. He has profound hearing loss and tinnitus. He has not had his hearing checked.   Current Outpatient Medications   Medication Sig Dispense Refill    tiZANidine (ZANAFLEX) 2 MG capsule Take 1 capsule by mouth nightly 30 capsule 3    CLONIDINE HCL PO Take by mouth as needed      midodrine (PROAMATINE) 5 MG tablet Take 1 tablet by mouth 2 times daily as needed (give sbp < 95) 60 tablet 0    azelastine (ASTELIN) 0.1 % nasal spray 1 spray by Nasal route 2 times daily Use in each nostril as directed      fluticasone (FLONASE) 50 MCG/ACT nasal spray 1 spray by Each Nostril route 2 times daily      acetaminophen (TYLENOL) 325 MG tablet Take 2 tablets by mouth as needed for Pain      Multiple

## 2023-05-15 ENCOUNTER — HOSPITAL ENCOUNTER (OUTPATIENT)
Age: 83
Discharge: HOME | End: 2023-05-15
Payer: MEDICARE

## 2023-05-15 PROCEDURE — 84154 ASSAY OF PSA FREE: CPT

## 2023-05-15 PROCEDURE — 84153 ASSAY OF PSA TOTAL: CPT

## 2023-05-15 PROCEDURE — 36415 COLL VENOUS BLD VENIPUNCTURE: CPT

## 2023-05-17 LAB
PSA FREE MFR SERPL: 25 %
PSA FREE SERPL-MCNC: 0.3 NG/ML
PSA SERPL IA-MCNC: 1.2 NG/ML (ref 0–4)

## 2023-05-22 RX ORDER — TIZANIDINE HYDROCHLORIDE 2 MG/1
CAPSULE, GELATIN COATED ORAL
Qty: 30 CAPSULE | Refills: 3 | OUTPATIENT
Start: 2023-05-22

## 2023-05-23 ENCOUNTER — HOSPITAL ENCOUNTER (OUTPATIENT)
Dept: MRI IMAGING | Age: 83
Discharge: HOME | End: 2023-05-23
Payer: MEDICARE

## 2023-05-23 DIAGNOSIS — M48.02 SPINAL STENOSIS IN CERVICAL REGION: ICD-10-CM

## 2023-05-23 PROCEDURE — 72141 MRI NECK SPINE W/O DYE: CPT

## 2023-07-28 ENCOUNTER — OFFICE VISIT (OUTPATIENT)
Dept: NEUROLOGY | Age: 83
End: 2023-07-28
Payer: MEDICARE

## 2023-07-28 VITALS
WEIGHT: 172 LBS | OXYGEN SATURATION: 98 % | BODY MASS INDEX: 24.62 KG/M2 | DIASTOLIC BLOOD PRESSURE: 70 MMHG | HEIGHT: 70 IN | HEART RATE: 58 BPM | SYSTOLIC BLOOD PRESSURE: 120 MMHG

## 2023-07-28 DIAGNOSIS — Z99.89 OSA ON CPAP: ICD-10-CM

## 2023-07-28 DIAGNOSIS — G47.33 OSA ON CPAP: ICD-10-CM

## 2023-07-28 DIAGNOSIS — E87.8 DISEQUILIBRIUM SYNDROME: ICD-10-CM

## 2023-07-28 DIAGNOSIS — I48.91 ATRIAL FIBRILLATION, UNSPECIFIED TYPE (HCC): ICD-10-CM

## 2023-07-28 DIAGNOSIS — G60.9 IDIOPATHIC PERIPHERAL NEUROPATHY: Primary | ICD-10-CM

## 2023-07-28 DIAGNOSIS — G43.709 CHRONIC MIGRAINE WITHOUT AURA WITHOUT STATUS MIGRAINOSUS, NOT INTRACTABLE: ICD-10-CM

## 2023-07-28 PROCEDURE — 1123F ACP DISCUSS/DSCN MKR DOCD: CPT | Performed by: NURSE PRACTITIONER

## 2023-07-28 PROCEDURE — 3078F DIAST BP <80 MM HG: CPT | Performed by: NURSE PRACTITIONER

## 2023-07-28 PROCEDURE — 3074F SYST BP LT 130 MM HG: CPT | Performed by: NURSE PRACTITIONER

## 2023-07-28 PROCEDURE — 99214 OFFICE O/P EST MOD 30 MIN: CPT | Performed by: NURSE PRACTITIONER

## 2023-07-28 RX ORDER — ATOGEPANT 60 MG/1
TABLET ORAL
Qty: 8 TABLET | Refills: 0 | COMMUNITY
Start: 2023-07-28

## 2023-07-28 RX ORDER — ATOGEPANT 60 MG/1
60 TABLET ORAL DAILY
Qty: 30 TABLET | Refills: 3 | Status: SHIPPED | OUTPATIENT
Start: 2023-07-28

## 2023-07-28 RX ORDER — METHYLPREDNISOLONE 4 MG/1
TABLET ORAL
Qty: 1 KIT | Refills: 0 | Status: SHIPPED | OUTPATIENT
Start: 2023-07-28

## 2023-08-18 ENCOUNTER — HOSPITAL ENCOUNTER (OUTPATIENT)
Age: 83
Discharge: HOME | End: 2023-08-18
Payer: MEDICARE

## 2023-08-18 DIAGNOSIS — G47.33 OSA ON CPAP: ICD-10-CM

## 2023-08-18 DIAGNOSIS — Z98.890 S/P ABLATION OF ATRIAL FIBRILLATION: ICD-10-CM

## 2023-08-18 DIAGNOSIS — F41.9 ANXIETY: ICD-10-CM

## 2023-08-18 DIAGNOSIS — N18.2 CHRONIC KIDNEY DISEASE, STAGE II (MILD): ICD-10-CM

## 2023-08-18 DIAGNOSIS — Z99.89 OSA ON CPAP: ICD-10-CM

## 2023-08-18 DIAGNOSIS — Z86.79 S/P ABLATION OF ATRIAL FIBRILLATION: ICD-10-CM

## 2023-08-18 DIAGNOSIS — I10 PRIMARY HYPERTENSION: ICD-10-CM

## 2023-08-18 LAB
ALBUMIN SERPL-MCNC: 4.3 GM/DL (ref 3.4–5)
ANION GAP SERPL CALCULATED.3IONS-SCNC: 8 MMOL/L (ref 4–16)
BILIRUBIN URINE: NEGATIVE MG/DL
BLOOD, URINE: NEGATIVE
BUN SERPL-MCNC: 26 MG/DL (ref 6–23)
CALCIUM SERPL-MCNC: 9.5 MG/DL (ref 8.3–10.6)
CHLORIDE BLD-SCNC: 107 MMOL/L (ref 99–110)
CLARITY: CLEAR
CO2: 25 MMOL/L (ref 21–32)
COLOR: YELLOW
COMMENT UA: NORMAL
CREAT SERPL-MCNC: 1.6 MG/DL (ref 0.9–1.3)
CREATININE URINE: 90.2 MG/DL (ref 39–259)
GFR SERPL CREATININE-BSD FRML MDRD: 43 ML/MIN/1.73M2
GLUCOSE SERPL-MCNC: 113 MG/DL (ref 70–99)
GLUCOSE, URINE: NEGATIVE MG/DL
KETONES, URINE: NEGATIVE MG/DL
LEUKOCYTE ESTERASE, URINE: NEGATIVE
MAGNESIUM: 2.3 MG/DL (ref 1.8–2.4)
NITRITE URINE, QUANTITATIVE: NEGATIVE
PH, URINE: 6.5 (ref 5–8)
PHOSPHORUS: 3.4 MG/DL (ref 2.5–4.9)
POTASSIUM SERPL-SCNC: 5.2 MMOL/L (ref 3.5–5.1)
PROT/CREAT RATIO, UR: 0.1
PROTEIN UA: NEGATIVE MG/DL
SODIUM BLD-SCNC: 140 MMOL/L (ref 135–145)
SPECIFIC GRAVITY UA: 1.01 (ref 1–1.03)
URINE TOTAL PROTEIN: 8.9 MG/DL
UROBILINOGEN, URINE: 0.2 MG/DL (ref 0.2–1)

## 2023-08-18 PROCEDURE — 82040 ASSAY OF SERUM ALBUMIN: CPT

## 2023-08-18 PROCEDURE — 36415 COLL VENOUS BLD VENIPUNCTURE: CPT

## 2023-08-18 PROCEDURE — 80048 BASIC METABOLIC PNL TOTAL CA: CPT

## 2023-08-18 PROCEDURE — 83735 ASSAY OF MAGNESIUM: CPT

## 2023-08-18 PROCEDURE — 81003 URINALYSIS AUTO W/O SCOPE: CPT

## 2023-08-18 PROCEDURE — 84156 ASSAY OF PROTEIN URINE: CPT

## 2023-08-18 PROCEDURE — 84100 ASSAY OF PHOSPHORUS: CPT

## 2023-08-18 PROCEDURE — 82570 ASSAY OF URINE CREATININE: CPT

## 2023-08-25 RX ORDER — TIZANIDINE HYDROCHLORIDE 2 MG/1
CAPSULE, GELATIN COATED ORAL
Qty: 90 CAPSULE | Refills: 1 | OUTPATIENT
Start: 2023-08-25

## 2023-10-10 ENCOUNTER — HOSPITAL ENCOUNTER (OUTPATIENT)
Age: 83
Discharge: HOME | End: 2023-10-10
Payer: MEDICARE

## 2023-10-10 DIAGNOSIS — Z98.890 S/P ABLATION OF ATRIAL FIBRILLATION: ICD-10-CM

## 2023-10-10 DIAGNOSIS — N18.31 STAGE 3A CHRONIC KIDNEY DISEASE (HCC): ICD-10-CM

## 2023-10-10 DIAGNOSIS — Z86.79 S/P ABLATION OF ATRIAL FIBRILLATION: ICD-10-CM

## 2023-10-10 DIAGNOSIS — I10 PRIMARY HYPERTENSION: ICD-10-CM

## 2023-10-10 DIAGNOSIS — G62.9 NEUROPATHY: ICD-10-CM

## 2023-10-10 DIAGNOSIS — F41.9 ANXIETY: ICD-10-CM

## 2023-10-10 LAB
ALBUMIN SERPL-MCNC: 4.3 GM/DL (ref 3.4–5)
ANION GAP SERPL CALCULATED.3IONS-SCNC: 9 MMOL/L (ref 4–16)
BASOPHILS ABSOLUTE: 0.1 K/CU MM
BASOPHILS RELATIVE PERCENT: 0.8 % (ref 0–1)
BILIRUBIN URINE: NEGATIVE MG/DL
BLOOD, URINE: NEGATIVE
BUN SERPL-MCNC: 22 MG/DL (ref 6–23)
CALCIUM SERPL-MCNC: 9.5 MG/DL (ref 8.3–10.6)
CHLORIDE BLD-SCNC: 103 MMOL/L (ref 99–110)
CLARITY: CLEAR
CO2: 24 MMOL/L (ref 21–32)
COLOR: YELLOW
COMMENT UA: NORMAL
CREAT SERPL-MCNC: 1.4 MG/DL (ref 0.9–1.3)
CREATININE URINE: 24.3 MG/DL (ref 39–259)
DIFFERENTIAL TYPE: ABNORMAL
EOSINOPHILS ABSOLUTE: 0.3 K/CU MM
EOSINOPHILS RELATIVE PERCENT: 4.4 % (ref 0–3)
GFR SERPL CREATININE-BSD FRML MDRD: 50 ML/MIN/1.73M2
GLUCOSE SERPL-MCNC: 80 MG/DL (ref 70–99)
GLUCOSE, URINE: NEGATIVE MG/DL
HCT VFR BLD CALC: 42.3 % (ref 42–52)
HEMOGLOBIN: 13.6 GM/DL (ref 13.5–18)
IMMATURE NEUTROPHIL %: 0.5 % (ref 0–0.43)
KETONES, URINE: NEGATIVE MG/DL
LEUKOCYTE ESTERASE, URINE: NEGATIVE
LYMPHOCYTES ABSOLUTE: 1.3 K/CU MM
LYMPHOCYTES RELATIVE PERCENT: 21 % (ref 24–44)
MCH RBC QN AUTO: 30.7 PG (ref 27–31)
MCHC RBC AUTO-ENTMCNC: 32.2 % (ref 32–36)
MCV RBC AUTO: 95.5 FL (ref 78–100)
MONOCYTES ABSOLUTE: 0.8 K/CU MM
MONOCYTES RELATIVE PERCENT: 12.6 % (ref 0–4)
NITRITE URINE, QUANTITATIVE: NEGATIVE
NUCLEATED RBC %: 0 %
PDW BLD-RTO: 12.6 % (ref 11.7–14.9)
PH, URINE: 6 (ref 5–8)
PHOSPHORUS: 3.9 MG/DL (ref 2.5–4.9)
PLATELET # BLD: 167 K/CU MM (ref 140–440)
PMV BLD AUTO: 10.9 FL (ref 7.5–11.1)
POTASSIUM SERPL-SCNC: 5.2 MMOL/L (ref 3.5–5.1)
PROT/CREAT RATIO, UR: 0.2
PROTEIN UA: NEGATIVE MG/DL
RBC # BLD: 4.43 M/CU MM (ref 4.6–6.2)
SEGMENTED NEUTROPHILS ABSOLUTE COUNT: 3.9 K/CU MM
SEGMENTED NEUTROPHILS RELATIVE PERCENT: 60.7 % (ref 36–66)
SODIUM BLD-SCNC: 136 MMOL/L (ref 135–145)
SPECIFIC GRAVITY UA: <1.005 (ref 1–1.03)
TOTAL IMMATURE NEUTOROPHIL: 0.03 K/CU MM
TOTAL NUCLEATED RBC: 0 K/CU MM
URINE TOTAL PROTEIN: 4 MG/DL
UROBILINOGEN, URINE: 0.2 MG/DL (ref 0.2–1)
WBC # BLD: 6.4 K/CU MM (ref 4–10.5)

## 2023-10-10 PROCEDURE — 84100 ASSAY OF PHOSPHORUS: CPT

## 2023-10-10 PROCEDURE — 85025 COMPLETE CBC W/AUTO DIFF WBC: CPT

## 2023-10-10 PROCEDURE — 81003 URINALYSIS AUTO W/O SCOPE: CPT

## 2023-10-10 PROCEDURE — 82040 ASSAY OF SERUM ALBUMIN: CPT

## 2023-10-10 PROCEDURE — 36415 COLL VENOUS BLD VENIPUNCTURE: CPT

## 2023-10-10 PROCEDURE — 82570 ASSAY OF URINE CREATININE: CPT

## 2023-10-10 PROCEDURE — 80048 BASIC METABOLIC PNL TOTAL CA: CPT

## 2023-10-10 PROCEDURE — 84156 ASSAY OF PROTEIN URINE: CPT

## 2023-10-27 ENCOUNTER — OFFICE VISIT (OUTPATIENT)
Dept: NEUROLOGY | Age: 83
End: 2023-10-27
Payer: MEDICARE

## 2023-10-27 VITALS
HEART RATE: 66 BPM | WEIGHT: 174 LBS | HEIGHT: 70 IN | OXYGEN SATURATION: 98 % | BODY MASS INDEX: 24.91 KG/M2 | SYSTOLIC BLOOD PRESSURE: 114 MMHG | DIASTOLIC BLOOD PRESSURE: 78 MMHG

## 2023-10-27 DIAGNOSIS — E87.8 DISEQUILIBRIUM SYNDROME: ICD-10-CM

## 2023-10-27 DIAGNOSIS — G60.9 IDIOPATHIC PERIPHERAL NEUROPATHY: Primary | ICD-10-CM

## 2023-10-27 DIAGNOSIS — G44.229 CHRONIC TENSION-TYPE HEADACHE, NOT INTRACTABLE: ICD-10-CM

## 2023-10-27 PROCEDURE — 99214 OFFICE O/P EST MOD 30 MIN: CPT | Performed by: NURSE PRACTITIONER

## 2023-10-27 PROCEDURE — 3078F DIAST BP <80 MM HG: CPT | Performed by: NURSE PRACTITIONER

## 2023-10-27 PROCEDURE — 1123F ACP DISCUSS/DSCN MKR DOCD: CPT | Performed by: NURSE PRACTITIONER

## 2023-10-27 PROCEDURE — 3074F SYST BP LT 130 MM HG: CPT | Performed by: NURSE PRACTITIONER

## 2023-10-27 NOTE — PROGRESS NOTES
10/27/23    Darrius Men  1940    Chief Complaint   Patient presents with    Follow-up     Migraines, neuropathy       History of Present Illness  Ina Young is a 82 y.o. male presenting today for follow-up of: Disequilibrium, pressure behind his eyes. We have tried and failed several medications for pressure behind the eyes and low-grade headache including scopolamine patch, tizanidine, gabapentin and even Chichi Lint. Nothing helps. He has seen ophthalmology, he is now been referred to neuro-ophthalmology in [facility]. He sees them soon to evaluate the pressure behind his eyes. He has tried vestibular and balance therapy in the past which was helpful, but he does not want further therapy. He is not falling. EMG of the lower extremities was evaluated to rule out neuropathy causing sensory ataxia, it showed mild to moderate peripheral neuropathy. MRI brain, CTA head and neck nonacute. Was referred to neurosurgery for MRI C-spine findings, no intervention was needed. He follows with ENT for sinusitis. He uses a nasal spray. Dr. Clarisa Holstein did recommend hearing aids after reviewing audiometric findings. He is not wearing hearing aids.     Current Outpatient Medications   Medication Sig Dispense Refill    amLODIPine (NORVASC) 2.5 MG tablet TAKE 1 TABLET BY MOUTH EVERY DAY 30 tablet 5    rOPINIRole (REQUIP) 1 MG tablet Take 1 tablet by mouth 3 times daily (Patient taking differently: Take 1 tablet by mouth daily) 90 tablet 3    spironolactone (ALDACTONE) 25 MG tablet Take 1 tablet by mouth daily 90 tablet 3    midodrine (PROAMATINE) 5 MG tablet Take 1 tablet by mouth 2 times daily as needed (give sbp < 95) 60 tablet 0    azelastine (ASTELIN) 0.1 % nasal spray 1 spray by Nasal route 2 times daily Use in each nostril as directed      fluticasone (FLONASE) 50 MCG/ACT nasal spray 1 spray by Each Nostril route 2 times daily      acetaminophen (TYLENOL) 325 MG tablet Take 2 tablets by mouth as needed for Pain

## 2024-02-12 ENCOUNTER — HOSPITAL ENCOUNTER (OUTPATIENT)
Age: 84
Discharge: HOME | End: 2024-02-12
Payer: MEDICARE

## 2024-02-12 DIAGNOSIS — F41.9 ANXIETY: ICD-10-CM

## 2024-02-12 DIAGNOSIS — I10 PRIMARY HYPERTENSION: ICD-10-CM

## 2024-02-12 DIAGNOSIS — Z86.79 S/P ABLATION OF ATRIAL FIBRILLATION: ICD-10-CM

## 2024-02-12 DIAGNOSIS — N18.31 STAGE 3A CHRONIC KIDNEY DISEASE (HCC): ICD-10-CM

## 2024-02-12 DIAGNOSIS — Z98.890 S/P ABLATION OF ATRIAL FIBRILLATION: ICD-10-CM

## 2024-02-12 LAB
ALBUMIN SERPL-MCNC: 4.3 GM/DL (ref 3.4–5)
ANION GAP SERPL CALCULATED.3IONS-SCNC: 12 MMOL/L (ref 7–16)
BASOPHILS ABSOLUTE: 0 K/CU MM
BASOPHILS RELATIVE PERCENT: 0.6 % (ref 0–1)
BILIRUBIN URINE: NEGATIVE MG/DL
BLOOD, URINE: NEGATIVE
BUN SERPL-MCNC: 28 MG/DL (ref 6–23)
CALCIUM SERPL-MCNC: 9.2 MG/DL (ref 8.3–10.6)
CHLORIDE BLD-SCNC: 99 MMOL/L (ref 99–110)
CLARITY: CLEAR
COLOR: YELLOW
COMMENT UA: NORMAL
CREAT SERPL-MCNC: 1.5 MG/DL (ref 0.9–1.3)
CREATININE URINE: 34.5 MG/DL (ref 39–259)
DIFFERENTIAL TYPE: ABNORMAL
EOSINOPHILS ABSOLUTE: 0.3 K/CU MM
EOSINOPHILS RELATIVE PERCENT: 4.5 % (ref 0–3)
GFR SERPL CREATININE-BSD FRML MDRD: 46 ML/MIN/1.73M2
GLUCOSE SERPL-MCNC: 91 MG/DL (ref 70–99)
GLUCOSE, URINE: NEGATIVE MG/DL
HCT VFR BLD CALC: 46.5 % (ref 42–52)
HEMOGLOBIN: 14.6 GM/DL (ref 13.5–18)
IMMATURE NEUTROPHIL %: 0.4 % (ref 0–0.43)
KETONES, URINE: NEGATIVE MG/DL
LEUKOCYTE ESTERASE, URINE: NEGATIVE
LYMPHOCYTES ABSOLUTE: 1.4 K/CU MM
LYMPHOCYTES RELATIVE PERCENT: 20.3 % (ref 24–44)
MAGNESIUM: 2.4 MG/DL (ref 1.8–2.4)
MCH RBC QN AUTO: 29.9 PG (ref 27–31)
MCHC RBC AUTO-ENTMCNC: 31.4 % (ref 32–36)
MCV RBC AUTO: 95.3 FL (ref 78–100)
MONOCYTES ABSOLUTE: 0.7 K/CU MM
MONOCYTES RELATIVE PERCENT: 11 % (ref 0–4)
NITRITE URINE, QUANTITATIVE: NEGATIVE
NUCLEATED RBC %: 0 %
PDW BLD-RTO: 12.5 % (ref 11.7–14.9)
PH, URINE: 6 (ref 5–8)
PHOSPHORUS: 4.1 MG/DL (ref 2.5–4.9)
PLATELET # BLD: 150 K/CU MM (ref 140–440)
PMV BLD AUTO: 10.6 FL (ref 7.5–11.1)
POTASSIUM SERPL-SCNC: 5.7 MMOL/L (ref 3.5–5.1)
PROT/CREAT RATIO, UR: 0.1
PROTEIN UA: NEGATIVE MG/DL
RBC # BLD: 4.88 M/CU MM (ref 4.6–6.2)
SEGMENTED NEUTROPHILS ABSOLUTE COUNT: 4.3 K/CU MM
SEGMENTED NEUTROPHILS RELATIVE PERCENT: 63.2 % (ref 36–66)
SODIUM BLD-SCNC: 132 MMOL/L (ref 135–145)
SPECIFIC GRAVITY UA: 1.01 (ref 1–1.03)
TOTAL IMMATURE NEUTOROPHIL: 0.03 K/CU MM
TOTAL NUCLEATED RBC: 0 K/CU MM
URINE TOTAL PROTEIN: 4 MG/DL
UROBILINOGEN, URINE: 0.2 MG/DL (ref 0.2–1)
WBC # BLD: 6.7 K/CU MM (ref 4–10.5)

## 2024-02-12 PROCEDURE — 81003 URINALYSIS AUTO W/O SCOPE: CPT

## 2024-02-12 PROCEDURE — 84100 ASSAY OF PHOSPHORUS: CPT

## 2024-02-12 PROCEDURE — 36415 COLL VENOUS BLD VENIPUNCTURE: CPT

## 2024-02-12 PROCEDURE — 80048 BASIC METABOLIC PNL TOTAL CA: CPT

## 2024-02-12 PROCEDURE — 84156 ASSAY OF PROTEIN URINE: CPT

## 2024-02-12 PROCEDURE — 82570 ASSAY OF URINE CREATININE: CPT

## 2024-02-12 PROCEDURE — 83735 ASSAY OF MAGNESIUM: CPT

## 2024-02-12 PROCEDURE — 85025 COMPLETE CBC W/AUTO DIFF WBC: CPT

## 2024-02-12 PROCEDURE — 82040 ASSAY OF SERUM ALBUMIN: CPT

## 2024-02-20 PROBLEM — E87.5 HYPERKALEMIA: Status: ACTIVE | Noted: 2024-02-20

## 2024-05-23 ENCOUNTER — HOSPITAL ENCOUNTER (OUTPATIENT)
Age: 84
Discharge: HOME | End: 2024-05-23
Payer: MEDICARE

## 2024-05-23 DIAGNOSIS — G47.33 OSA ON CPAP: ICD-10-CM

## 2024-05-23 DIAGNOSIS — Z86.79 S/P ABLATION OF ATRIAL FIBRILLATION: ICD-10-CM

## 2024-05-23 DIAGNOSIS — N18.31 STAGE 3A CHRONIC KIDNEY DISEASE (HCC): ICD-10-CM

## 2024-05-23 DIAGNOSIS — I10 PRIMARY HYPERTENSION: ICD-10-CM

## 2024-05-23 DIAGNOSIS — F41.9 ANXIETY: ICD-10-CM

## 2024-05-23 DIAGNOSIS — E87.5 HYPERKALEMIA: ICD-10-CM

## 2024-05-23 DIAGNOSIS — Z98.890 S/P ABLATION OF ATRIAL FIBRILLATION: ICD-10-CM

## 2024-05-23 LAB
ALBUMIN SERPL-MCNC: 4.3 GM/DL (ref 3.4–5)
ANION GAP SERPL CALCULATED.3IONS-SCNC: 10 MMOL/L (ref 7–16)
BASOPHILS ABSOLUTE: 0 K/CU MM
BASOPHILS RELATIVE PERCENT: 0.4 % (ref 0–1)
BILIRUBIN, URINE: NEGATIVE MG/DL
BLOOD, URINE: NEGATIVE
BUN SERPL-MCNC: 21 MG/DL (ref 6–23)
CALCIUM SERPL-MCNC: 8.9 MG/DL (ref 8.3–10.6)
CHLORIDE BLD-SCNC: 108 MMOL/L (ref 99–110)
CLARITY: CLEAR
CO2: 24 MMOL/L (ref 21–32)
COLOR: YELLOW
COMMENT UA: NORMAL
CREAT SERPL-MCNC: 1.3 MG/DL (ref 0.9–1.3)
CREATININE URINE: 80.5 MG/DL (ref 39–259)
DIFFERENTIAL TYPE: ABNORMAL
EOSINOPHILS ABSOLUTE: 0.2 K/CU MM
EOSINOPHILS RELATIVE PERCENT: 4.9 % (ref 0–3)
GFR, ESTIMATED: 55 ML/MIN/1.73M2
GLUCOSE SERPL-MCNC: 95 MG/DL (ref 70–99)
GLUCOSE URINE: NEGATIVE MG/DL
HCT VFR BLD CALC: 45.2 % (ref 42–52)
HEMOGLOBIN: 14.3 GM/DL (ref 13.5–18)
IMMATURE NEUTROPHIL %: 0.2 % (ref 0–0.43)
KETONES, URINE: NEGATIVE MG/DL
LEUKOCYTE ESTERASE, URINE: NEGATIVE
LYMPHOCYTES ABSOLUTE: 1.4 K/CU MM
LYMPHOCYTES RELATIVE PERCENT: 29.1 % (ref 24–44)
MAGNESIUM: 2.3 MG/DL (ref 1.8–2.4)
MCH RBC QN AUTO: 30 PG (ref 27–31)
MCHC RBC AUTO-ENTMCNC: 31.6 % (ref 32–36)
MCV RBC AUTO: 95 FL (ref 78–100)
MONOCYTES ABSOLUTE: 0.6 K/CU MM
MONOCYTES RELATIVE PERCENT: 12.2 % (ref 0–4)
NEUTROPHILS ABSOLUTE: 2.6 K/CU MM
NEUTROPHILS RELATIVE PERCENT: 53.2 % (ref 36–66)
NITRITE URINE, QUANTITATIVE: NEGATIVE
NUCLEATED RBC %: 0 %
PDW BLD-RTO: 12.1 % (ref 11.7–14.9)
PH, URINE: 6.5 (ref 5–8)
PHOSPHORUS: 3.8 MG/DL (ref 2.5–4.9)
PLATELET # BLD: 144 K/CU MM (ref 140–440)
PMV BLD AUTO: 11.3 FL (ref 7.5–11.1)
POTASSIUM SERPL-SCNC: 4.7 MMOL/L (ref 3.5–5.1)
PROT/CREAT RATIO, UR: 0.1
PROTEIN UA: NEGATIVE MG/DL
RBC # BLD: 4.76 M/CU MM (ref 4.6–6.2)
SODIUM BLD-SCNC: 142 MMOL/L (ref 135–145)
SPECIFIC GRAVITY UA: 1.01 (ref 1–1.03)
TOTAL IMMATURE NEUTOROPHIL: 0.01 K/CU MM
TOTAL NUCLEATED RBC: 0 K/CU MM
URINE TOTAL PROTEIN: 9.5 MG/DL
UROBILINOGEN, URINE: 0.2 MG/DL (ref 0.2–1)
WBC # BLD: 4.9 K/CU MM (ref 4–10.5)

## 2024-05-23 PROCEDURE — 80048 BASIC METABOLIC PNL TOTAL CA: CPT

## 2024-05-23 PROCEDURE — 83735 ASSAY OF MAGNESIUM: CPT

## 2024-05-23 PROCEDURE — 81003 URINALYSIS AUTO W/O SCOPE: CPT

## 2024-05-23 PROCEDURE — 82570 ASSAY OF URINE CREATININE: CPT

## 2024-05-23 PROCEDURE — 84156 ASSAY OF PROTEIN URINE: CPT

## 2024-05-23 PROCEDURE — 84100 ASSAY OF PHOSPHORUS: CPT

## 2024-05-23 PROCEDURE — 36415 COLL VENOUS BLD VENIPUNCTURE: CPT

## 2024-05-23 PROCEDURE — 82040 ASSAY OF SERUM ALBUMIN: CPT

## 2024-05-23 PROCEDURE — 85025 COMPLETE CBC W/AUTO DIFF WBC: CPT

## 2024-06-13 ENCOUNTER — OFFICE VISIT (OUTPATIENT)
Dept: NEUROLOGY | Age: 84
End: 2024-06-13
Payer: MEDICARE

## 2024-06-13 VITALS
BODY MASS INDEX: 25.91 KG/M2 | OXYGEN SATURATION: 99 % | DIASTOLIC BLOOD PRESSURE: 92 MMHG | WEIGHT: 180.6 LBS | HEART RATE: 61 BPM | SYSTOLIC BLOOD PRESSURE: 166 MMHG

## 2024-06-13 DIAGNOSIS — G44.229 CHRONIC TENSION-TYPE HEADACHE, NOT INTRACTABLE: ICD-10-CM

## 2024-06-13 DIAGNOSIS — E87.8 DISEQUILIBRIUM SYNDROME: ICD-10-CM

## 2024-06-13 DIAGNOSIS — G60.9 IDIOPATHIC PERIPHERAL NEUROPATHY: Primary | ICD-10-CM

## 2024-06-13 DIAGNOSIS — G47.33 OSA ON CPAP: ICD-10-CM

## 2024-06-13 PROCEDURE — 3077F SYST BP >= 140 MM HG: CPT | Performed by: NURSE PRACTITIONER

## 2024-06-13 PROCEDURE — 1036F TOBACCO NON-USER: CPT | Performed by: NURSE PRACTITIONER

## 2024-06-13 PROCEDURE — 99213 OFFICE O/P EST LOW 20 MIN: CPT | Performed by: NURSE PRACTITIONER

## 2024-06-13 PROCEDURE — 3080F DIAST BP >= 90 MM HG: CPT | Performed by: NURSE PRACTITIONER

## 2024-06-13 PROCEDURE — 1123F ACP DISCUSS/DSCN MKR DOCD: CPT | Performed by: NURSE PRACTITIONER

## 2024-06-13 PROCEDURE — G8419 CALC BMI OUT NRM PARAM NOF/U: HCPCS | Performed by: NURSE PRACTITIONER

## 2024-06-13 PROCEDURE — G8427 DOCREV CUR MEDS BY ELIG CLIN: HCPCS | Performed by: NURSE PRACTITIONER

## 2024-06-13 NOTE — PROGRESS NOTES
6/13/24    Matthew Choi  1940    Chief Complaint   Patient presents with    Follow-up     6m follow up for Idiopathic peripheral neuropathy. Balance issues.        History of Present Illness  Matthew is a 83 y.o. male presenting today for follow-up of: Disequilibrium, pressure behind his eyes.    We have tried and failed several medications for pressure behind the eyes and low-grade headache including scopolamine patch, tizanidine, gabapentin and even Qulipta.  Nothing helps.     He has seen ophthalmology, he is now been referred to neuro-ophthalmology in [place].  He saw Dr. Burgess to evaluate the pressure behind his eyes-he tells me he was started on nortriptyline which has been effective, his symptoms have improved.     He has tried vestibular and balance therapy in the past which was helpful, but he does not want further therapy.He is not falling.     EMG of the lower extremities was evaluated to rule out neuropathy causing sensory ataxia, it showed mild to moderate peripheral neuropathy.     MRI brain, CTA head and neck nonacute. Was referred to neurosurgery for MRI C-spine findings, no intervention was needed.     He follows with ENT for sinusitis.  He uses a nasal spray.  Dr. Mendoza did recommend hearing aids after reviewing audiometric findings.      He is compliant with his CPAP      Current Outpatient Medications   Medication Sig Dispense Refill    vitamin D (CHOLECALCIFEROL) 25 MCG (1000 UT) TABS tablet Take 1 tablet by mouth daily      nortriptyline (PAMELOR) 25 MG capsule Take 1 capsule by mouth nightly      rOPINIRole (REQUIP) 1 MG tablet TAKE 1 TABLET BY MOUTH THREE TIMES A DAY (Patient taking differently: Take 1 tablet by mouth daily) 270 tablet 1    azelastine (ASTELIN) 0.1 % nasal spray 1 spray by Nasal route 2 times daily Use in each nostril as directed      fluticasone (FLONASE) 50 MCG/ACT nasal spray 1 spray by Each Nostril route 2 times daily      Multiple Vitamins-Minerals (MULTIVITAMIN

## 2024-07-18 ENCOUNTER — HOSPITAL ENCOUNTER (OUTPATIENT)
Dept: MRI IMAGING | Age: 84
Discharge: HOME | End: 2024-07-18
Attending: ORTHOPAEDIC SURGERY
Payer: MEDICARE

## 2024-07-18 DIAGNOSIS — M25.561 RIGHT KNEE PAIN, UNSPECIFIED CHRONICITY: ICD-10-CM

## 2024-07-18 PROCEDURE — 73721 MRI JNT OF LWR EXTRE W/O DYE: CPT

## 2024-07-20 ENCOUNTER — HOSPITAL ENCOUNTER (EMERGENCY)
Age: 84
Discharge: HOME | End: 2024-07-20
Attending: STUDENT IN AN ORGANIZED HEALTH CARE EDUCATION/TRAINING PROGRAM
Payer: MEDICARE

## 2024-07-20 VITALS
HEART RATE: 80 BPM | DIASTOLIC BLOOD PRESSURE: 85 MMHG | OXYGEN SATURATION: 97 % | SYSTOLIC BLOOD PRESSURE: 150 MMHG | RESPIRATION RATE: 17 BRPM | TEMPERATURE: 97.9 F

## 2024-07-20 DIAGNOSIS — I10 HYPERTENSION, UNSPECIFIED TYPE: Primary | ICD-10-CM

## 2024-07-20 LAB
ALBUMIN SERPL-MCNC: 3.9 GM/DL (ref 3.4–5)
ALP BLD-CCNC: 78 IU/L (ref 40–129)
ALT SERPL-CCNC: 22 U/L (ref 10–40)
ANION GAP SERPL CALCULATED.3IONS-SCNC: 8 MMOL/L (ref 7–16)
AST SERPL-CCNC: 29 IU/L (ref 15–37)
BASOPHILS ABSOLUTE: 0 K/CU MM
BASOPHILS RELATIVE PERCENT: 0.5 % (ref 0–1)
BILIRUB SERPL-MCNC: 0.6 MG/DL (ref 0–1)
BILIRUBIN DIRECT: 0.2 MG/DL (ref 0–0.3)
BILIRUBIN, INDIRECT: 0.4 MG/DL (ref 0–0.7)
BILIRUBIN, URINE: NEGATIVE MG/DL
BLOOD, URINE: NEGATIVE
BUN SERPL-MCNC: 20 MG/DL (ref 6–23)
CALCIUM SERPL-MCNC: 9.3 MG/DL (ref 8.3–10.6)
CHLORIDE BLD-SCNC: 104 MMOL/L (ref 99–110)
CLARITY, UA: CLEAR
CO2: 25 MMOL/L (ref 21–32)
COLOR, UA: YELLOW
COMMENT UA: NORMAL
CREAT SERPL-MCNC: 1.2 MG/DL (ref 0.9–1.3)
DIFFERENTIAL TYPE: ABNORMAL
EOSINOPHILS ABSOLUTE: 0.2 K/CU MM
EOSINOPHILS RELATIVE PERCENT: 3.8 % (ref 0–3)
GFR, ESTIMATED: 61 ML/MIN/1.73M2
GLUCOSE SERPL-MCNC: 94 MG/DL (ref 70–99)
GLUCOSE URINE: NEGATIVE MG/DL
HCT VFR BLD CALC: 43.2 % (ref 42–52)
HEMOGLOBIN: 13.9 GM/DL (ref 13.5–18)
IMMATURE NEUTROPHIL %: 0.4 % (ref 0–0.43)
KETONES, URINE: NEGATIVE MG/DL
LEUKOCYTE ESTERASE, URINE: NEGATIVE
LYMPHOCYTES ABSOLUTE: 1.4 K/CU MM
LYMPHOCYTES RELATIVE PERCENT: 24.6 % (ref 24–44)
MCH RBC QN AUTO: 29.6 PG (ref 27–31)
MCHC RBC AUTO-ENTMCNC: 32.2 % (ref 32–36)
MCV RBC AUTO: 92.1 FL (ref 78–100)
MONOCYTES ABSOLUTE: 0.6 K/CU MM
MONOCYTES RELATIVE PERCENT: 11.1 % (ref 0–4)
NEUTROPHILS ABSOLUTE: 3.3 K/CU MM
NEUTROPHILS RELATIVE PERCENT: 59.6 % (ref 36–66)
NITRITE URINE, QUANTITATIVE: NEGATIVE
NUCLEATED RBC %: 0 %
PDW BLD-RTO: 13.2 % (ref 11.7–14.9)
PH, URINE: 6 (ref 5–8)
PLATELET # BLD: 167 K/CU MM (ref 140–440)
PMV BLD AUTO: 10.6 FL (ref 7.5–11.1)
POTASSIUM SERPL-SCNC: 5.1 MMOL/L (ref 3.5–5.1)
PROTEIN UA: NEGATIVE MG/DL
RBC # BLD: 4.69 M/CU MM (ref 4.6–6.2)
SODIUM BLD-SCNC: 137 MMOL/L (ref 135–145)
SPECIFIC GRAVITY UA: <1.005 (ref 1–1.03)
TOTAL IMMATURE NEUTOROPHIL: 0.02 K/CU MM
TOTAL NUCLEATED RBC: 0 K/CU MM
TOTAL PROTEIN: 6.9 GM/DL (ref 6.4–8.2)
TROPONIN, HIGH SENSITIVITY: 10 NG/L (ref 0–22)
TROPONIN, HIGH SENSITIVITY: 12 NG/L (ref 0–22)
UROBILINOGEN, URINE: 0.2 MG/DL (ref 0.2–1)
WBC # BLD: 5.5 K/CU MM (ref 4–10.5)

## 2024-07-20 PROCEDURE — 80053 COMPREHEN METABOLIC PANEL: CPT

## 2024-07-20 PROCEDURE — 93005 ELECTROCARDIOGRAM TRACING: CPT | Performed by: STUDENT IN AN ORGANIZED HEALTH CARE EDUCATION/TRAINING PROGRAM

## 2024-07-20 PROCEDURE — 81003 URINALYSIS AUTO W/O SCOPE: CPT

## 2024-07-20 PROCEDURE — 84484 ASSAY OF TROPONIN QUANT: CPT

## 2024-07-20 PROCEDURE — 82248 BILIRUBIN DIRECT: CPT

## 2024-07-20 PROCEDURE — 85025 COMPLETE CBC W/AUTO DIFF WBC: CPT

## 2024-07-20 PROCEDURE — 99284 EMERGENCY DEPT VISIT MOD MDM: CPT

## 2024-07-20 ASSESSMENT — PAIN - FUNCTIONAL ASSESSMENT: PAIN_FUNCTIONAL_ASSESSMENT: 0-10

## 2024-07-20 ASSESSMENT — PAIN SCALES - GENERAL: PAINLEVEL_OUTOF10: 0

## 2024-07-20 NOTE — ED TRIAGE NOTES
Patient to ED with complaints of HTN since 1100. States he is on meds for BP and has not missed any. Denies headache or vision changes.   
show

## 2024-07-21 LAB
EKG ATRIAL RATE: 60 BPM
EKG DIAGNOSIS: NORMAL
EKG P AXIS: 12 DEGREES
EKG P-R INTERVAL: 144 MS
EKG Q-T INTERVAL: 436 MS
EKG QRS DURATION: 112 MS
EKG QTC CALCULATION (BAZETT): 436 MS
EKG R AXIS: -32 DEGREES
EKG T AXIS: -27 DEGREES
EKG VENTRICULAR RATE: 60 BPM

## 2024-07-21 PROCEDURE — 93010 ELECTROCARDIOGRAM REPORT: CPT | Performed by: INTERNAL MEDICINE

## 2024-07-21 NOTE — ED PROVIDER NOTES
edema.   Skin:     General: Skin is warm.      Capillary Refill: Capillary refill takes less than 2 seconds.   Neurological:      Mental Status: He is alert and oriented to person, place, and time.      Cranial Nerves: Cranial nerves 2-12 are intact.      Sensory: Sensation is intact.      Motor: Motor function is intact.      Coordination: Coordination is intact.      Deep Tendon Reflexes: Reflexes are normal and symmetric.         I have reviewed and interpreted all of the currently available lab results from this visit (if applicable):  Results for orders placed or performed during the hospital encounter of 07/20/24   BMP   Result Value Ref Range    Sodium 137 135 - 145 MMOL/L    Potassium 5.1 3.5 - 5.1 MMOL/L    Chloride 104 99 - 110 mMol/L    CO2 25 21 - 32 MMOL/L    Anion Gap 8 7 - 16    Glucose 94 70 - 99 MG/DL    BUN 20 6 - 23 MG/DL    Creatinine 1.2 0.9 - 1.3 MG/DL    Est, Glom Filt Rate 61 >60 mL/min/1.73m2    Calcium 9.3 8.3 - 10.6 MG/DL   CBC with Auto Differential   Result Value Ref Range    WBC 5.5 4.0 - 10.5 K/CU MM    RBC 4.69 4.6 - 6.2 M/CU MM    Hemoglobin 13.9 13.5 - 18.0 GM/DL    Hematocrit 43.2 42 - 52 %    MCV 92.1 78 - 100 FL    MCH 29.6 27 - 31 PG    MCHC 32.2 32.0 - 36.0 %    RDW 13.2 11.7 - 14.9 %    Platelets 167 140 - 440 K/CU MM    MPV 10.6 7.5 - 11.1 FL    Differential Type AUTOMATED DIFFERENTIAL     Neutrophils % 59.6 36 - 66 %    Lymphocytes % 24.6 24 - 44 %    Monocytes % 11.1 (H) 0 - 4 %    Eosinophils % 3.8 (H) 0 - 3 %    Basophils % 0.5 0 - 1 %    Neutrophils Absolute 3.3 K/CU MM    Lymphocytes Absolute 1.4 K/CU MM    Monocytes Absolute 0.6 K/CU MM    Eosinophils Absolute 0.2 K/CU MM    Basophils Absolute 0.0 K/CU MM    Nucleated RBC % 0.0 %    Total Nucleated RBC 0.0 K/CU MM    Total Immature Neutrophil 0.02 K/CU MM    Immature Neutrophil % 0.4 0 - 0.43 %   Hepatic Function Panel   Result Value Ref Range    Albumin 3.9 3.4 - 5.0 GM/DL    Total Bilirubin 0.6 0.0 - 1.0 MG/DL

## 2024-07-21 NOTE — DISCHARGE INSTRUCTIONS
Today your blood work does not show any evidence for damage to any organs due to your high blood pressure.  All your lab work looks very reassuring.    Make a follow-up appointment with your primary care provider to discuss possible blood pressure medication changes.    If you have any symptoms of chest pain, headache, shortness of breath, abdominal pain return back to the emergency department.

## 2024-08-13 ENCOUNTER — HOSPITAL ENCOUNTER (OUTPATIENT)
Age: 84
Discharge: HOME | End: 2024-08-13
Payer: MEDICARE

## 2024-08-13 DIAGNOSIS — G47.10 HYPERSOMNOLENCE: ICD-10-CM

## 2024-08-13 LAB — TSH SERPL DL<=0.005 MIU/L-ACNC: 1.48 UIU/ML (ref 0.27–4.2)

## 2024-08-13 PROCEDURE — 84403 ASSAY OF TOTAL TESTOSTERONE: CPT

## 2024-08-13 PROCEDURE — 84270 ASSAY OF SEX HORMONE GLOBUL: CPT

## 2024-08-13 PROCEDURE — 36415 COLL VENOUS BLD VENIPUNCTURE: CPT

## 2024-08-13 PROCEDURE — 84443 ASSAY THYROID STIM HORMONE: CPT

## 2024-08-15 LAB
SHBG SERPL-SCNC: 45 NMOL/L (ref 19–76)
TESTOST FREE MFR SERPL: 1.5 % (ref 1.6–2.9)
TESTOST FREE SERPL-MCNC: 50 PG/ML (ref 47–244)
TESTOST SERPL-MCNC: 331 NG/DL (ref 300–720)

## 2024-10-22 ENCOUNTER — HOSPITAL ENCOUNTER (OUTPATIENT)
Age: 84
Discharge: HOME | End: 2024-10-22
Payer: MEDICARE

## 2024-10-22 DIAGNOSIS — N18.31 STAGE 3A CHRONIC KIDNEY DISEASE (HCC): ICD-10-CM

## 2024-10-22 DIAGNOSIS — I10 PRIMARY HYPERTENSION: ICD-10-CM

## 2024-10-22 DIAGNOSIS — F41.9 ANXIETY: ICD-10-CM

## 2024-10-22 DIAGNOSIS — Z98.890 S/P ABLATION OF ATRIAL FIBRILLATION: ICD-10-CM

## 2024-10-22 DIAGNOSIS — Z86.79 S/P ABLATION OF ATRIAL FIBRILLATION: ICD-10-CM

## 2024-10-22 DIAGNOSIS — E87.5 HYPERKALEMIA: ICD-10-CM

## 2024-10-22 LAB
ALBUMIN SERPL-MCNC: 4 G/DL (ref 3.4–5)
ANION GAP SERPL CALCULATED.3IONS-SCNC: 10 MMOL/L (ref 9–17)
BASOPHILS # BLD: 0.03 K/UL
BASOPHILS NFR BLD: 1 % (ref 0–1)
BILIRUB UR QL STRIP: NEGATIVE
BUN SERPL-MCNC: 21 MG/DL (ref 7–20)
CALCIUM SERPL-MCNC: 9 MG/DL (ref 8.3–10.6)
CHLORIDE SERPL-SCNC: 109 MMOL/L (ref 99–110)
CLARITY UR: CLEAR
CO2 SERPL-SCNC: 23 MMOL/L (ref 21–32)
COLOR UR: YELLOW
COMMENT: ABNORMAL
CREAT SERPL-MCNC: 1.3 MG/DL (ref 0.8–1.3)
EOSINOPHIL # BLD: 0.19 K/UL
EOSINOPHILS RELATIVE PERCENT: 3 % (ref 0–3)
ERYTHROCYTE [DISTWIDTH] IN BLOOD BY AUTOMATED COUNT: 13.8 % (ref 11.7–14.9)
GFR, ESTIMATED: 51 ML/MIN/1.73M2
GLUCOSE SERPL-MCNC: 106 MG/DL (ref 74–99)
GLUCOSE UR STRIP-MCNC: NEGATIVE MG/DL
HCT VFR BLD AUTO: 45.7 % (ref 42–52)
HGB BLD-MCNC: 14 G/DL (ref 13.5–18)
HGB UR QL STRIP.AUTO: NEGATIVE
IMM GRANULOCYTES # BLD AUTO: 0.01 K/UL
IMM GRANULOCYTES NFR BLD: 0 %
KETONES UR STRIP-MCNC: ABNORMAL MG/DL
LEUKOCYTE ESTERASE UR QL STRIP: NEGATIVE
LYMPHOCYTES NFR BLD: 1.19 K/UL
LYMPHOCYTES RELATIVE PERCENT: 20 % (ref 24–44)
MCH RBC QN AUTO: 30.4 PG (ref 27–31)
MCHC RBC AUTO-ENTMCNC: 30.6 G/DL (ref 32–36)
MCV RBC AUTO: 99.1 FL (ref 78–100)
MONOCYTES NFR BLD: 0.83 K/UL
MONOCYTES NFR BLD: 14 % (ref 0–4)
NEUTROPHILS NFR BLD: 62 % (ref 36–66)
NEUTS SEG NFR BLD: 3.64 K/UL
NITRITE UR QL STRIP: NEGATIVE
PH UR STRIP: 5.5 [PH] (ref 5–8)
PHOSPHATE SERPL-MCNC: 3.5 MG/DL (ref 2.5–4.9)
PLATELET # BLD AUTO: 159 K/UL (ref 140–440)
PMV BLD AUTO: 10.5 FL (ref 7.5–11.1)
POTASSIUM SERPL-SCNC: 5.6 MMOL/L (ref 3.5–5.1)
PROT UR STRIP-MCNC: NEGATIVE MG/DL
RBC # BLD AUTO: 4.61 M/UL (ref 4.6–6.2)
SODIUM SERPL-SCNC: 142 MMOL/L (ref 136–145)
SP GR UR STRIP: 1.02 (ref 1–1.03)
UROBILINOGEN UR STRIP-ACNC: 0.2 EU/DL (ref 0–1)
WBC OTHER # BLD: 5.9 K/UL (ref 4–10.5)

## 2024-10-22 PROCEDURE — 82088 ASSAY OF ALDOSTERONE: CPT

## 2024-10-22 PROCEDURE — 80048 BASIC METABOLIC PNL TOTAL CA: CPT

## 2024-10-22 PROCEDURE — 84156 ASSAY OF PROTEIN URINE: CPT

## 2024-10-22 PROCEDURE — 82040 ASSAY OF SERUM ALBUMIN: CPT

## 2024-10-22 PROCEDURE — 85025 COMPLETE CBC W/AUTO DIFF WBC: CPT

## 2024-10-22 PROCEDURE — 81003 URINALYSIS AUTO W/O SCOPE: CPT

## 2024-10-22 PROCEDURE — 84244 ASSAY OF RENIN: CPT

## 2024-10-22 PROCEDURE — 84100 ASSAY OF PHOSPHORUS: CPT

## 2024-10-22 PROCEDURE — 36415 COLL VENOUS BLD VENIPUNCTURE: CPT

## 2024-10-22 PROCEDURE — 82570 ASSAY OF URINE CREATININE: CPT

## 2024-10-24 LAB
CREAT UR-MCNC: 148 MG/DL (ref 39–259)
TOTAL PROTEIN, URINE: 19 MG/DL
URINE TOTAL PROTEIN CREATININE RATIO: 0.13 (ref 0–0.2)

## 2024-10-31 ENCOUNTER — HOSPITAL ENCOUNTER (OUTPATIENT)
Age: 84
Setting detail: SPECIMEN
Discharge: HOME | End: 2024-10-31
Payer: MEDICARE

## 2024-10-31 PROCEDURE — 82088 ASSAY OF ALDOSTERONE: CPT

## 2024-10-31 PROCEDURE — 84244 ASSAY OF RENIN: CPT

## 2024-10-31 PROCEDURE — 36415 COLL VENOUS BLD VENIPUNCTURE: CPT

## 2024-11-04 LAB
ALDOST SERPL-MCNC: 10.9 NG/DL
ALDOST/RENIN PLAS-RTO: 18.2 RATIO
RENIN PLAS-CCNC: 0.6 NG/ML/HR

## 2024-11-21 ENCOUNTER — HOSPITAL ENCOUNTER (OUTPATIENT)
Dept: CT IMAGING | Age: 84
Discharge: HOME | End: 2024-11-21
Payer: MEDICARE

## 2024-11-21 DIAGNOSIS — R10.84 GENERALIZED ABDOMINAL PAIN: ICD-10-CM

## 2024-11-21 PROCEDURE — 74177 CT ABD & PELVIS W/CONTRAST: CPT

## 2024-11-21 PROCEDURE — 6360000004 HC RX CONTRAST MEDICATION: Performed by: PHYSICIAN ASSISTANT

## 2024-11-21 RX ORDER — IOPAMIDOL 755 MG/ML
120 INJECTION, SOLUTION INTRAVASCULAR
Status: COMPLETED | OUTPATIENT
Start: 2024-11-21 | End: 2024-11-21

## 2024-11-21 RX ADMIN — IOPAMIDOL 120 ML: 755 INJECTION, SOLUTION INTRAVENOUS at 15:18

## 2025-01-03 ENCOUNTER — HOSPITAL ENCOUNTER (OUTPATIENT)
Age: 85
Discharge: HOME | End: 2025-01-03
Payer: MEDICARE

## 2025-01-03 DIAGNOSIS — I48.91 ATRIAL FIBRILLATION, UNSPECIFIED TYPE (HCC): ICD-10-CM

## 2025-01-03 DIAGNOSIS — I10 PRIMARY HYPERTENSION: ICD-10-CM

## 2025-01-03 DIAGNOSIS — E87.5 HYPERKALEMIA: ICD-10-CM

## 2025-01-03 DIAGNOSIS — F41.9 ANXIETY: ICD-10-CM

## 2025-01-03 DIAGNOSIS — G47.33 OSA ON CPAP: ICD-10-CM

## 2025-01-03 DIAGNOSIS — N18.31 STAGE 3A CHRONIC KIDNEY DISEASE (HCC): ICD-10-CM

## 2025-01-03 LAB
ALBUMIN SERPL-MCNC: 3.9 G/DL (ref 3.4–5)
ANION GAP SERPL CALCULATED.3IONS-SCNC: 9 MMOL/L (ref 9–17)
BASOPHILS # BLD: 0.03 K/UL
BASOPHILS NFR BLD: 1 % (ref 0–1)
BILIRUB UR QL STRIP: NEGATIVE
BUN SERPL-MCNC: 21 MG/DL (ref 7–20)
CALCIUM SERPL-MCNC: 9 MG/DL (ref 8.3–10.6)
CHLORIDE SERPL-SCNC: 108 MMOL/L (ref 99–110)
CLARITY UR: CLEAR
CO2 SERPL-SCNC: 24 MMOL/L (ref 21–32)
COLOR UR: YELLOW
COMMENT: NORMAL
CREAT SERPL-MCNC: 1.3 MG/DL (ref 0.8–1.3)
CREAT UR-MCNC: 55.2 MG/DL (ref 39–259)
EOSINOPHIL # BLD: 0.24 K/UL
EOSINOPHILS RELATIVE PERCENT: 4 % (ref 0–3)
ERYTHROCYTE [DISTWIDTH] IN BLOOD BY AUTOMATED COUNT: 12.7 % (ref 11.7–14.9)
GFR, ESTIMATED: 53 ML/MIN/1.73M2
GLUCOSE SERPL-MCNC: 81 MG/DL (ref 74–99)
GLUCOSE UR STRIP-MCNC: NEGATIVE MG/DL
HCT VFR BLD AUTO: 49 % (ref 42–52)
HGB BLD-MCNC: 15.1 G/DL (ref 13.5–18)
HGB UR QL STRIP.AUTO: NEGATIVE
IMM GRANULOCYTES # BLD AUTO: 0.01 K/UL
IMM GRANULOCYTES NFR BLD: 0 %
KETONES UR STRIP-MCNC: NEGATIVE MG/DL
LEUKOCYTE ESTERASE UR QL STRIP: NEGATIVE
LYMPHOCYTES NFR BLD: 1.49 K/UL
LYMPHOCYTES RELATIVE PERCENT: 24 % (ref 24–44)
MCH RBC QN AUTO: 29 PG (ref 27–31)
MCHC RBC AUTO-ENTMCNC: 30.8 G/DL (ref 32–36)
MCV RBC AUTO: 94.2 FL (ref 78–100)
MONOCYTES NFR BLD: 0.89 K/UL
MONOCYTES NFR BLD: 14 % (ref 0–4)
NEUTROPHILS NFR BLD: 58 % (ref 36–66)
NEUTS SEG NFR BLD: 3.62 K/UL
NITRITE UR QL STRIP: NEGATIVE
PH UR STRIP: 6 [PH] (ref 5–8)
PHOSPHATE SERPL-MCNC: 3.5 MG/DL (ref 2.5–4.9)
PLATELET # BLD AUTO: 151 K/UL (ref 140–440)
PMV BLD AUTO: 10.8 FL (ref 7.5–11.1)
POTASSIUM SERPL-SCNC: 4.6 MMOL/L (ref 3.5–5.1)
PROT UR STRIP-MCNC: NEGATIVE MG/DL
RBC # BLD AUTO: 5.2 M/UL (ref 4.6–6.2)
SODIUM SERPL-SCNC: 141 MMOL/L (ref 136–145)
SP GR UR STRIP: 1.02 (ref 1–1.03)
TOTAL PROTEIN, URINE: 21 MG/DL
URINE TOTAL PROTEIN CREATININE RATIO: 0.39 (ref 0–0.2)
UROBILINOGEN UR STRIP-ACNC: 0.2 EU/DL (ref 0–1)
WBC OTHER # BLD: 6.3 K/UL (ref 4–10.5)

## 2025-01-03 PROCEDURE — 82570 ASSAY OF URINE CREATININE: CPT

## 2025-01-03 PROCEDURE — 84156 ASSAY OF PROTEIN URINE: CPT

## 2025-01-03 PROCEDURE — 81003 URINALYSIS AUTO W/O SCOPE: CPT

## 2025-01-03 PROCEDURE — 80048 BASIC METABOLIC PNL TOTAL CA: CPT

## 2025-01-03 PROCEDURE — 84100 ASSAY OF PHOSPHORUS: CPT

## 2025-01-03 PROCEDURE — 85025 COMPLETE CBC W/AUTO DIFF WBC: CPT

## 2025-01-03 PROCEDURE — 82040 ASSAY OF SERUM ALBUMIN: CPT

## 2025-02-13 ENCOUNTER — HOSPITAL ENCOUNTER (OUTPATIENT)
Age: 85
Discharge: HOME | End: 2025-02-13
Payer: MEDICARE

## 2025-02-13 LAB
ALBUMIN SERPL-MCNC: 3.8 G/DL (ref 3.4–5)
ALBUMIN/GLOB SERPL: 1.2 {RATIO} (ref 1.1–2.2)
ALP SERPL-CCNC: 69 U/L (ref 40–129)
ALT SERPL-CCNC: 18 U/L (ref 10–40)
ANION GAP SERPL CALCULATED.3IONS-SCNC: 11 MMOL/L (ref 9–17)
AST SERPL-CCNC: 30 U/L (ref 15–37)
BASOPHILS # BLD: 0.02 K/UL
BASOPHILS NFR BLD: 0 % (ref 0–1)
BILIRUB SERPL-MCNC: 0.5 MG/DL (ref 0–1)
BUN SERPL-MCNC: 19 MG/DL (ref 7–20)
CALCIUM SERPL-MCNC: 9.7 MG/DL (ref 8.3–10.6)
CHLORIDE SERPL-SCNC: 107 MMOL/L (ref 99–110)
CHOLEST SERPL-MCNC: 116 MG/DL (ref 125–199)
CO2 SERPL-SCNC: 26 MMOL/L (ref 21–32)
CREAT SERPL-MCNC: 1.5 MG/DL (ref 0.8–1.3)
EOSINOPHIL # BLD: 0.23 K/UL
EOSINOPHILS RELATIVE PERCENT: 4 % (ref 0–3)
ERYTHROCYTE [DISTWIDTH] IN BLOOD BY AUTOMATED COUNT: 14.5 % (ref 11.7–14.9)
GFR, ESTIMATED: 44 ML/MIN/1.73M2
GLUCOSE SERPL-MCNC: 63 MG/DL (ref 74–99)
HCT VFR BLD AUTO: 48.6 % (ref 42–52)
HDLC SERPL-MCNC: 53 MG/DL
HGB BLD-MCNC: 15.1 G/DL (ref 13.5–18)
IMM GRANULOCYTES # BLD AUTO: 0.01 K/UL
IMM GRANULOCYTES NFR BLD: 0 %
LDLC SERPL CALC-MCNC: 36 MG/DL
LYMPHOCYTES NFR BLD: 1.28 K/UL
LYMPHOCYTES RELATIVE PERCENT: 20 % (ref 24–44)
MCH RBC QN AUTO: 29.1 PG (ref 27–31)
MCHC RBC AUTO-ENTMCNC: 31.1 G/DL (ref 32–36)
MCV RBC AUTO: 93.6 FL (ref 78–100)
MONOCYTES NFR BLD: 0.83 K/UL
MONOCYTES NFR BLD: 13 % (ref 0–4)
NEUTROPHILS NFR BLD: 62 % (ref 36–66)
NEUTS SEG NFR BLD: 3.9 K/UL
PLATELET # BLD AUTO: 163 K/UL (ref 140–440)
PMV BLD AUTO: 10.7 FL (ref 7.5–11.1)
POTASSIUM SERPL-SCNC: 4.3 MMOL/L (ref 3.5–5.1)
PROT SERPL-MCNC: 6.9 G/DL (ref 6.4–8.2)
PSA SERPL-MCNC: 2.5 NG/ML (ref 0–4)
RBC # BLD AUTO: 5.19 M/UL (ref 4.6–6.2)
SODIUM SERPL-SCNC: 144 MMOL/L (ref 136–145)
TRIGL SERPL-MCNC: 139 MG/DL
WBC OTHER # BLD: 6.3 K/UL (ref 4–10.5)

## 2025-02-13 PROCEDURE — 84402 ASSAY OF FREE TESTOSTERONE: CPT

## 2025-02-13 PROCEDURE — 84403 ASSAY OF TOTAL TESTOSTERONE: CPT

## 2025-02-13 PROCEDURE — 84270 ASSAY OF SEX HORMONE GLOBUL: CPT

## 2025-02-13 PROCEDURE — 80053 COMPREHEN METABOLIC PANEL: CPT

## 2025-02-13 PROCEDURE — 85025 COMPLETE CBC W/AUTO DIFF WBC: CPT

## 2025-02-13 PROCEDURE — 80061 LIPID PANEL: CPT

## 2025-02-13 PROCEDURE — G0103 PSA SCREENING: HCPCS

## 2025-02-16 LAB
SEX HORMONE BINDING GLOBULIN: 39 NMOL/L (ref 19–76)
TESTOSTERONE FREE PERCENT: 1.7 % (ref 1.6–2.9)
TESTOSTERONE FREE-MALE: 80 PG/ML (ref 47–244)
TESTOSTERONE TOTAL-MALE: 467 NG/DL (ref 300–720)